# Patient Record
Sex: FEMALE | Race: ASIAN | Employment: OTHER | URBAN - METROPOLITAN AREA
[De-identification: names, ages, dates, MRNs, and addresses within clinical notes are randomized per-mention and may not be internally consistent; named-entity substitution may affect disease eponyms.]

---

## 2017-01-12 ENCOUNTER — OFFICE VISIT (OUTPATIENT)
Dept: INTERNAL MEDICINE CLINIC | Facility: CLINIC | Age: 67
End: 2017-01-12

## 2017-01-12 VITALS
OXYGEN SATURATION: 96 % | HEART RATE: 72 BPM | TEMPERATURE: 99 F | DIASTOLIC BLOOD PRESSURE: 52 MMHG | SYSTOLIC BLOOD PRESSURE: 114 MMHG | HEIGHT: 62 IN | WEIGHT: 179 LBS | BODY MASS INDEX: 32.94 KG/M2

## 2017-01-12 DIAGNOSIS — J06.9 URI, ACUTE: Primary | ICD-10-CM

## 2017-01-12 DIAGNOSIS — I10 ESSENTIAL HYPERTENSION: ICD-10-CM

## 2017-01-12 DIAGNOSIS — H26.8 OTHER CATARACT: ICD-10-CM

## 2017-01-12 PROCEDURE — 99213 OFFICE O/P EST LOW 20 MIN: CPT | Performed by: INTERNAL MEDICINE

## 2017-01-12 RX ORDER — APIXABAN 2.5 MG/1
1 TABLET, FILM COATED ORAL 2 TIMES DAILY
Refills: 1 | COMMUNITY
Start: 2016-12-06 | End: 2017-10-13 | Stop reason: ALTCHOICE

## 2017-01-12 RX ORDER — LATANOPROST 50 UG/ML
1 SOLUTION/ DROPS OPHTHALMIC NIGHTLY
Qty: 2.5 ML | Refills: 4 | Status: SHIPPED | OUTPATIENT
Start: 2017-01-12 | End: 2017-11-29

## 2017-01-12 RX ORDER — CODEINE PHOSPHATE AND GUAIFENESIN 10; 100 MG/5ML; MG/5ML
10 SOLUTION ORAL EVERY 6 HOURS PRN
Qty: 180 ML | Refills: 0 | Status: SHIPPED | OUTPATIENT
Start: 2017-01-12 | End: 2017-01-22

## 2017-01-12 RX ORDER — FUROSEMIDE 20 MG/1
40 TABLET ORAL DAILY
Qty: 90 TABLET | Refills: 1 | Status: SHIPPED | OUTPATIENT
Start: 2017-01-12 | End: 2019-05-30

## 2017-01-12 RX ORDER — FAMOTIDINE 20 MG/1
1 TABLET ORAL 2 TIMES DAILY
Refills: 2 | COMMUNITY
Start: 2016-12-24 | End: 2018-01-12

## 2017-01-12 RX ORDER — AZITHROMYCIN 250 MG/1
TABLET, FILM COATED ORAL
Qty: 6 TABLET | Refills: 0 | Status: SHIPPED | OUTPATIENT
Start: 2017-01-12 | End: 2017-10-13 | Stop reason: ALTCHOICE

## 2017-01-12 RX ORDER — CARVEDILOL 6.25 MG/1
6.25 TABLET ORAL 2 TIMES DAILY WITH MEALS
Qty: 180 TABLET | Refills: 1 | Status: SHIPPED | OUTPATIENT
Start: 2017-01-12 | End: 2017-06-19

## 2017-01-12 NOTE — PROGRESS NOTES
Mary Todd is a 78year old female. Patient presents with:  Cough: w/ phlegm, body aches, headache, and chest congestion x 2 days.        HPI:     Patient c/o cough, congestion, b/l ear pain, headache for the last 4 days, worse in the last 2 day sulfate 325 (65 FE) MG Oral Tab EC Take 325 mg by mouth 2 (two) times daily with meals. Disp:  Rfl:    levetiracetam 750 MG Oral Tab Take 1 tablet by mouth 2 (two) times daily.  Disp:  Rfl:       Past Medical History   Diagnosis Date   • Pure hypercholest Take 2 tablets (40 mg total) by mouth daily. carvedilol 6.25 MG Oral Tab 180 tablet 1      Sig: Take 1 tablet (6.25 mg total) by mouth 2 (two) times daily with meals.       guaiFENesin-codeine (CHERATUSSIN AC) 100-10 MG/5ML Oral Solution 180 mL 0

## 2017-01-19 ENCOUNTER — OFFICE VISIT (OUTPATIENT)
Dept: INTERNAL MEDICINE CLINIC | Facility: CLINIC | Age: 67
End: 2017-01-19

## 2017-01-19 VITALS
RESPIRATION RATE: 16 BRPM | DIASTOLIC BLOOD PRESSURE: 70 MMHG | SYSTOLIC BLOOD PRESSURE: 124 MMHG | OXYGEN SATURATION: 98 % | HEART RATE: 64 BPM | HEIGHT: 62 IN | TEMPERATURE: 98 F | WEIGHT: 178 LBS | BODY MASS INDEX: 32.76 KG/M2

## 2017-01-19 DIAGNOSIS — R06.2 WHEEZING: ICD-10-CM

## 2017-01-19 DIAGNOSIS — R05.3 PERSISTENT COUGH: Primary | ICD-10-CM

## 2017-01-19 PROCEDURE — 99213 OFFICE O/P EST LOW 20 MIN: CPT | Performed by: INTERNAL MEDICINE

## 2017-01-19 RX ORDER — PREDNISONE 10 MG/1
10 TABLET ORAL DAILY
Qty: 18 TABLET | Refills: 0 | Status: SHIPPED | OUTPATIENT
Start: 2017-01-19 | End: 2018-01-12

## 2017-01-19 RX ORDER — ALBUTEROL SULFATE 90 UG/1
2 AEROSOL, METERED RESPIRATORY (INHALATION) EVERY 4 HOURS PRN
Qty: 1 INHALER | Refills: 1 | Status: SHIPPED | OUTPATIENT
Start: 2017-01-19 | End: 2018-01-12

## 2017-01-19 RX ORDER — CODEINE PHOSPHATE AND GUAIFENESIN 10; 100 MG/5ML; MG/5ML
10 SOLUTION ORAL NIGHTLY PRN
Qty: 236 ML | Refills: 0 | Status: SHIPPED | OUTPATIENT
Start: 2017-01-19 | End: 2017-01-29

## 2017-01-19 NOTE — PROGRESS NOTES
Stephen Husbands is a 78year old female. Patient presents with: Follow - Up: still has congestion and cough.  Also still has Phlegm, some possible wheezing       HPI:     Patient c/o persistent cough, chest wall pain from cough and now upper abdomin Rfl: 1   guaiFENesin-codeine (CHERATUSSIN AC) 100-10 MG/5ML Oral Solution Take 10 mL by mouth every 6 (six) hours as needed for cough.  Disp: 180 mL Rfl: 0   azithromycin (ZITHROMAX Z-AUSTEN) 250 MG Oral Tab 2 tabs po the first day and then one daily until fin taper, albuterol prn. Refilled Cheratussin AC.  If not improving, check cxr  Wheezing- as above      Meds & Refills for this Visit:  Signed Prescriptions Disp Refills    predniSONE 10 MG Oral Tab 18 tablet 0      Sig: Take 1 tablet (10 mg total) by mouth da

## 2017-05-17 ENCOUNTER — MED REC SCAN ONLY (OUTPATIENT)
Dept: INTERNAL MEDICINE CLINIC | Facility: CLINIC | Age: 67
End: 2017-05-17

## 2017-06-19 RX ORDER — CARVEDILOL 6.25 MG/1
TABLET ORAL
Qty: 180 TABLET | Refills: 0 | Status: SHIPPED | OUTPATIENT
Start: 2017-06-19 | End: 2017-09-17

## 2017-07-26 ENCOUNTER — PATIENT MESSAGE (OUTPATIENT)
Dept: INTERNAL MEDICINE CLINIC | Facility: CLINIC | Age: 67
End: 2017-07-26

## 2017-07-26 NOTE — TELEPHONE ENCOUNTER
From: Katherin Howard  To: Estrella Rodríguez MD  Sent: 7/26/2017 2:07 PM CDT  Subject: Non-Urgent Miriam Rios,  My name is Glenn. My question is : when is the next time I need to take TDAP.  If my memory is correct I took one i

## 2017-07-26 NOTE — TELEPHONE ENCOUNTER
LOV 1/19/17 TB    Last Tdap 10/4/12  Last CPE 10/4/16  Last labs 10/21/16  Pt is not due for f/u, immunizations, CPE or labs at this time.  Please advise/comfirm

## 2017-08-02 RX ORDER — LEVETIRACETAM 750 MG/1
750 TABLET ORAL 2 TIMES DAILY
Qty: 180 TABLET | Refills: 3 | Status: SHIPPED | OUTPATIENT
Start: 2017-08-02 | End: 2017-10-13

## 2017-08-02 NOTE — TELEPHONE ENCOUNTER
LOV : 1.19.17   Upcoming visit 10.12.17   Last labs: 10.21.16 bmp   Last refill : 5/17/16   Per protocol  Route to provider

## 2017-08-16 DIAGNOSIS — E78.00 PURE HYPERCHOLESTEROLEMIA: ICD-10-CM

## 2017-08-16 RX ORDER — ATORVASTATIN CALCIUM 10 MG/1
TABLET, FILM COATED ORAL
Qty: 90 TABLET | Refills: 0 | Status: SHIPPED | OUTPATIENT
Start: 2017-08-16 | End: 2017-11-13

## 2017-09-18 RX ORDER — CARVEDILOL 6.25 MG/1
TABLET ORAL
Qty: 180 TABLET | Refills: 0 | Status: SHIPPED | OUTPATIENT
Start: 2017-09-18 | End: 2017-12-15

## 2017-10-11 ENCOUNTER — TELEPHONE (OUTPATIENT)
Dept: INTERNAL MEDICINE CLINIC | Facility: CLINIC | Age: 67
End: 2017-10-11

## 2017-10-11 DIAGNOSIS — E78.00 PURE HYPERCHOLESTEROLEMIA: ICD-10-CM

## 2017-10-11 DIAGNOSIS — I10 ESSENTIAL HYPERTENSION: Primary | ICD-10-CM

## 2017-10-11 DIAGNOSIS — G40.909 SEIZURE DISORDER (HCC): ICD-10-CM

## 2017-10-11 NOTE — TELEPHONE ENCOUNTER
Last labs TSH , cbc, cmp, lipid on 9/16/16  LOV 1/19/17  Orders pended for approval - please advise what/if any additional labs needed.

## 2017-10-13 ENCOUNTER — LAB ENCOUNTER (OUTPATIENT)
Dept: LAB | Age: 67
End: 2017-10-13
Attending: INTERNAL MEDICINE
Payer: MEDICARE

## 2017-10-13 ENCOUNTER — OFFICE VISIT (OUTPATIENT)
Dept: INTERNAL MEDICINE CLINIC | Facility: CLINIC | Age: 67
End: 2017-10-13

## 2017-10-13 VITALS
HEIGHT: 62 IN | TEMPERATURE: 98 F | WEIGHT: 178.5 LBS | RESPIRATION RATE: 16 BRPM | OXYGEN SATURATION: 98 % | DIASTOLIC BLOOD PRESSURE: 74 MMHG | BODY MASS INDEX: 32.85 KG/M2 | HEART RATE: 59 BPM | SYSTOLIC BLOOD PRESSURE: 130 MMHG

## 2017-10-13 DIAGNOSIS — Z23 NEED FOR INFLUENZA VACCINATION: ICD-10-CM

## 2017-10-13 DIAGNOSIS — I10 ESSENTIAL HYPERTENSION: ICD-10-CM

## 2017-10-13 DIAGNOSIS — E78.00 PURE HYPERCHOLESTEROLEMIA: ICD-10-CM

## 2017-10-13 DIAGNOSIS — Z98.890 STATUS POST RESECTION OF MENINGIOMA: ICD-10-CM

## 2017-10-13 DIAGNOSIS — G40.909 SEIZURE DISORDER (HCC): ICD-10-CM

## 2017-10-13 DIAGNOSIS — Z86.018 STATUS POST RESECTION OF MENINGIOMA: ICD-10-CM

## 2017-10-13 DIAGNOSIS — Z00.00 WELLNESS EXAMINATION: Primary | ICD-10-CM

## 2017-10-13 PROCEDURE — 80061 LIPID PANEL: CPT

## 2017-10-13 PROCEDURE — 80053 COMPREHEN METABOLIC PANEL: CPT

## 2017-10-13 PROCEDURE — 85025 COMPLETE CBC W/AUTO DIFF WBC: CPT

## 2017-10-13 PROCEDURE — 90653 IIV ADJUVANT VACCINE IM: CPT | Performed by: INTERNAL MEDICINE

## 2017-10-13 PROCEDURE — G0439 PPPS, SUBSEQ VISIT: HCPCS | Performed by: INTERNAL MEDICINE

## 2017-10-13 PROCEDURE — 84443 ASSAY THYROID STIM HORMONE: CPT

## 2017-10-13 PROCEDURE — G0008 ADMIN INFLUENZA VIRUS VAC: HCPCS | Performed by: INTERNAL MEDICINE

## 2017-11-13 DIAGNOSIS — E78.00 PURE HYPERCHOLESTEROLEMIA: ICD-10-CM

## 2017-11-13 RX ORDER — ATORVASTATIN CALCIUM 10 MG/1
TABLET, FILM COATED ORAL
Qty: 90 TABLET | Refills: 1 | Status: SHIPPED | OUTPATIENT
Start: 2017-11-13 | End: 2018-05-10

## 2017-11-29 RX ORDER — LATANOPROST 50 UG/ML
SOLUTION/ DROPS OPHTHALMIC
Qty: 2.5 ML | Refills: 0 | Status: SHIPPED | OUTPATIENT
Start: 2017-11-29 | End: 2017-12-19

## 2017-12-18 RX ORDER — CARVEDILOL 6.25 MG/1
6.25 TABLET ORAL 2 TIMES DAILY WITH MEALS
Qty: 180 TABLET | Refills: 1 | Status: SHIPPED | OUTPATIENT
Start: 2017-12-18 | End: 2018-06-13

## 2017-12-19 RX ORDER — LATANOPROST 50 UG/ML
SOLUTION/ DROPS OPHTHALMIC
Qty: 2.5 ML | Refills: 0 | Status: SHIPPED | OUTPATIENT
Start: 2017-12-19 | End: 2018-01-07

## 2017-12-19 NOTE — TELEPHONE ENCOUNTER
Lov: 10/13/2017   Upcoming Appt: None   Last Labs: 10/13/2017 Lipid, CMP, CBC, Tsh   Last Refill: 11/29/2017 . 0 refills       Per protocol route to provider

## 2018-01-08 RX ORDER — LATANOPROST 50 UG/ML
SOLUTION/ DROPS OPHTHALMIC
Qty: 2.5 ML | Refills: 0 | Status: SHIPPED | OUTPATIENT
Start: 2018-01-08 | End: 2018-01-31

## 2018-01-09 NOTE — TELEPHONE ENCOUNTER
LOV: 10/13/17  Future office visit: No upcoming visit  Last labs: 10/13/17 Lipid Cmp Cbc Tsh   Last RX: 12/19/17 #2.5mL No Refills  Per protocol: Route to provider

## 2018-01-12 ENCOUNTER — OFFICE VISIT (OUTPATIENT)
Dept: INTERNAL MEDICINE CLINIC | Facility: CLINIC | Age: 68
End: 2018-01-12

## 2018-01-12 VITALS
DIASTOLIC BLOOD PRESSURE: 78 MMHG | HEART RATE: 60 BPM | HEIGHT: 62 IN | RESPIRATION RATE: 16 BRPM | BODY MASS INDEX: 33.53 KG/M2 | SYSTOLIC BLOOD PRESSURE: 130 MMHG | TEMPERATURE: 98 F | WEIGHT: 182.19 LBS

## 2018-01-12 DIAGNOSIS — J06.9 URI, ACUTE: Primary | ICD-10-CM

## 2018-01-12 PROCEDURE — 99213 OFFICE O/P EST LOW 20 MIN: CPT | Performed by: INTERNAL MEDICINE

## 2018-01-12 RX ORDER — AZITHROMYCIN 250 MG/1
TABLET, FILM COATED ORAL
Qty: 6 TABLET | Refills: 0 | Status: SHIPPED | OUTPATIENT
Start: 2018-01-12 | End: 2018-04-09 | Stop reason: ALTCHOICE

## 2018-01-12 NOTE — PROGRESS NOTES
Radha Castillo is a 75year old female. Patient presents with:  Sore Throat: Room 3. Cough, headache, nasal drip. Started yesterday       HPI:     Patient c/o sore throat, cough and congestion since yesterday. No fevers or myalgias.  Had flu vaccine Breast Cancer Mother 46   • brain cancer [OTHER] Mother         Allergies    Benadryl [Diphenhyd*        Comment:Rapid heart rate  Heparin                     Comment:Blood clot  Penicillins             Hives      REVIEW OF SYSTEMS:   GENERAL HEALTH:  no f

## 2018-01-31 RX ORDER — LATANOPROST 50 UG/ML
SOLUTION/ DROPS OPHTHALMIC
Qty: 2.5 ML | Refills: 0 | Status: SHIPPED | OUTPATIENT
Start: 2018-01-31 | End: 2018-03-15

## 2018-01-31 NOTE — TELEPHONE ENCOUNTER
LOV: 1/12/18  Future office visit: No upcoming visit   Last labs: 10/13/17 Lipid Cmp Cbc Tsh  Last RX: 1/8/18 2/5 mL no Refills  Per protocol: Route to provider

## 2018-03-15 RX ORDER — LATANOPROST 50 UG/ML
SOLUTION/ DROPS OPHTHALMIC
Qty: 7.5 ML | Refills: 0 | Status: SHIPPED | OUTPATIENT
Start: 2018-03-15 | End: 2018-05-29

## 2018-03-15 NOTE — TELEPHONE ENCOUNTER
LOV: 1/12/18 w/ TB  FOV: 4/9/18  Last labs: 10/13/17 LIPID,CMP,CBC,TSH  Last Refill: 1/31/18 qt:2.5 mL    Per protocol sent to provider

## 2018-04-09 ENCOUNTER — OFFICE VISIT (OUTPATIENT)
Dept: INTERNAL MEDICINE CLINIC | Facility: CLINIC | Age: 68
End: 2018-04-09

## 2018-04-09 VITALS
HEIGHT: 62 IN | WEIGHT: 182 LBS | DIASTOLIC BLOOD PRESSURE: 64 MMHG | RESPIRATION RATE: 16 BRPM | BODY MASS INDEX: 33.49 KG/M2 | HEART RATE: 72 BPM | SYSTOLIC BLOOD PRESSURE: 118 MMHG | TEMPERATURE: 98 F

## 2018-04-09 DIAGNOSIS — Z01.818 PRE-OP EXAMINATION: Primary | ICD-10-CM

## 2018-04-09 DIAGNOSIS — F41.8 SITUATIONAL ANXIETY: ICD-10-CM

## 2018-04-09 DIAGNOSIS — H26.8 OTHER CATARACT OF LEFT EYE: ICD-10-CM

## 2018-04-09 DIAGNOSIS — M54.31 RIGHT SIDED SCIATICA: ICD-10-CM

## 2018-04-09 PROCEDURE — 99214 OFFICE O/P EST MOD 30 MIN: CPT | Performed by: INTERNAL MEDICINE

## 2018-04-09 RX ORDER — PREDNISOLONE ACETATE 10 MG/ML
SUSPENSION/ DROPS OPHTHALMIC
COMMUNITY
Start: 2018-03-29 | End: 2018-08-10

## 2018-04-09 RX ORDER — OFLOXACIN 3 MG/ML
SOLUTION/ DROPS OPHTHALMIC
COMMUNITY
Start: 2018-03-29 | End: 2018-08-10

## 2018-04-09 RX ORDER — METHYLPREDNISOLONE 4 MG/1
TABLET ORAL
Qty: 1 KIT | Refills: 0 | Status: SHIPPED | OUTPATIENT
Start: 2018-04-09 | End: 2018-08-10

## 2018-04-09 RX ORDER — KETOROLAC TROMETHAMINE 5 MG/ML
SOLUTION OPHTHALMIC
COMMUNITY
Start: 2018-03-29 | End: 2020-11-13

## 2018-04-09 RX ORDER — ALPRAZOLAM 0.5 MG/1
0.5 TABLET ORAL DAILY PRN
Qty: 10 TABLET | Refills: 0 | Status: SHIPPED | OUTPATIENT
Start: 2018-04-09 | End: 2018-08-10

## 2018-04-09 NOTE — PROGRESS NOTES
Cassidy Crain is a 70year old female.   Patient presents with:  Pre-Op Exam: AB RM 3, Cataract surgery on 4/18/2018 with Dr. Lynnette Aragon  Back Pain: Pt states having right back pain that travels down the right leg X 2 weeks       HPI:     Patient with Ophthalmic Solution INSTILL 1 DROP IN BOTH EYES EVERY NIGHT Disp: 7.5 mL Rfl: 0   carvedilol 6.25 MG Oral Tab Take 1 tablet (6.25 mg total) by mouth 2 (two) times daily with meals.  Disp: 180 tablet Rfl: 1   ATORVASTATIN 10 MG Oral Tab TAKE 1 TABLET(10 MG) cyanosis, clubbing or edema, normal strength and tone  Spine: lumbar spine tenderness, +radiation to right buttock  NEURO: Alert and oriented, no focal deficits    ASSESSMENT AND PLAN:   Pre-op examination - Patient medically cleared for cataract surgery w

## 2018-04-16 ENCOUNTER — TELEPHONE (OUTPATIENT)
Dept: INTERNAL MEDICINE CLINIC | Facility: CLINIC | Age: 68
End: 2018-04-16

## 2018-05-08 ENCOUNTER — MED REC SCAN ONLY (OUTPATIENT)
Dept: INTERNAL MEDICINE CLINIC | Facility: CLINIC | Age: 68
End: 2018-05-08

## 2018-05-10 DIAGNOSIS — E78.00 PURE HYPERCHOLESTEROLEMIA: ICD-10-CM

## 2018-05-10 RX ORDER — ATORVASTATIN CALCIUM 10 MG/1
TABLET, FILM COATED ORAL
Qty: 90 TABLET | Refills: 0 | Status: SHIPPED | OUTPATIENT
Start: 2018-05-10 | End: 2018-08-08

## 2018-05-29 RX ORDER — LATANOPROST 50 UG/ML
SOLUTION/ DROPS OPHTHALMIC
Qty: 7.5 ML | Refills: 0 | Status: SHIPPED | OUTPATIENT
Start: 2018-05-29 | End: 2018-08-15

## 2018-05-29 NOTE — TELEPHONE ENCOUNTER
Last Office Visit: 4-9-18 with TB for pre op   Last Rx Filled: 3-15-18 7.5ml with no refills  Last Labs: 10-13-17 lipid/cmp/cbc/tsh  Future Appointment: none    Per protocol to provider

## 2018-06-13 RX ORDER — CARVEDILOL 6.25 MG/1
TABLET ORAL
Qty: 180 TABLET | Refills: 3 | Status: SHIPPED | OUTPATIENT
Start: 2018-06-13 | End: 2019-06-10

## 2018-06-13 NOTE — TELEPHONE ENCOUNTER
Protocol passed however LOV pt was due to return at the end of May, I do not see apt scheduled protocol to provider

## 2018-07-20 ENCOUNTER — MED REC SCAN ONLY (OUTPATIENT)
Dept: INTERNAL MEDICINE CLINIC | Facility: CLINIC | Age: 68
End: 2018-07-20

## 2018-08-08 DIAGNOSIS — E78.00 PURE HYPERCHOLESTEROLEMIA: ICD-10-CM

## 2018-08-08 RX ORDER — ATORVASTATIN CALCIUM 10 MG/1
TABLET, FILM COATED ORAL
Qty: 90 TABLET | Refills: 0 | Status: SHIPPED | OUTPATIENT
Start: 2018-08-08 | End: 2018-11-06

## 2018-08-10 ENCOUNTER — OFFICE VISIT (OUTPATIENT)
Dept: INTERNAL MEDICINE CLINIC | Facility: CLINIC | Age: 68
End: 2018-08-10
Payer: MEDICARE

## 2018-08-10 VITALS
TEMPERATURE: 98 F | BODY MASS INDEX: 33.75 KG/M2 | DIASTOLIC BLOOD PRESSURE: 62 MMHG | RESPIRATION RATE: 16 BRPM | HEIGHT: 61.75 IN | HEART RATE: 64 BPM | WEIGHT: 183.38 LBS | SYSTOLIC BLOOD PRESSURE: 132 MMHG

## 2018-08-10 DIAGNOSIS — R25.2 LEG CRAMPS: Primary | ICD-10-CM

## 2018-08-10 DIAGNOSIS — E78.00 PURE HYPERCHOLESTEROLEMIA: ICD-10-CM

## 2018-08-10 DIAGNOSIS — M48.061 SPINAL STENOSIS OF LUMBAR REGION, UNSPECIFIED WHETHER NEUROGENIC CLAUDICATION PRESENT: ICD-10-CM

## 2018-08-10 PROCEDURE — 99214 OFFICE O/P EST MOD 30 MIN: CPT | Performed by: INTERNAL MEDICINE

## 2018-08-10 RX ORDER — TURMERIC 100 %
POWDER (GRAM) MISCELLANEOUS
COMMUNITY
End: 2021-12-16 | Stop reason: ALTCHOICE

## 2018-08-10 RX ORDER — TROLAMINE SALICYLATE 10 G/100G
CREAM TOPICAL
COMMUNITY
End: 2020-11-13

## 2018-08-10 RX ORDER — CYCLOBENZAPRINE HCL 10 MG
10 TABLET ORAL NIGHTLY PRN
Qty: 30 TABLET | Refills: 0 | Status: SHIPPED | OUTPATIENT
Start: 2018-08-10 | End: 2018-11-01 | Stop reason: ALTCHOICE

## 2018-08-10 RX ORDER — LEVETIRACETAM 750 MG/1
750 TABLET ORAL
COMMUNITY
End: 2018-11-01

## 2018-08-10 NOTE — PROGRESS NOTES
Patti Martinez is a 70year old female. Patient presents with:  Pain: cn room 3: muscle cramping at night . cardiologist requesting bloodwork if any blood work needs to be ordered would like to have them done together.    Cholesterol      HPI:     Pa Rfl:    Timolol Maleate 0.5 % Ophthalmic Solution INT 1 GTT IN EACH EYE QAM Disp:  Rfl: 5   furosemide 20 MG Oral Tab Take 2 tablets (40 mg total) by mouth daily.  Disp: 90 tablet Rfl: 1   VENTOLIN  (90 Base) MCG/ACT Inhalation Aero Soln INHALE 2 PUF normocephalic  LUNGS: normal rate without respiratory distress, lungs clear to auscultation  CARDIO: RRR nl S1 S2  GI: normal bowel sounds, soft, NT/ND  EXTREMITIES: no cyanosis, clubbing or edema.  Pulses wnl  NEURO: Alert and oriented    ASSESSMENT AND PL

## 2018-08-15 RX ORDER — LATANOPROST 50 UG/ML
SOLUTION/ DROPS OPHTHALMIC
Qty: 7.5 ML | Refills: 0 | Status: SHIPPED | OUTPATIENT
Start: 2018-08-15 | End: 2019-01-27

## 2018-08-15 NOTE — TELEPHONE ENCOUNTER
LOV: 05/29/2018  Future Visit: No appointment scheduled   Last Labs: 10/13/2017 Lipid panel, COMP, CBC, TSH, Basic metabolic panel   Last Rx: 05/29/2018    Per protocol sent to provider

## 2018-08-20 ENCOUNTER — APPOINTMENT (OUTPATIENT)
Dept: LAB | Age: 68
End: 2018-08-20
Attending: INTERNAL MEDICINE
Payer: MEDICARE

## 2018-08-20 DIAGNOSIS — R25.2 LEG CRAMPS: ICD-10-CM

## 2018-08-20 DIAGNOSIS — E78.00 PURE HYPERCHOLESTEROLEMIA: ICD-10-CM

## 2018-08-20 LAB
ALBUMIN SERPL-MCNC: 3.6 G/DL (ref 3.5–4.8)
ALBUMIN/GLOB SERPL: 1.2 {RATIO} (ref 1–2)
ALP LIVER SERPL-CCNC: 82 U/L (ref 55–142)
ALT SERPL-CCNC: 30 U/L (ref 14–54)
ANION GAP SERPL CALC-SCNC: 8 MMOL/L (ref 0–18)
AST SERPL-CCNC: 17 U/L (ref 15–41)
BILIRUB SERPL-MCNC: 0.4 MG/DL (ref 0.1–2)
BUN BLD-MCNC: 15 MG/DL (ref 8–20)
BUN/CREAT SERPL: 17.2 (ref 10–20)
CALCIUM BLD-MCNC: 9.5 MG/DL (ref 8.3–10.3)
CHLORIDE SERPL-SCNC: 110 MMOL/L (ref 101–111)
CHOLEST SMN-MCNC: 190 MG/DL (ref ?–200)
CK SERPL-CCNC: 106 IU/L (ref 26–192)
CO2 SERPL-SCNC: 26 MMOL/L (ref 22–32)
CREAT BLD-MCNC: 0.87 MG/DL (ref 0.55–1.02)
GLOBULIN PLAS-MCNC: 2.9 G/DL (ref 2.5–4)
GLUCOSE BLD-MCNC: 112 MG/DL (ref 70–99)
HAV IGM SER QL: 2.2 MG/DL (ref 1.8–2.5)
HDLC SERPL-MCNC: 64 MG/DL (ref 40–59)
LDLC SERPL CALC-MCNC: 80 MG/DL (ref ?–100)
M PROTEIN MFR SERPL ELPH: 6.5 G/DL (ref 6.1–8.3)
NONHDLC SERPL-MCNC: 126 MG/DL (ref ?–130)
OSMOLALITY SERPL CALC.SUM OF ELEC: 300 MOSM/KG (ref 275–295)
POTASSIUM SERPL-SCNC: 4.6 MMOL/L (ref 3.6–5.1)
SODIUM SERPL-SCNC: 144 MMOL/L (ref 136–144)
TRIGL SERPL-MCNC: 228 MG/DL (ref 30–149)
VLDLC SERPL CALC-MCNC: 46 MG/DL (ref 0–30)

## 2018-08-20 PROCEDURE — 82550 ASSAY OF CK (CPK): CPT

## 2018-08-20 PROCEDURE — 83735 ASSAY OF MAGNESIUM: CPT

## 2018-08-20 PROCEDURE — 80053 COMPREHEN METABOLIC PANEL: CPT

## 2018-08-20 PROCEDURE — 80061 LIPID PANEL: CPT

## 2018-08-29 ENCOUNTER — MED REC SCAN ONLY (OUTPATIENT)
Dept: INTERNAL MEDICINE CLINIC | Facility: CLINIC | Age: 68
End: 2018-08-29

## 2018-08-30 ENCOUNTER — OFFICE VISIT (OUTPATIENT)
Dept: PHYSICAL THERAPY | Age: 68
End: 2018-08-30
Attending: INTERNAL MEDICINE
Payer: MEDICARE

## 2018-08-30 DIAGNOSIS — M48.061 SPINAL STENOSIS OF LUMBAR REGION, UNSPECIFIED WHETHER NEUROGENIC CLAUDICATION PRESENT: ICD-10-CM

## 2018-08-30 PROCEDURE — 97163 PT EVAL HIGH COMPLEX 45 MIN: CPT

## 2018-08-30 PROCEDURE — 97110 THERAPEUTIC EXERCISES: CPT

## 2018-08-30 NOTE — PROGRESS NOTES
SPINE EVALUATION:   Referring Physician: Dr. Geraldo Harris  Diagnosis: Spinal stenosis    Date of Service: 8/30/2018     PATIENT SUMMARY   Mindy Moscoso is a 70year old female who presents to therapy today with complaints of central LBP, resolving B Observation/Posture: no guarding with transfers, fluid motions. Increased lumbar lordosis with excessive anterior pelvic tilt.  No lumbar shift noted, slight curve R.   Gait: decreased heel/toe bilaterally with mild lateral sway  Neuro Screen: Absent B S1 on spinal segments.  -Improve abdominal strength to WFL's as noted with biofeedback pressure cuff so pt can reduce her excessive lumbar lordosis and compression to spinal segments so she can stand for 10-15 minutes to prepare meals, perform self care ADL's

## 2018-09-06 ENCOUNTER — OFFICE VISIT (OUTPATIENT)
Dept: PHYSICAL THERAPY | Age: 68
End: 2018-09-06
Attending: INTERNAL MEDICINE
Payer: MEDICARE

## 2018-09-06 PROCEDURE — 97112 NEUROMUSCULAR REEDUCATION: CPT

## 2018-09-06 PROCEDURE — 97110 THERAPEUTIC EXERCISES: CPT

## 2018-09-06 PROCEDURE — 97140 MANUAL THERAPY 1/> REGIONS: CPT

## 2018-09-06 NOTE — PROGRESS NOTES
Dx: Spinal stenosis        Authorized # of Visits:  medicare        Subjective: Reports she is performing her HEP flexion stretch and using herbal creams on LE's for the last week and has noticed decreased lower leg pain at night and in the morning.      Ob minutes to prepare meals, perform self care ADL's.   -Reduce lower leg pain from 10/10 at worse to 3-4/10 so pt can resume ambulating in the community or at her fitness for regular exercise.  -Independent in progressive HEP.     Plan: check LE symptoms, HEP

## 2018-09-10 ENCOUNTER — OFFICE VISIT (OUTPATIENT)
Dept: PHYSICAL THERAPY | Age: 68
End: 2018-09-10
Attending: INTERNAL MEDICINE
Payer: MEDICARE

## 2018-09-10 PROCEDURE — 97112 NEUROMUSCULAR REEDUCATION: CPT

## 2018-09-10 PROCEDURE — 97110 THERAPEUTIC EXERCISES: CPT

## 2018-09-10 PROCEDURE — 97140 MANUAL THERAPY 1/> REGIONS: CPT

## 2018-09-10 NOTE — PROGRESS NOTES
Dx: Spinal stenosis        Authorized # of Visits:  medicare        Subjective: Had busy weekend. No HEP. Had back and R ankle pain, Advil for pain relief. Has those symptoms now.       Objective:       Date: 9/6/2018 TX#: 2/ Date:   9/10            TX#: interruption from leg pain or back pain.    -Improve lumbar spine flexibility at surrounding soft tissue so pt can perform full lumbar flexion, decrease compression on spinal segments.  -Improve abdominal strength to WFL's as noted with biofeedback pressure

## 2018-09-13 ENCOUNTER — OFFICE VISIT (OUTPATIENT)
Dept: PHYSICAL THERAPY | Age: 68
End: 2018-09-13
Attending: INTERNAL MEDICINE
Payer: MEDICARE

## 2018-09-13 PROCEDURE — 97112 NEUROMUSCULAR REEDUCATION: CPT

## 2018-09-13 PROCEDURE — 97110 THERAPEUTIC EXERCISES: CPT

## 2018-09-13 NOTE — PROGRESS NOTES
Dx: Spinal stenosis        Authorized # of Visits:  medicare        Subjective: Reports good improvement with leg pain and back pain versus 1 week ago. Little to no symptoms L lower leg. R lower leg now mostly lateral area, new EP neural mobe resolves. NM re ed:  [facility] lying: ab draw in maneuver, -use biofeedback pressure cuff  -baseline 20 mm/hg  20 min                                     Assessment: Pt's calf complaints from gastroc/soleus soft tissue. Stretches resolved complaints.   Pt reporting

## 2018-09-20 ENCOUNTER — OFFICE VISIT (OUTPATIENT)
Dept: PHYSICAL THERAPY | Age: 68
End: 2018-09-20
Attending: INTERNAL MEDICINE
Payer: MEDICARE

## 2018-09-20 PROCEDURE — 97530 THERAPEUTIC ACTIVITIES: CPT

## 2018-09-20 PROCEDURE — 97112 NEUROMUSCULAR REEDUCATION: CPT

## 2018-09-20 NOTE — PROGRESS NOTES
Dx: Spinal stenosis        Authorized # of Visits:  medicare        Subjective: Reports lower legs continue to improve. Mostly R foot sensation changes now, dorsal more than planter. Main priority now is her chronic back pain.   Worse with sit to stand tr cuff  -baseline 24 mm/hg  -progress to 10 second holds    -progress to bent knee fallouts  -bent knee marches  (add march to HEP)  30 min total  20 min NM re ed:   Instruct/perform  Segmental trunk flexion/ext from chair  -add tissue mobilization to paraspi minutes to prepare meals, perform self care ADL's. In progress  -Reduce lower leg pain from 10/10 at worse to 3-4/10 so pt can resume ambulating in the community or at her fitness for regular exercise. In progress  -Independent in progressive HEP.     Plan:

## 2018-09-24 ENCOUNTER — OFFICE VISIT (OUTPATIENT)
Dept: PHYSICAL THERAPY | Age: 68
End: 2018-09-24
Attending: INTERNAL MEDICINE
Payer: MEDICARE

## 2018-09-24 PROCEDURE — 97530 THERAPEUTIC ACTIVITIES: CPT

## 2018-09-24 PROCEDURE — 97110 THERAPEUTIC EXERCISES: CPT

## 2018-09-24 PROCEDURE — 97140 MANUAL THERAPY 1/> REGIONS: CPT

## 2018-09-24 PROCEDURE — 97112 NEUROMUSCULAR REEDUCATION: CPT

## 2018-09-24 NOTE — PROGRESS NOTES
Dx: Spinal stenosis        Authorized # of Visits:  medicare        Subjective: Reports lower legs continue to improve. Mostly R foot sensation changes now, dorsal more than planter.   Main priority now is her chronic back pain which she reports has improv sec x 3 Man PT:  L and R sidelying:  Grade 3,4 lumbar spine gapping and distraction mobes all lumbar segments  15 min     NM re ed:  Hook lying: ab draw in maneuver, unable to contract  -use biofeedback pressure cuff  -baseline 20 mm/hg Man PT:  L and R si stability, decrease shearing at lumbar spine. Goals in progress.  Pt becoming consistent with full lumbar flexion w/o symptoms (goal 2)    Goals:   Improve lumbar spine segment mobility to WFL's so pt can lay supine, sleep 4 of 7 nights week w/o interruptio

## 2018-09-27 ENCOUNTER — OFFICE VISIT (OUTPATIENT)
Dept: PHYSICAL THERAPY | Age: 68
End: 2018-09-27
Attending: INTERNAL MEDICINE
Payer: MEDICARE

## 2018-09-27 PROCEDURE — 97112 NEUROMUSCULAR REEDUCATION: CPT

## 2018-09-27 PROCEDURE — 97140 MANUAL THERAPY 1/> REGIONS: CPT

## 2018-09-27 PROCEDURE — 97110 THERAPEUTIC EXERCISES: CPT

## 2018-09-27 NOTE — PROGRESS NOTES
Dx: Spinal stenosis        Authorized # of Visits:  medicare        Subjective: Reports lower legs continue to improve (no noticeable symptoms for several days). .  Main priority now is her chronic back pain which she reports has improved with current HEP. 15 sec x 3 There ex:  4 pt kneel into heel sit trunk flexion  Add tissue mobilization to paraspinals x 6 min  -add to hEP with 15 sec holds 4 reps There ex:  Sit: trunk flexion, lumbar gapping, hold 15 sec x 3   As above with hands on 10 inch platform, sta fallouts  -bent knee marches  (add march to HEP)  30 min total NM re ed:  Hook lying: ab draw in maneuver, -use biofeedback pressure cuff  -baseline 24 mm/hg  -progress to 10 second holds at 40 mm/hg  - with bent knee marches  -progress HEP with resisted b regular exercise. Met  -Independent in progressive HEP. Plan: check HEP, goals,progress abdominal exercises.      Charges:   nm re ed 2 there ex 1 man 1      Total Timed Treatment: 54 min  Total Treatment Time: 54 min

## 2018-10-01 ENCOUNTER — OFFICE VISIT (OUTPATIENT)
Dept: PHYSICAL THERAPY | Age: 68
End: 2018-10-01
Attending: INTERNAL MEDICINE
Payer: MEDICARE

## 2018-10-01 PROCEDURE — 97110 THERAPEUTIC EXERCISES: CPT

## 2018-10-01 PROCEDURE — 97140 MANUAL THERAPY 1/> REGIONS: CPT

## 2018-10-01 PROCEDURE — 97112 NEUROMUSCULAR REEDUCATION: CPT

## 2018-10-01 NOTE — PROGRESS NOTES
Dx: Spinal stenosis        Authorized # of Visits:  medicare        Subjective: Reports lower legs continue to improve (no noticeable symptoms for several days). .  Main priority now is her chronic back pain (R>L) which she reports has improved with current lumbar spine gapping and distraction mobes all lumbar segments  15 min Man PT:  L and R sidelying:  Grade 3,4 lumbar spine gapping and distraction mobes all lumbar segments  15 min   Man PT:  L and R sidelying:  Grade 3,4 lumbar spine gapping and distracti lying: ab draw in maneuver, -use biofeedback pressure cuff  -baseline 35 mm/hg  -progress to 10 second holds at 44 mm/hg  - with resisted bent knee raises 5 sec holds  X 10 each  15 min total   NM re ed  Hook lying: ab draw in maneuver, -use biofeedback pr to improve trunk stability, decrease shearing at lumbar spine. Goals in progress. Goals:   Improve lumbar spine segment mobility to WFL's so pt can lay supine, sleep 4 of 7 nights week w/o interruption from leg pain or back pain.  Met  -Improve lumbar s

## 2018-10-08 ENCOUNTER — TELEPHONE (OUTPATIENT)
Dept: INTERNAL MEDICINE CLINIC | Facility: CLINIC | Age: 68
End: 2018-10-08

## 2018-10-08 DIAGNOSIS — Z12.31 ENCOUNTER FOR SCREENING MAMMOGRAM FOR MALIGNANT NEOPLASM OF BREAST: Primary | ICD-10-CM

## 2018-10-08 NOTE — TELEPHONE ENCOUNTER
Pt would like a mammo order placed. No call back necessary to pt she will call cs to schedule.        [facility]   Future Appointments   Date Time Provider Carter Huang      [facility] [place]      [facility] [place]      [facility] [place]

## 2018-10-15 ENCOUNTER — OFFICE VISIT (OUTPATIENT)
Dept: PHYSICAL THERAPY | Age: 68
End: 2018-10-15
Attending: INTERNAL MEDICINE
Payer: MEDICARE

## 2018-10-15 PROCEDURE — 97110 THERAPEUTIC EXERCISES: CPT

## 2018-10-15 PROCEDURE — 97112 NEUROMUSCULAR REEDUCATION: CPT

## 2018-10-15 NOTE — PROGRESS NOTES
Dx: Spinal stenosis        Authorized # of Visits:  medicare        Subjective: Has maintained progress and HEP since last seen. Reports lower legs continue to improve (no noticeable symptoms for several days). .  Main priority now is her chronic back pain There Act:  Correction, cues, practice with decreasing swayback posture, excessive trunk extension with standing and walking  6 min Man PT:  L and R sidelying:  Grade 3,4 lumbar spine gapping and distraction mobes all lumbar segments  15 min Man PT:  L and bent knee fallouts  -bent knee marches  (add march to HEP)  30 min total NM re ed:   Instruct/perform  Segmental trunk flexion/ext from chair  -add tissue mobilization to paraspinals during flexion,   -add at sustained end range flexion  12 min total There biofeedback pressure cuff  -baseline 20 mm/hg  20 min                                             Assessment: Maintained progress with HEP since last seen. Pt demo good lumbar spine ROM all motions including full flexion (goal 2).  Performed and made minor

## 2018-10-18 ENCOUNTER — OFFICE VISIT (OUTPATIENT)
Dept: PHYSICAL THERAPY | Age: 68
End: 2018-10-18
Attending: INTERNAL MEDICINE
Payer: MEDICARE

## 2018-10-18 PROCEDURE — 97112 NEUROMUSCULAR REEDUCATION: CPT

## 2018-10-18 NOTE — PROGRESS NOTES
Dx: Spinal stenosis        Authorized # of Visits:  medicare       Physical Therapy Progress note  10 sessions completed       Subjective: Reports her LE pain is negligible and does not inhibit activity. Back pain has steadily improved.   Has daily symptom exercise. Met  -Independent in progressive HEP.  Met    New goals (met in 4 sessions)  -single leg balance < 3 seconds each  -decrease fear of falling as noted with performing TUG test in < 10 seconds (WNL's)    Plan: Check HEP, continue to progress to CKC W each  8 min NM re ed:  - R Slump  Self neural mobe  R LE x 10  2 sets There Act:  Correction, cues, practice with decreasing swayback posture, excessive trunk extension with standing and walking  6 min Man PT:  L and R sidelying:  Grade 3,4 lumbar spine ga swing phase and  and stance phase  with B UE assist  5-10 rep sets  10 min  (add HEP)     NM re ed:  Hook lying: ab draw in maneuver, unable to contract  -use biofeedback pressure cuff  -baseline 20 mm/hg Man PT:  L and R sidelying:  Grade 3,4 lumbar spine mm/hg  -progress to 10 second holds at 40 mm/hg  - with bent knee marches  -progress HEP with resisted bent knee raises 3 sec holds x 10 each  15 min total NM re ed:   On each side:  -ab brace with hip ER (clam),  Hold 5 sec x 10 each side  10 min  (add HEP

## 2018-10-19 ENCOUNTER — HOSPITAL ENCOUNTER (OUTPATIENT)
Dept: MAMMOGRAPHY | Age: 68
Discharge: HOME | End: 2018-10-19
Attending: INTERNAL MEDICINE
Payer: MEDICARE

## 2018-10-19 DIAGNOSIS — Z12.31 ENCOUNTER FOR SCREENING MAMMOGRAM FOR MALIGNANT NEOPLASM OF BREAST: ICD-10-CM

## 2018-10-19 PROCEDURE — 77067 SCR MAMMO BI INCL CAD: CPT | Performed by: INTERNAL MEDICINE

## 2018-10-19 PROCEDURE — 77063 BREAST TOMOSYNTHESIS BI: CPT | Performed by: INTERNAL MEDICINE

## 2018-10-22 ENCOUNTER — OFFICE VISIT (OUTPATIENT)
Dept: PHYSICAL THERAPY | Age: 68
End: 2018-10-22
Attending: INTERNAL MEDICINE
Payer: MEDICARE

## 2018-10-22 PROCEDURE — 97116 GAIT TRAINING THERAPY: CPT

## 2018-10-22 PROCEDURE — 97112 NEUROMUSCULAR REEDUCATION: CPT

## 2018-10-22 NOTE — PROGRESS NOTES
Dx: Spinal stenosis        Authorized # of Visits:  medicare       Subjective: Reports HEP being done as asked. Has noticed some decrease in fear of falling with HEP, confidence improving with L LE.    Has noticed improved bed mobility, greater ease with im and walking  6 min Man PT:  L and R sidelying:  Grade 3,4 lumbar spine gapping and distraction mobes all lumbar segments  15 min Man PT:  L and R sidelying:  Grade 3,4 lumbar spine gapping and distraction mobes all lumbar segments  15 min NM re ed:  - R Sl ed:  Instruct/perform  Segmental trunk flexion/ext from chair  -add tissue mobilization to paraspinals during flexion,   -add at sustained end range flexion  12 min total There ex:  Sit:trunk flexion with hands on 10 inch platform,  --add at sustained end WFL's  No pain (improved)  Trunk strength WFL's (improved)  Neural tension tests now negative  Single leg balance 0 each  Gait independent with decreased L LE stance time, slight lateral sway    Assessment: Orders received to cont PT x 4 additional session

## 2018-10-25 ENCOUNTER — OFFICE VISIT (OUTPATIENT)
Dept: PHYSICAL THERAPY | Age: 68
End: 2018-10-25
Attending: INTERNAL MEDICINE
Payer: MEDICARE

## 2018-10-25 PROCEDURE — 97530 THERAPEUTIC ACTIVITIES: CPT

## 2018-10-25 PROCEDURE — 97112 NEUROMUSCULAR REEDUCATION: CPT

## 2018-10-25 NOTE — PROGRESS NOTES
Dx: Spinal stenosis        Authorized # of Visits:  medicare       Subjective: Reports HEP being done as asked. Has noticed some decrease in fear of falling with HEP, confidence improving with L LE.    Has noticed improved bed mobility, greater ease with im balance, various trials each LE  15 min NM re ed: (10 min)  Stand back to wall  -cues for posture to correct posterior lean, R side bend in standing.  -perform R and L lat stretches with arms folded overhead  15-20 sec holds 3-4 reps each  (add HEP)    NM knee raises 5 sec holds  X 12 each  15 min total In doorway:  Practice alt high stepping with min B UE assist  5-10 rep sets  (add HEP)   10 min  In doorway  -practice heel/toe gait, swing phase and  and stance phase  with B UE assist  5-10 rep sets  10 mi min)  In doorway:  Practice alt high stepping with B then  min single UE assist  10 rep sets  3 sets  10 min   In doorway  -practice heel/toe gait, swing phase and  and stance phase  With B then  single  UE assist 10 rep sets  3 sets    NM re ed:  Carmela Null perform full lumbar flexion, decrease compression on spinal segments.  Met  -Improve abdominal strength to WFL's as noted with biofeedback pressure cuff so pt can reduce her excessive lumbar lordosis and compression to spinal segments so she can stand for 1

## 2018-10-29 ENCOUNTER — APPOINTMENT (OUTPATIENT)
Dept: PHYSICAL THERAPY | Age: 68
End: 2018-10-29
Attending: INTERNAL MEDICINE
Payer: MEDICARE

## 2018-11-01 ENCOUNTER — OFFICE VISIT (OUTPATIENT)
Dept: INTERNAL MEDICINE CLINIC | Facility: CLINIC | Age: 68
End: 2018-11-01
Payer: MEDICARE

## 2018-11-01 VITALS
HEART RATE: 60 BPM | DIASTOLIC BLOOD PRESSURE: 70 MMHG | TEMPERATURE: 98 F | BODY MASS INDEX: 34.26 KG/M2 | RESPIRATION RATE: 16 BRPM | SYSTOLIC BLOOD PRESSURE: 136 MMHG | WEIGHT: 183.81 LBS | HEIGHT: 61.5 IN

## 2018-11-01 DIAGNOSIS — M25.531 PAIN IN BOTH WRISTS: ICD-10-CM

## 2018-11-01 DIAGNOSIS — M25.542 ARTHRALGIA OF BOTH HANDS: ICD-10-CM

## 2018-11-01 DIAGNOSIS — M25.532 PAIN IN BOTH WRISTS: ICD-10-CM

## 2018-11-01 DIAGNOSIS — Z00.00 ENCOUNTER FOR ANNUAL HEALTH EXAMINATION: Primary | ICD-10-CM

## 2018-11-01 DIAGNOSIS — E78.00 PURE HYPERCHOLESTEROLEMIA: ICD-10-CM

## 2018-11-01 DIAGNOSIS — Z23 NEEDS FLU SHOT: ICD-10-CM

## 2018-11-01 DIAGNOSIS — M25.541 ARTHRALGIA OF BOTH HANDS: ICD-10-CM

## 2018-11-01 DIAGNOSIS — R25.2 LEG CRAMPS: ICD-10-CM

## 2018-11-01 DIAGNOSIS — M48.061 SPINAL STENOSIS OF LUMBAR REGION, UNSPECIFIED WHETHER NEUROGENIC CLAUDICATION PRESENT: ICD-10-CM

## 2018-11-01 PROCEDURE — 90653 IIV ADJUVANT VACCINE IM: CPT | Performed by: INTERNAL MEDICINE

## 2018-11-01 PROCEDURE — G0439 PPPS, SUBSEQ VISIT: HCPCS | Performed by: INTERNAL MEDICINE

## 2018-11-01 PROCEDURE — G0008 ADMIN INFLUENZA VIRUS VAC: HCPCS | Performed by: INTERNAL MEDICINE

## 2018-11-01 NOTE — PATIENT INSTRUCTIONS
[address] SCHEDULE   Tests on this list are recommended by your physician but may not be covered, or covered at this frequency, by your insurer. Please check with your insurance carrier before scheduling to verify coverage.    PREV aneurysm screening (once between ages 73-68) IPPE only No results found for this or any previous visit.  Limited to patients who meet one of the following criteria:   • Men who are 73-68 years old and have smoked more than 100 cigarettes in their lifetime Mammogram due on 10/19/2019 Please get this Mammogram regularly   Immunizations      Influenza  Covered Annually Orders placed or performed in visit on 10/04/16   • FLU VACC [address] ANTIG   Orders placed or performed in visit on 10/04/12   • INFLUENZ Advance Directives. It also has the State forms available on it's website for anyone to review and print using their home computer and printer. (the forms are also available in [address])  [email or web address]  This link also has information from the [facility]

## 2018-11-01 NOTE — PROGRESS NOTES
HPI:   Terra Holly is a 70year old female who presents for a Medicare Subsequent Annual Wellness visit (Pt already had Initial Annual Wellness).     Status post resection of meningoma March 2016, HTN, HL, spinal stenosis with chronic back pain Madyson Gomez was screened for Alcohol abuse and had a score of 0 so is at low risk.     Patient Care Team: Patient Care Team:  Marcella Nieto MD as PCP - General  Eboni Saucedo PT as Physical Therapist    Patient Active Problem List:     HTN (hypert Oral Tab TAKE 1 TABLET(10 MG) BY MOUTH EVERY NIGHT   CARVEDILOL 6.25 MG Oral Tab TAKE 1 TABLET(6.25 MG) BY MOUTH TWICE DAILY WITH MEALS   ketorolac tromethamine 0.5 % Ophthalmic Solution    Timolol Maleate 0.5 % Ophthalmic Solution INT 1 GTT IN [facility] EYE QA midline, no adenopathy;  thyroid: not enlarged, symmetric, no tenderness/mass/nodules; no carotid bruit or JVD   Back:   Lumbar spine TTP   Lungs:   Clear to auscultation bilaterally, respirations unlabored   Heart:  Normal s1 s2   Abdomen:   Soft, non-ten consider injections again. PT is helping    Leg cramps - push fluids with electrolytes, check labs. Some of the leg pain is related to spinal stenosis, improving with PT  -     COMP METABOLIC PANEL (14);  Future  -     CBC WITH DIFFERENTIAL WITH PLATELET; F No results found for: FOB No flowsheet data found. Glaucoma Screening      Ophthalmology Visit Annually: Diabetics, FHx Glaucoma, AA>50, > 65 No flowsheet data found.     Bone Density Screening      Dexascan Every two years Last Dexa Scan:   DEXA digoxin diuretics, anticonvulsants.)    Potassium  Annually Potassium (mmol/L)   Date Value   08/20/2018 4.6     POTASSIUM (mmol/L)   Date Value   05/17/2014 4.1   10/20/2012 4.1   07/16/2011 4.7    No flowsheet data found.     Creatinine  Annually CREATINI

## 2018-11-05 ENCOUNTER — APPOINTMENT (OUTPATIENT)
Dept: PHYSICAL THERAPY | Age: 68
End: 2018-11-05
Attending: INTERNAL MEDICINE
Payer: MEDICARE

## 2018-11-06 DIAGNOSIS — E78.00 PURE HYPERCHOLESTEROLEMIA: ICD-10-CM

## 2018-11-06 RX ORDER — ATORVASTATIN CALCIUM 10 MG/1
TABLET, FILM COATED ORAL
Qty: 90 TABLET | Refills: 1 | Status: SHIPPED | OUTPATIENT
Start: 2018-11-06 | End: 2019-11-26

## 2018-11-09 ENCOUNTER — TELEPHONE (OUTPATIENT)
Dept: INTERNAL MEDICINE CLINIC | Facility: CLINIC | Age: 68
End: 2018-11-09

## 2018-11-09 DIAGNOSIS — Z78.0 POST-MENOPAUSAL: Primary | ICD-10-CM

## 2018-11-09 NOTE — TELEPHONE ENCOUNTER
Pt called and stated that TB was going put in an order for a Bone density scan and when she went to schedule the test and kandy advised that they do not have the order.      Pt is asking if TB can please order

## 2018-11-09 NOTE — TELEPHONE ENCOUNTER
Spoke with patient informed bone scan order placed. Patient verbalized understanding and agreeable to POC.

## 2018-11-09 NOTE — TELEPHONE ENCOUNTER
LOV-8/10/2018  Dexa bone scan results pended for your review (please add dx code) and approval if ok. Please advise.

## 2018-11-12 ENCOUNTER — OFFICE VISIT (OUTPATIENT)
Dept: PHYSICAL THERAPY | Age: 68
End: 2018-11-12
Attending: INTERNAL MEDICINE
Payer: MEDICARE

## 2018-11-12 PROCEDURE — 97112 NEUROMUSCULAR REEDUCATION: CPT

## 2018-11-12 PROCEDURE — 97530 THERAPEUTIC ACTIVITIES: CPT

## 2018-11-12 PROCEDURE — 97140 MANUAL THERAPY 1/> REGIONS: CPT

## 2018-11-12 NOTE — PROGRESS NOTES
Dx: Spinal stenosis        Authorized # of Visits:  medicare       Subjective:  Reports she has been practicing descending stairs reciprocally, B rail assist at home. Some L knee pain at times when doing that. Same with ascending reciprocally.    Katelynn snow R and L lat stretches with arms folded overhead  15-20 sec holds 3-4 reps each  (add HEP) Man PT:   R L4/L5 and L5/S1 gapping grade 4 mobes  13 min   NM re ed:  - R Slump  Self neural mobe  R LE x 10  2 sets There Act:  Correction, cues, practice with decr pressure cuff  -baseline 35 mm/hg  -progress to 10 second holds at 44 mm/hg  - with resisted bent knee raises 5 sec holds  X 12 each  15 min total In doorway:  Practice alt high stepping with min B UE assist  5-10 rep sets  (add HEP)   10 min  In doorway center independently, cues for increased L stance time and R lE swing phase  -amb to stairs:  -stairs done with 1 rail assist step to pattern   -progress to reciprocal ascend  -progress to reciprocal descend  15 min total :   NM re ed: (15 min)  In doorway reciprocally using railings. Mild knee pain with stair use was addressed. Correction of lumbo pelvic decreased stability resolved knee pain. This was also reviewed with sit to stand transfers which appeared to be cause of back pain.   Positional fault at

## 2018-11-15 ENCOUNTER — HOSPITAL ENCOUNTER (OUTPATIENT)
Dept: BONE DENSITY | Age: 68
Discharge: HOME | End: 2018-11-15
Attending: INTERNAL MEDICINE
Payer: MEDICARE

## 2018-11-15 ENCOUNTER — LAB ENCOUNTER (OUTPATIENT)
Dept: LAB | Age: 68
End: 2018-11-15
Attending: INTERNAL MEDICINE
Payer: MEDICARE

## 2018-11-15 DIAGNOSIS — M25.531 PAIN IN BOTH WRISTS: ICD-10-CM

## 2018-11-15 DIAGNOSIS — E78.00 PURE HYPERCHOLESTEROLEMIA: ICD-10-CM

## 2018-11-15 DIAGNOSIS — Z78.0 POST-MENOPAUSAL: ICD-10-CM

## 2018-11-15 DIAGNOSIS — R25.2 LEG CRAMPS: ICD-10-CM

## 2018-11-15 DIAGNOSIS — M25.541 ARTHRALGIA OF BOTH HANDS: ICD-10-CM

## 2018-11-15 DIAGNOSIS — M25.532 PAIN IN BOTH WRISTS: ICD-10-CM

## 2018-11-15 DIAGNOSIS — M25.542 ARTHRALGIA OF BOTH HANDS: ICD-10-CM

## 2018-11-15 PROCEDURE — 77080 DXA BONE DENSITY AXIAL: CPT | Performed by: INTERNAL MEDICINE

## 2018-11-15 PROCEDURE — 36415 COLL VENOUS BLD VENIPUNCTURE: CPT

## 2018-11-15 PROCEDURE — 85025 COMPLETE CBC W/AUTO DIFF WBC: CPT

## 2018-11-15 PROCEDURE — 86038 ANTINUCLEAR ANTIBODIES: CPT

## 2018-11-15 PROCEDURE — 86431 RHEUMATOID FACTOR QUANT: CPT

## 2018-11-15 PROCEDURE — 80053 COMPREHEN METABOLIC PANEL: CPT

## 2018-11-15 PROCEDURE — 85652 RBC SED RATE AUTOMATED: CPT

## 2018-11-19 ENCOUNTER — OFFICE VISIT (OUTPATIENT)
Dept: PHYSICAL THERAPY | Age: 68
End: 2018-11-19
Attending: INTERNAL MEDICINE
Payer: MEDICARE

## 2018-11-19 PROCEDURE — 97140 MANUAL THERAPY 1/> REGIONS: CPT

## 2018-11-19 PROCEDURE — 97112 NEUROMUSCULAR REEDUCATION: CPT

## 2018-11-19 NOTE — PROGRESS NOTES
Dx: Spinal stenosis        Authorized # of Visits:  medicare       Subjective:  Reports return of central LB pain when walking 1 block or >. Pain ranges 2/10 to 4/10.      Objective:   10/18  tx 10 10/22  tx 11 10/26  tx 12 11/12  tx 13 11/19  tx 14    NM r stance phase  with B UE assist  5-10 rep sets  10 min  (add HEP)   In doorway:  Practice alt high stepping with min single UE (progress HEP) assist  5-10 rep sets  10 min   In doorway  -practice heel/toe gait, swing phase and  and stance phase  With single sit to stand:  -cues for ab brace, posture, increase L WB)  5 rep sets x 3  (resume previous HEP)                                         Lumbar spine AROM: all motions symmetrical WFL's  No pain (improved)  Trunk strength WFL's (improved)  Neural tension Treatment Time: 55 min

## 2018-11-26 ENCOUNTER — OFFICE VISIT (OUTPATIENT)
Dept: PHYSICAL THERAPY | Age: 68
End: 2018-11-26
Attending: INTERNAL MEDICINE
Payer: MEDICARE

## 2018-11-26 PROCEDURE — 97112 NEUROMUSCULAR REEDUCATION: CPT

## 2018-11-26 PROCEDURE — 97140 MANUAL THERAPY 1/> REGIONS: CPT

## 2018-11-26 PROCEDURE — 97110 THERAPEUTIC EXERCISES: CPT

## 2018-11-26 NOTE — PROGRESS NOTES
Dx: Spinal stenosis        Authorized # of Visits:  medicare    Physical Therapy Discharge Report   15 sessions completed     Subjective:  Reports significant improvement with pain in LE's. Has no symptoms most days.   Sporadic R lateral lower leg low grad spinal segments so she can stand for 10-15 minutes to prepare meals, perform self care ADL's. Met  -Reduce lower leg pain from 10/10 at worse to 3-4/10 so pt can resume ambulating in the community or at her fitness for regular exercise. Met  -Independent in heel/toe gait and stance phase symmetry  in p bars with B UE assist, progress to single.    10 min NM re ed:  (upward scap rotation)  Supine:  UE 45/45  Add AROM slides to extended overhead 10 reps 2 sets  (add HEP) NM re ed: (15 min)  Stand back to wall  - then  single  UE assist 10 rep sets  (add ab brace)     NM re ed: 30 min  Stand back to wall  -perform R and L lat stretches with arms folded overhead (add ab brace)  15-20 sec holds 3-4 reps each    L side to wall:  L LE knee slides into wall, with ab bra

## 2018-12-27 ENCOUNTER — PATIENT MESSAGE (OUTPATIENT)
Dept: INTERNAL MEDICINE CLINIC | Facility: CLINIC | Age: 68
End: 2018-12-27

## 2018-12-27 NOTE — TELEPHONE ENCOUNTER
From: Nellie Howard  To: Jennifer Higuera MD  Sent: 12/27/2018 9:43 AM CST  Subject: Other    Hi Dr Dacia Dillon,  This Rosemarie Moles. For last couple of weeks I have been having pain above and below my right elbow.  I took some Aleeve and some adrián

## 2018-12-27 NOTE — TELEPHONE ENCOUNTER
TB- will provide pt with supportive measures- Ibuprofen alternate with Tylenol as needed for additional pain control. May apply cold compress. Limit use, elevate, epsom salt baths help relax muscles.     Wanted to check first, ok to use triage spot on 12/28

## 2018-12-28 ENCOUNTER — OFFICE VISIT (OUTPATIENT)
Dept: INTERNAL MEDICINE CLINIC | Facility: CLINIC | Age: 68
End: 2018-12-28
Payer: MEDICARE

## 2018-12-28 VITALS
WEIGHT: 180.38 LBS | TEMPERATURE: 98 F | SYSTOLIC BLOOD PRESSURE: 124 MMHG | HEART RATE: 66 BPM | BODY MASS INDEX: 33.62 KG/M2 | HEIGHT: 61.5 IN | DIASTOLIC BLOOD PRESSURE: 72 MMHG | RESPIRATION RATE: 16 BRPM

## 2018-12-28 DIAGNOSIS — M77.11 LATERAL EPICONDYLITIS OF RIGHT ELBOW: Primary | ICD-10-CM

## 2018-12-28 PROCEDURE — 99213 OFFICE O/P EST LOW 20 MIN: CPT | Performed by: INTERNAL MEDICINE

## 2018-12-28 RX ORDER — NAPROXEN 500 MG/1
TABLET ORAL
Qty: 180 TABLET | Refills: 0 | OUTPATIENT
Start: 2018-12-28

## 2018-12-28 RX ORDER — NAPROXEN 500 MG/1
500 TABLET ORAL 2 TIMES DAILY WITH MEALS
Qty: 60 TABLET | Refills: 0 | Status: SHIPPED | OUTPATIENT
Start: 2018-12-28 | End: 2019-01-24

## 2018-12-28 NOTE — PROGRESS NOTES
Viry Kumari is a 70year old female. Patient presents with:  Elbow Pain: Rm 4. Pt states rt elbow pain. Onset 2 weeks. Pain does not radiate into the upper or lower arm. Pt denies any type of injury.       HPI:     Patient c/o right elbow pain fo • Hyperlipidemia    • Osteoarthritis    • Pure hypercholesterolemia       Social History:  Social History    Tobacco Use      Smoking status: Never Smoker      Smokeless tobacco: Never Used    Alcohol use: No      Alcohol/week: 0.0 oz      Comment: occ Consults:  None    Return in about 2 weeks (around 1/11/2019) for f/u. There are no Patient Instructions on file for this visit. The patient indicates understanding of these issues and agrees to the plan.

## 2019-01-02 ENCOUNTER — TELEPHONE (OUTPATIENT)
Dept: INTERNAL MEDICINE CLINIC | Facility: CLINIC | Age: 69
End: 2019-01-02

## 2019-01-02 DIAGNOSIS — M77.11 LATERAL EPICONDYLITIS OF RIGHT ELBOW: Primary | ICD-10-CM

## 2019-01-07 ENCOUNTER — OFFICE VISIT (OUTPATIENT)
Dept: PHYSICAL THERAPY | Age: 69
End: 2019-01-07
Attending: INTERNAL MEDICINE
Payer: MEDICARE

## 2019-01-07 DIAGNOSIS — M77.11 LATERAL EPICONDYLITIS OF RIGHT ELBOW: ICD-10-CM

## 2019-01-07 PROCEDURE — 97112 NEUROMUSCULAR REEDUCATION: CPT

## 2019-01-07 PROCEDURE — 97163 PT EVAL HIGH COMPLEX 45 MIN: CPT

## 2019-01-07 NOTE — PROGRESS NOTES
UPPER EXTREMITY EVALUATION:   Referring Physician: Dr. Damon Pepe  Diagnosis: R lower cervical foraminal stenosis with median nerve irritation      Date of Service: 1/7/2019     PATIENT SUMMARY   Patti Martinez is a 70year old  female who presents t Increased C/T junction. Has excessive UE humeral IR L and R, protracted scapulae. Cervical Screen: All motions WNL's. R side bend with and w/o overpressure produce elbow region complaints and 2,3 digit tingling.    Palpation: tender at radial nerve elbo Potential:good    FOTO: 37/100    Patient/Family/Caregiver was advised of these findings, precautions, and treatment options and has agreed to actively participate in planning and for this course of care.     Thank you for your referral. Please co-sign or s

## 2019-01-09 ENCOUNTER — APPOINTMENT (OUTPATIENT)
Dept: PHYSICAL THERAPY | Age: 69
End: 2019-01-09
Attending: INTERNAL MEDICINE
Payer: MEDICARE

## 2019-01-10 ENCOUNTER — OFFICE VISIT (OUTPATIENT)
Dept: PHYSICAL THERAPY | Age: 69
End: 2019-01-10
Attending: INTERNAL MEDICINE
Payer: MEDICARE

## 2019-01-10 PROCEDURE — 97140 MANUAL THERAPY 1/> REGIONS: CPT

## 2019-01-10 PROCEDURE — 97112 NEUROMUSCULAR REEDUCATION: CPT

## 2019-01-10 NOTE — PROGRESS NOTES
Dx:       R lower cervical foraminal stenosis with median nerve irritation     Authorized # of Visits:  10 medicare          Subjective: Doing HEP, no noticeable improvement. L elbow region and arm symptoms come and go.   Uses UE sling to rest arm periodica mobility to WNL's so pt can perform R side bend AROM w/o production of R elbow symptoms.  -Resolve R UE neural tension so pt can perform UE ADL's such as bathing and dressing w/o elbow region pain.   -Carry items such as a bag of groceries w/o elbow region

## 2019-01-14 ENCOUNTER — OFFICE VISIT (OUTPATIENT)
Dept: PHYSICAL THERAPY | Age: 69
End: 2019-01-14
Attending: INTERNAL MEDICINE
Payer: MEDICARE

## 2019-01-14 ENCOUNTER — APPOINTMENT (OUTPATIENT)
Dept: PHYSICAL THERAPY | Age: 69
End: 2019-01-14
Attending: INTERNAL MEDICINE
Payer: MEDICARE

## 2019-01-14 PROCEDURE — 97140 MANUAL THERAPY 1/> REGIONS: CPT

## 2019-01-14 PROCEDURE — 97112 NEUROMUSCULAR REEDUCATION: CPT

## 2019-01-14 NOTE — PROGRESS NOTES
Dx:       R lower cervical foraminal stenosis with median nerve irritation     Authorized # of Visits:  10 medicare            Subjective: Not using sling as asked  Doing HEP. Has noticed decrease frequency of  symptoms during the day and night.  Still ha spine,  Correct cervical posture, perform L and R cervical rotation                     Assessment: No symptoms prior to during or after tx, neg neural tension tests. Pain intensity and frequency decreasing but remains daily.   Reviewed HEP, minor correcti

## 2019-01-16 ENCOUNTER — APPOINTMENT (OUTPATIENT)
Dept: PHYSICAL THERAPY | Age: 69
End: 2019-01-16
Attending: INTERNAL MEDICINE
Payer: MEDICARE

## 2019-01-17 ENCOUNTER — OFFICE VISIT (OUTPATIENT)
Dept: PHYSICAL THERAPY | Age: 69
End: 2019-01-17
Attending: INTERNAL MEDICINE
Payer: MEDICARE

## 2019-01-17 PROCEDURE — 97140 MANUAL THERAPY 1/> REGIONS: CPT

## 2019-01-17 PROCEDURE — 97112 NEUROMUSCULAR REEDUCATION: CPT

## 2019-01-17 NOTE — PROGRESS NOTES
Dx:       R lower cervical foraminal stenosis with median nerve irritation     Authorized # of Visits:  10 medicare            Subjective:   Reports has returned to using R UE more with house chores, etc.  Pain at arm is intermittent, better.   Symptoms bry sets  Passive R UE neural glides #1 position x 10 2 sets       Sit: R upper trap stretch seated, t/c for head alinement, posture, (no cervical extension)  Hold 15 sec x 4  (add HEP Sit: R upper trap stretch seated, t/c for head alinement, posture, (no cerv Total Timed Treatment: 45  min  Total Treatment Time: 45 min

## 2019-01-21 ENCOUNTER — APPOINTMENT (OUTPATIENT)
Dept: PHYSICAL THERAPY | Age: 69
End: 2019-01-21
Attending: INTERNAL MEDICINE
Payer: MEDICARE

## 2019-01-21 ENCOUNTER — OFFICE VISIT (OUTPATIENT)
Dept: PHYSICAL THERAPY | Age: 69
End: 2019-01-21
Attending: INTERNAL MEDICINE
Payer: MEDICARE

## 2019-01-21 PROCEDURE — 97140 MANUAL THERAPY 1/> REGIONS: CPT

## 2019-01-21 PROCEDURE — 97112 NEUROMUSCULAR REEDUCATION: CPT

## 2019-01-21 NOTE — PROGRESS NOTES
Dx:       R lower cervical foraminal stenosis with median nerve irritation     Authorized # of Visits:  10 medicare            Subjective:   Pain at arm is intermittent, better. Not having consisten night pain. Can sleep 1-2 night w/o symptoms.   Symptoms Supine:  Cranial flexion with chin nod hold 15 sec x 5  ( HEP) Supine:  R UE passive neural mobes median and radial n.  5 min      NM re ed: 35 min  Supine:  Cranial flexion with chin nod hold 15 sec x 5  T/c to assist   (add HEP) Supine: cervical rotati at this facility by this PT for B sciatica R>L. Review  lumbar gapping ex and neural mobes from previous tx. Back and upper leg symptoms were resolved, calf symptoms reduced to slight. HO issued for al HEP. Had no R UE symptoms after tx.  All goals being

## 2019-01-23 ENCOUNTER — APPOINTMENT (OUTPATIENT)
Dept: PHYSICAL THERAPY | Age: 69
End: 2019-01-23
Attending: INTERNAL MEDICINE
Payer: MEDICARE

## 2019-01-24 ENCOUNTER — OFFICE VISIT (OUTPATIENT)
Dept: PHYSICAL THERAPY | Age: 69
End: 2019-01-24
Attending: INTERNAL MEDICINE
Payer: MEDICARE

## 2019-01-24 PROCEDURE — 97112 NEUROMUSCULAR REEDUCATION: CPT

## 2019-01-24 NOTE — PROGRESS NOTES
Dx:       R lower cervical foraminal stenosis with median nerve irritation     Authorized # of Visits:  10 medicare            Subjective:   Pain at arm is intermittent, better. Upper and lower arm pain rarely if at all present.   Pain is mainly lateral elb side bend AROM w/o production of R elbow symptoms.  -Resolve R UE neural tension so pt can perform UE ADL's such as bathing and dressing w/o elbow region pain.   -Carry items such as a bag of groceries w/o elbow region pain.   -Independent in progressive H 10 min   supine:  Grade 3,4 up and fwd mobes C6 to T1  -manual traction x 20 sec x 3 Cranial flexion, add cervical flexion AAROM to 20*, hold 5 sec x 5  2 sets NM re ed:   Supine:  Cranial flexion with chin nod hold 15 sec x 5  ( HEP) Supine:  R UE passive 10 reps   (add HEP) -add R UE neural glides median nerve, increase L LE  -add L UE neural glides median nerve, increase produce R LE paresthesia     As above in standing, back to wall for neutral spine,  Correct cervical posture, perform L and R cervical r

## 2019-01-25 RX ORDER — NAPROXEN 500 MG/1
TABLET ORAL
Qty: 60 TABLET | Refills: 0 | Status: SHIPPED | OUTPATIENT
Start: 2019-01-25 | End: 2019-03-26

## 2019-01-25 NOTE — TELEPHONE ENCOUNTER
LOV: 12/28/18 w/ TB  FOV: None  Last labs: 11/15/18  Last Refill: 12/28/18 qt:60    Per protocol sent to provider

## 2019-01-28 ENCOUNTER — OFFICE VISIT (OUTPATIENT)
Dept: PHYSICAL THERAPY | Age: 69
End: 2019-01-28
Attending: INTERNAL MEDICINE
Payer: MEDICARE

## 2019-01-28 PROCEDURE — 97112 NEUROMUSCULAR REEDUCATION: CPT

## 2019-01-28 NOTE — PROGRESS NOTES
Dx:       R lower cervical foraminal stenosis with median nerve irritation     Authorized # of Visits:  10 medicare            Subjective:   Reports R elbow pain slowly improving. Performs HEP more frequently as asked.   Symptom decrease or resolve for 1-2 with passive cervical flexion 10 reps NM re ed:45 min  Supine: grade 2 A/P glide R C6/C7 with passive cervical flexion 10 reps -self R UE neural mobe ULTT #1 position x 10 rep  Resolve elbow  Remain resolved Cranial flexion, add cervical flexion AAROM to 2 wall slides ABD x 10  2 sets  (add HEP) Sit: L LE self neural mobe with knee ext and ankle DF x 15 reps  (add HEP) Supine:  Produce L lower leg and big toe paresthesia  -add cervical extension, decrease lower leg symptoms  -add cervical flexion increase sy

## 2019-01-28 NOTE — TELEPHONE ENCOUNTER
LOV: 12/28/18 w/ TB  FOV: None  Last labs: 11/15/18  Last Refill: 8/15/18 qt:7.5 mL     Per protocol sent to provider

## 2019-01-29 RX ORDER — AZITHROMYCIN 250 MG/1
TABLET, FILM COATED ORAL
Qty: 6 TABLET | Refills: 0 | Status: SHIPPED | OUTPATIENT
Start: 2019-01-29 | End: 2019-04-10

## 2019-01-29 RX ORDER — LATANOPROST 50 UG/ML
SOLUTION/ DROPS OPHTHALMIC
Qty: 7.5 ML | Refills: 0 | Status: SHIPPED | OUTPATIENT
Start: 2019-01-29 | End: 2019-04-06

## 2019-03-26 RX ORDER — NAPROXEN 500 MG/1
TABLET ORAL
Qty: 180 TABLET | Refills: 0 | Status: SHIPPED | OUTPATIENT
Start: 2019-03-26 | End: 2019-05-30

## 2019-03-26 NOTE — TELEPHONE ENCOUNTER
LOV:12/28.18 TB  FOV:none on file   LAST RX:1/25/19 500 mg take 1 tab twice a day with meals 60 tabs 0 refills   LAST LABS:11/15/18 sed rate,jimmy direct screen,cmp,rheumatoid arthritis,cbc  PER PROTOCOL: to provider

## 2019-04-06 NOTE — TELEPHONE ENCOUNTER
Last Office Visit: 12-28-18 with TB for elbow pain   Last Rx Filled: 1-29-19 7.5ml with no refills   Last Labs: 11-15-18 sed rate/jimmy/cmp/rh/cbc  Future Appointment: none    Per protocol to provider

## 2019-04-08 RX ORDER — LATANOPROST 50 UG/ML
SOLUTION/ DROPS OPHTHALMIC
Qty: 7.5 ML | Refills: 0 | Status: SHIPPED | OUTPATIENT
Start: 2019-04-08 | End: 2019-09-29

## 2019-04-10 ENCOUNTER — OFFICE VISIT (OUTPATIENT)
Dept: INTERNAL MEDICINE CLINIC | Facility: CLINIC | Age: 69
End: 2019-04-10
Payer: MEDICARE

## 2019-04-10 VITALS
RESPIRATION RATE: 16 BRPM | DIASTOLIC BLOOD PRESSURE: 72 MMHG | SYSTOLIC BLOOD PRESSURE: 128 MMHG | WEIGHT: 177 LBS | BODY MASS INDEX: 32.99 KG/M2 | HEIGHT: 61.5 IN | HEART RATE: 60 BPM

## 2019-04-10 DIAGNOSIS — M54.16 LUMBAR RADICULOPATHY: Primary | ICD-10-CM

## 2019-04-10 DIAGNOSIS — G89.4 CHRONIC PAIN SYNDROME: ICD-10-CM

## 2019-04-10 DIAGNOSIS — R45.89 DEPRESSED MOOD: ICD-10-CM

## 2019-04-10 PROCEDURE — 99213 OFFICE O/P EST LOW 20 MIN: CPT | Performed by: INTERNAL MEDICINE

## 2019-04-10 RX ORDER — METHYLPREDNISOLONE 4 MG/1
TABLET ORAL
Qty: 1 KIT | Refills: 0 | Status: SHIPPED | OUTPATIENT
Start: 2019-04-10 | End: 2019-05-30 | Stop reason: ALTCHOICE

## 2019-04-10 RX ORDER — DULOXETIN HYDROCHLORIDE 30 MG/1
30 CAPSULE, DELAYED RELEASE ORAL DAILY
Qty: 30 CAPSULE | Refills: 2 | Status: SHIPPED | OUTPATIENT
Start: 2019-04-10 | End: 2019-07-04

## 2019-04-10 NOTE — PROGRESS NOTES
Bill Whalen is a 78year old female. Patient presents with: Follow - Up: TN Exam Room 4: follow up right leg pain, back pain and right arm pain. Pt states she completed PT. Traveled to [facility] and now pain is worse.   6/10 today      HPI:     Pa Disp: 90 tablet Rfl: 1   VENTOLIN  (90 Base) MCG/ACT Inhalation Aero Soln INHALE 2 PUFFS PO UP TO TID Disp:  Rfl: 2   Trolamine Salicylate 10 % External Cream Apply topically. Disp:  Rfl:    Turmeric Does not apply Powder by Misc.  (Non-Drug;Combo Ro rate without respiratory distress, lungs clear to auscultation  CARDIO: RRR nl S1 S2  GI: normal bowel sounds, soft, NT/ND  EXTREMITIES: no cyanosis, clubbing or edema  Spine: lumbar spine TTP, +SLR on right  NEURO: Alert and oriented    ASSESSMENT AND KLEBER

## 2019-05-30 NOTE — PROGRESS NOTES
Radha Castillo is a 78year old female.   Patient presents with:  Leg Pain: SN Rm 4; R leg pain F/U, has had 2 injections  Other: requesting lipid and CMP for cardiologist  Medication Follow-Up: cymbalta helping      HPI:     Pleasant patient here wi not apply Powder by Misc. (Non-Drug;Combo Route) route.  Disp:  Rfl:    CARVEDILOL 6.25 MG Oral Tab TAKE 1 TABLET(6.25 MG) BY MOUTH TWICE DAILY WITH MEALS Disp: 180 tablet Rfl: 3   ketorolac tromethamine 0.5 % Ophthalmic Solution  Disp:  Rfl:    Timolol Mal soft, NT/ND  EXTREMITIES: no cyanosis, clubbing or edema  NEURO: Alert and oriented, no focal deficits    ASSESSMENT AND PLAN:   Mixed hyperlipidemia - on atorvastatin, check labs and forward to cardiologist Dr. Louie Mckeon hypertension- controlle

## 2019-06-10 RX ORDER — CARVEDILOL 6.25 MG/1
TABLET ORAL
Qty: 180 TABLET | Refills: 0 | Status: SHIPPED | OUTPATIENT
Start: 2019-06-10 | End: 2019-09-12

## 2019-06-10 NOTE — TELEPHONE ENCOUNTER
Last Ov: 5/30/19, TB, acute  Upcoming appt: no upcoming scheduled appt  Last labs: CBC, RH Factor, CMP, LISA, Sed Rate 11/15/18  Last Rx: carvedilol 6.25mg, #180, 3 R 6/13/18    Per Protocol, passed.  Rx refill sent to pharmacy

## 2019-06-24 RX ORDER — NAPROXEN 500 MG/1
TABLET ORAL
Qty: 180 TABLET | Refills: 0 | Status: SHIPPED | OUTPATIENT
Start: 2019-06-24 | End: 2019-09-27

## 2019-06-24 NOTE — TELEPHONE ENCOUNTER
Last Ov: 5/30/19, TB, acute  Upcoming appt: no upcoming appt  Last labs: CBC, RH factor, CMP, LISA, Sed Rate 11/15/18  Last Rx: naproxen 500mg, #60, 0 R     *Rx was D/C on 3/26/19 by TB*    Per Protocol, not on protocol.  Rx pending, please sign if appropria

## 2019-07-01 ENCOUNTER — LAB ENCOUNTER (OUTPATIENT)
Dept: LAB | Age: 69
End: 2019-07-01
Attending: INTERNAL MEDICINE
Payer: MEDICARE

## 2019-07-01 DIAGNOSIS — I82.513 CHRONIC EMBOLISM AND THROMBOSIS OF BOTH FEMORAL VEINS (HCC): Primary | ICD-10-CM

## 2019-07-01 DIAGNOSIS — E78.2 MIXED HYPERLIPIDEMIA: ICD-10-CM

## 2019-07-01 LAB
ALBUMIN SERPL-MCNC: 3.3 G/DL (ref 3.4–5)
ALBUMIN/GLOB SERPL: 1.2 {RATIO} (ref 1–2)
ALP LIVER SERPL-CCNC: 69 U/L (ref 55–142)
ALT SERPL-CCNC: 30 U/L (ref 13–56)
ANION GAP SERPL CALC-SCNC: 5 MMOL/L (ref 0–18)
AST SERPL-CCNC: 16 U/L (ref 15–37)
BILIRUB SERPL-MCNC: 0.6 MG/DL (ref 0.1–2)
BUN BLD-MCNC: 18 MG/DL (ref 7–18)
BUN/CREAT SERPL: 20.9 (ref 10–20)
CALCIUM BLD-MCNC: 9.2 MG/DL (ref 8.5–10.1)
CHLORIDE SERPL-SCNC: 110 MMOL/L (ref 98–112)
CHOLEST SMN-MCNC: 205 MG/DL (ref ?–200)
CO2 SERPL-SCNC: 25 MMOL/L (ref 21–32)
CREAT BLD-MCNC: 0.86 MG/DL (ref 0.55–1.02)
GLOBULIN PLAS-MCNC: 2.8 G/DL (ref 2.8–4.4)
GLUCOSE BLD-MCNC: 93 MG/DL (ref 70–99)
HDLC SERPL-MCNC: 83 MG/DL (ref 40–59)
LDLC SERPL CALC-MCNC: 103 MG/DL (ref ?–100)
M PROTEIN MFR SERPL ELPH: 6.1 G/DL (ref 6.4–8.2)
NONHDLC SERPL-MCNC: 122 MG/DL (ref ?–130)
OSMOLALITY SERPL CALC.SUM OF ELEC: 292 MOSM/KG (ref 275–295)
POTASSIUM SERPL-SCNC: 4.4 MMOL/L (ref 3.5–5.1)
SODIUM SERPL-SCNC: 140 MMOL/L (ref 136–145)
TRIGL SERPL-MCNC: 96 MG/DL (ref 30–149)
VLDLC SERPL CALC-MCNC: 19 MG/DL (ref 0–30)

## 2019-07-01 PROCEDURE — 80053 COMPREHEN METABOLIC PANEL: CPT

## 2019-07-01 PROCEDURE — 80061 LIPID PANEL: CPT

## 2019-07-04 DIAGNOSIS — G89.4 CHRONIC PAIN SYNDROME: ICD-10-CM

## 2019-07-04 DIAGNOSIS — R45.89 DEPRESSED MOOD: ICD-10-CM

## 2019-07-05 RX ORDER — DULOXETIN HYDROCHLORIDE 30 MG/1
CAPSULE, DELAYED RELEASE ORAL
Qty: 90 CAPSULE | Refills: 3 | Status: SHIPPED | OUTPATIENT
Start: 2019-07-05 | End: 2020-06-26

## 2019-07-05 NOTE — TELEPHONE ENCOUNTER
Last Ov: 5/30/19, TB, acute  Upcoming appt: no upcoming appt  Last labs: CMP, Lipid 7/1/19  Last Rx: duloxetine 30mg, #30, 2R 4/10/19    Per Protocol, not on protocol. Rx pending to pharmacy, please sign if appropriate.

## 2019-08-01 ENCOUNTER — OFFICE VISIT (OUTPATIENT)
Dept: INTERNAL MEDICINE CLINIC | Facility: CLINIC | Age: 69
End: 2019-08-01
Payer: MEDICARE

## 2019-08-01 VITALS
RESPIRATION RATE: 18 BRPM | DIASTOLIC BLOOD PRESSURE: 72 MMHG | HEIGHT: 61.5 IN | WEIGHT: 175.81 LBS | BODY MASS INDEX: 32.77 KG/M2 | HEART RATE: 68 BPM | SYSTOLIC BLOOD PRESSURE: 126 MMHG

## 2019-08-01 DIAGNOSIS — M70.21 OLECRANON BURSITIS OF RIGHT ELBOW: Primary | ICD-10-CM

## 2019-08-01 DIAGNOSIS — D64.9 NORMOCYTIC ANEMIA: ICD-10-CM

## 2019-08-01 PROCEDURE — 99213 OFFICE O/P EST LOW 20 MIN: CPT | Performed by: INTERNAL MEDICINE

## 2019-08-01 RX ORDER — DORZOLAMIDE HYDROCHLORIDE AND TIMOLOL MALEATE 20; 5 MG/ML; MG/ML
SOLUTION/ DROPS OPHTHALMIC
Refills: 0 | COMMUNITY
Start: 2019-06-18

## 2019-08-01 NOTE — PROGRESS NOTES
Mindy Moscoso is a 78year old female. Patient presents with:  Elbow Pain: cn room 3: R elbow pain       HPI:     Patient here with c/o right elbow pain and swelling.   She noted a swelling at the tip of her elbow that 'stretched the skin' at the t Osteoarthritis    • Pure hypercholesterolemia       Social History:  Social History    Tobacco Use      Smoking status: Never Smoker      Smokeless tobacco: Never Used    Alcohol use: No      Alcohol/week: 0.0 standard drinks      Comment: occ    Drug use: Patient Instructions on file for this visit. The patient indicates understanding of these issues and agrees to the plan.

## 2019-09-06 ENCOUNTER — LAB ENCOUNTER (OUTPATIENT)
Dept: LAB | Age: 69
End: 2019-09-06
Attending: INTERNAL MEDICINE
Payer: MEDICARE

## 2019-09-06 DIAGNOSIS — D64.9 NORMOCYTIC ANEMIA: ICD-10-CM

## 2019-09-06 DIAGNOSIS — M70.21 OLECRANON BURSITIS OF RIGHT ELBOW: ICD-10-CM

## 2019-09-06 LAB
BASOPHILS # BLD AUTO: 0.07 X10(3) UL (ref 0–0.2)
BASOPHILS NFR BLD AUTO: 1.3 %
DEPRECATED RDW RBC AUTO: 50.3 FL (ref 35.1–46.3)
EOSINOPHIL # BLD AUTO: 0.28 X10(3) UL (ref 0–0.7)
EOSINOPHIL NFR BLD AUTO: 5.1 %
ERYTHROCYTE [DISTWIDTH] IN BLOOD BY AUTOMATED COUNT: 14.5 % (ref 11–15)
HCT VFR BLD AUTO: 37.2 % (ref 35–48)
HGB BLD-MCNC: 11.8 G/DL (ref 12–16)
IMM GRANULOCYTES # BLD AUTO: 0.01 X10(3) UL (ref 0–1)
IMM GRANULOCYTES NFR BLD: 0.2 %
LYMPHOCYTES # BLD AUTO: 2.14 X10(3) UL (ref 1–4)
LYMPHOCYTES NFR BLD AUTO: 39.3 %
MCH RBC QN AUTO: 30.2 PG (ref 26–34)
MCHC RBC AUTO-ENTMCNC: 31.7 G/DL (ref 31–37)
MCV RBC AUTO: 95.1 FL (ref 80–100)
MONOCYTES # BLD AUTO: 0.54 X10(3) UL (ref 0.1–1)
MONOCYTES NFR BLD AUTO: 9.9 %
NEUTROPHILS # BLD AUTO: 2.41 X10 (3) UL (ref 1.5–7.7)
NEUTROPHILS # BLD AUTO: 2.41 X10(3) UL (ref 1.5–7.7)
NEUTROPHILS NFR BLD AUTO: 44.2 %
PLATELET # BLD AUTO: 173 10(3)UL (ref 150–450)
RBC # BLD AUTO: 3.91 X10(6)UL (ref 3.8–5.3)
T3FREE SERPL-MCNC: 2.77 PG/ML (ref 2.4–4.2)
T4 FREE SERPL-MCNC: 1 NG/DL (ref 0.8–1.7)
TSI SER-ACNC: 0.3 MIU/ML (ref 0.36–3.74)
WBC # BLD AUTO: 5.5 X10(3) UL (ref 4–11)

## 2019-09-06 PROCEDURE — 84439 ASSAY OF FREE THYROXINE: CPT

## 2019-09-06 PROCEDURE — 84481 FREE ASSAY (FT-3): CPT

## 2019-09-06 PROCEDURE — 85025 COMPLETE CBC W/AUTO DIFF WBC: CPT

## 2019-09-06 PROCEDURE — 84443 ASSAY THYROID STIM HORMONE: CPT

## 2019-09-11 ENCOUNTER — OFFICE VISIT (OUTPATIENT)
Dept: RHEUMATOLOGY | Facility: CLINIC | Age: 69
End: 2019-09-11
Payer: MEDICARE

## 2019-09-11 ENCOUNTER — APPOINTMENT (OUTPATIENT)
Dept: LAB | Age: 69
End: 2019-09-11
Attending: INTERNAL MEDICINE
Payer: MEDICARE

## 2019-09-11 DIAGNOSIS — M25.421 EFFUSION OF RIGHT ELBOW: ICD-10-CM

## 2019-09-11 DIAGNOSIS — M15.9 PRIMARY OSTEOARTHRITIS INVOLVING MULTIPLE JOINTS: ICD-10-CM

## 2019-09-11 DIAGNOSIS — M48.062 SPINAL STENOSIS OF LUMBAR REGION WITH NEUROGENIC CLAUDICATION: ICD-10-CM

## 2019-09-11 DIAGNOSIS — M25.521 RIGHT ELBOW PAIN: Primary | ICD-10-CM

## 2019-09-11 LAB
BASOPHIL SYNOVIAL FLUID: 0 %
EOSINOPHIL SYNOVIAL FLUID: 0 %
LYMPH SYNOVIAL FLUID: 61 %
MON/MAC/HIST SYNOVIAL FLUID: 22 %
NEUTROPHIL SYNOVIAL FLUID: 17 % (ref ?–20)
TOTAL CELLS COUNTED: 100

## 2019-09-11 PROCEDURE — 89050 BODY FLUID CELL COUNT: CPT | Performed by: INTERNAL MEDICINE

## 2019-09-11 PROCEDURE — 89060 EXAM SYNOVIAL FLUID CRYSTALS: CPT | Performed by: INTERNAL MEDICINE

## 2019-09-11 PROCEDURE — 89051 BODY FLUID CELL COUNT: CPT | Performed by: INTERNAL MEDICINE

## 2019-09-11 PROCEDURE — 99203 OFFICE O/P NEW LOW 30 MIN: CPT | Performed by: INTERNAL MEDICINE

## 2019-09-11 RX ORDER — TRIAMCINOLONE ACETONIDE 40 MG/ML
40 INJECTION, SUSPENSION INTRA-ARTICULAR; INTRAMUSCULAR ONCE
Status: SHIPPED | OUTPATIENT
Start: 2019-09-11

## 2019-09-11 RX ORDER — LIDOCAINE HYDROCHLORIDE 10 MG/ML
5 INJECTION, SOLUTION INFILTRATION; PERINEURAL ONCE
Status: SHIPPED | OUTPATIENT
Start: 2019-09-11

## 2019-09-12 LAB
CRYSTALS: NEGATIVE
RBC SYNOVIAL FLUID: ABNORMAL /MM3
WBC SYNOVIAL FLUID: 354 /MM3 (ref ?–150)

## 2019-09-12 RX ORDER — CARVEDILOL 6.25 MG/1
TABLET ORAL
Qty: 180 TABLET | Refills: 0 | Status: SHIPPED | OUTPATIENT
Start: 2019-09-12 | End: 2019-12-13

## 2019-09-12 NOTE — TELEPHONE ENCOUNTER
Last Ov: 8/1/19, TB, acute  Upcoming appt: 11/11/19  Last labs: CMP, Lipid, TSH w Ref T4, CBC,  Free T4, Free T3 9/6/19  Last Rx: carvedilol 6.25mg, #180, 0R 6/10/19    Per Protocol, passed. Refill sent.

## 2019-09-13 NOTE — PROGRESS NOTES
?  RHEUMATOLOGY NEW PATIENT   Date of visit: 9/11/2019  ? Patient presents with:  Joint Pain: right elbow with swelling  ?   ASSESSMENT, DISCUSSION & PLAN   Assessment:  Right elbow pain  (primary encounter diagnosis)  Effusion of right elbow  Spinal clarence right elbow  -     URIC ACID, SERUM; Future  -     CELL CT/DIF & CRYSTAL SYNOVIAL; Future  -     lidocaine HCl (XYLOCAINE) 1 % injection 5 mL  -     triamcinolone acetonide (KENALOG-40) 40 MG/ML injection 40 mg  -     CELL COUNT AND CRYSTALS SYNOVIAL;  Futu The patient denies oral or nasal ulcers, photosensitive rash, elevated or scarring rashes, Raynaud's phenomenon. Denies nonhealing ulcers on the fingertips, trouble swallowing, or severe acid reflux.   The patient denies any history of uveitis, crampy Previous Medication    CARVEDILOL 6.25 MG ORAL TAB CARVEDILOL 6.25 MG Oral Tab       TAKE 1 TABLET(6.25 MG) BY MOUTH TWICE DAILY WITH MEALS    TAKE 1 TABLET(6.25 MG) BY MOUTH TWICE DAILY WITH MEALS     There are no discontinued medications.   ?  ?  Allergie present and nodular abnormality. No swelling, tenderness, redness or restriction of motion of the DIPs, PIPs, MCPs, wrists, ankles, or joints of the feet. Bilateral shoulders with full ROM.      Neurological: She is alert and oriented to person, place, a Eva Haro,   [facility]  9/13/2019

## 2019-09-16 NOTE — PROCEDURES
Right Elbow Procedure Note    Procedure: Ultrasound examination of the right elbow  Equipment: [employer] ultrasound machine. Indication: Pain in right elbow, difficulty performing blind aspiration/injection. Obesity no  ?   Findings:  The patient wa cc of pink synovial fluid aspirated, cloudy. Then, lidocaine/40mg kenalog was injected to the area.      There were no complications and followup instructions were given      No outpatient medications have been marked as taking for the 9/11/19 encounter (Of

## 2019-09-26 ENCOUNTER — APPOINTMENT (OUTPATIENT)
Dept: LAB | Age: 69
End: 2019-09-26
Attending: INTERNAL MEDICINE
Payer: MEDICARE

## 2019-09-26 DIAGNOSIS — M25.421 EFFUSION OF RIGHT ELBOW: ICD-10-CM

## 2019-09-26 DIAGNOSIS — M25.521 RIGHT ELBOW PAIN: ICD-10-CM

## 2019-09-26 LAB — URATE SERPL-MCNC: 6.1 MG/DL (ref 2.6–6)

## 2019-09-26 PROCEDURE — 84550 ASSAY OF BLOOD/URIC ACID: CPT

## 2019-09-27 RX ORDER — NAPROXEN 500 MG/1
500 TABLET ORAL 2 TIMES DAILY PRN
Qty: 180 TABLET | Refills: 0 | Status: SHIPPED | OUTPATIENT
Start: 2019-09-27 | End: 2019-12-26

## 2019-09-27 NOTE — TELEPHONE ENCOUNTER
LOV:8/1/19 TB  FOV:11/11/19   LAST RX:6/24/19 500 mg take 1 tab twice a day 180 caps 0 refills   LAST LABS:9/26/19 uric acid   PER PROTOCOL: to provider

## 2019-09-30 RX ORDER — LATANOPROST 50 UG/ML
SOLUTION/ DROPS OPHTHALMIC
Qty: 7.5 ML | Refills: 0 | Status: SHIPPED | OUTPATIENT
Start: 2019-09-30 | End: 2019-12-05

## 2019-09-30 NOTE — TELEPHONE ENCOUNTER
Last Ov: 8/1/19, TB, acute  Upcoming appt: 11/11/19, TB  Last labs: uric acid 9/26/19  Last Rx: lantanoprost 0/005%,  7.5mL, 0R 4/8/19    Per Protocol, not on protocol. Rx pending.

## 2019-10-10 ENCOUNTER — ORDER TRANSCRIPTION (OUTPATIENT)
Dept: ADMINISTRATIVE | Facility: HOSPITAL | Age: 69
End: 2019-10-10

## 2019-10-10 DIAGNOSIS — Z12.31 ENCOUNTER FOR SCREENING MAMMOGRAM FOR MALIGNANT NEOPLASM OF BREAST: Primary | ICD-10-CM

## 2019-11-05 ENCOUNTER — HOSPITAL ENCOUNTER (OUTPATIENT)
Dept: MAMMOGRAPHY | Age: 69
Discharge: HOME | End: 2019-11-05
Attending: INTERNAL MEDICINE
Payer: MEDICARE

## 2019-11-05 DIAGNOSIS — Z12.31 ENCOUNTER FOR SCREENING MAMMOGRAM FOR MALIGNANT NEOPLASM OF BREAST: ICD-10-CM

## 2019-11-05 PROCEDURE — 77063 BREAST TOMOSYNTHESIS BI: CPT | Performed by: INTERNAL MEDICINE

## 2019-11-05 PROCEDURE — 77067 SCR MAMMO BI INCL CAD: CPT | Performed by: INTERNAL MEDICINE

## 2019-11-11 ENCOUNTER — OFFICE VISIT (OUTPATIENT)
Dept: INTERNAL MEDICINE CLINIC | Facility: CLINIC | Age: 69
End: 2019-11-11
Payer: MEDICARE

## 2019-11-11 VITALS
RESPIRATION RATE: 18 BRPM | BODY MASS INDEX: 34.39 KG/M2 | TEMPERATURE: 98 F | HEIGHT: 60 IN | SYSTOLIC BLOOD PRESSURE: 132 MMHG | HEART RATE: 64 BPM | WEIGHT: 175.19 LBS | DIASTOLIC BLOOD PRESSURE: 74 MMHG

## 2019-11-11 DIAGNOSIS — Z51.81 ENCOUNTER FOR MONITORING CHRONIC NSAID THERAPY: ICD-10-CM

## 2019-11-11 DIAGNOSIS — I10 ESSENTIAL HYPERTENSION: ICD-10-CM

## 2019-11-11 DIAGNOSIS — R94.6 BORDERLINE ABNORMAL TFTS: ICD-10-CM

## 2019-11-11 DIAGNOSIS — Z79.1 ENCOUNTER FOR MONITORING CHRONIC NSAID THERAPY: ICD-10-CM

## 2019-11-11 DIAGNOSIS — M48.061 SPINAL STENOSIS OF LUMBAR REGION, UNSPECIFIED WHETHER NEUROGENIC CLAUDICATION PRESENT: ICD-10-CM

## 2019-11-11 DIAGNOSIS — Z00.00 ENCOUNTER FOR ANNUAL HEALTH EXAMINATION: Primary | ICD-10-CM

## 2019-11-11 DIAGNOSIS — Z23 NEED FOR INFLUENZA VACCINATION: ICD-10-CM

## 2019-11-11 DIAGNOSIS — D64.9 NORMOCYTIC ANEMIA: ICD-10-CM

## 2019-11-11 DIAGNOSIS — E78.00 PURE HYPERCHOLESTEROLEMIA: ICD-10-CM

## 2019-11-11 DIAGNOSIS — Z86.018 STATUS POST RESECTION OF MENINGIOMA: ICD-10-CM

## 2019-11-11 DIAGNOSIS — Z98.890 STATUS POST RESECTION OF MENINGIOMA: ICD-10-CM

## 2019-11-11 PROCEDURE — G0439 PPPS, SUBSEQ VISIT: HCPCS | Performed by: INTERNAL MEDICINE

## 2019-11-11 PROCEDURE — G0008 ADMIN INFLUENZA VIRUS VAC: HCPCS | Performed by: INTERNAL MEDICINE

## 2019-11-11 PROCEDURE — 90662 IIV NO PRSV INCREASED AG IM: CPT | Performed by: INTERNAL MEDICINE

## 2019-11-11 NOTE — PATIENT INSTRUCTIONS
[address] SCHEDULE   Tests on this list are recommended by your physician but may not be covered, or covered at this frequency, by your insurer. Please check with your insurance carrier before scheduling to verify coverage.    PREV screening (once between ages 73-68) IPPE only No results found for this or any previous visit.  Limited to patients who meet one of the following criteria:   • Men who are 73-68 years old and have smoked more than 100 cigarettes in their lifetime   • Anyone needed after 75 Mammogram due on 11/05/2020 Please get this Mammogram regularly   Immunizations      Influenza  Covered Annually Orders placed or performed in visit on 10/04/16   • FLU VACC [address] ANTIG   Orders placed or performed in visit on 10/04 different types of Advance Directives. It also has the State forms available on it's website for anyone to review and print using their home computer and printer. (the forms are also available in [address])  [email or web address] org  This link also has informa

## 2019-11-11 NOTE — PROGRESS NOTES
HPI:   Colleen Moctezuma is a 78year old female who presents for a Medicare Subsequent Annual Wellness visit (Pt already had Initial Annual Wellness). Patient here for wellness.   H/o meningioma resection in 2016, HTN, HL, spinal stenosis, depress was screened for Alcohol abuse and had a score of 0 so is at low risk.     Patient Care Team: Patient Care Team:  Kristie Francisco MD as PCP - General  [facility]  Ember Pena PT as Physical Therapist    Patient Active Problem L daily.  ATORVASTATIN 10 MG Oral Tab, TAKE 1 TABLET(10 MG) BY MOUTH EVERY NIGHT  Trolamine Salicylate 10 % External Cream, Apply topically. Turmeric Does not apply Powder, by Misc.  (Non-Drug;Combo Route) route.    lidocaine HCl (XYLOCAINE) 1 % injection 5 midline, no adenopathy;  thyroid: not enlarged, symmetric, no tenderness/mass/nodules; no carotid bruit or JVD   Back:   Symmetric, no curvature, ROM normal, no CVA tenderness   Lungs:   Clear to auscultation bilaterally, respirations unlabored   Heart:  R FREE    Encounter for monitoring chronic NSAID therapy- monitor renal panel, pt aware of possible GI side effects and takes her naproxen with food  -     CBC WITH DIFFERENTIAL WITH PLATELET;  Future    Viral syndrome- rest, fluids, robitussin DM prn    Stat Colorectal Cancer Screening      Colonoscopy Screen every 10 years Colonoscopy due on 11/29/2022 Update [facility] if applicable    Flex Sigmoidoscopy Screen every 10 years No results found for this or any previous visit. No flowsheet data found. cover unless Medically needed    Zoster  Not covered by Medicare Part B No vaccine history found This may be covered with your pharmacy  prescription benefits      [address] [facility] Internal Lab or Procedure External Lab or Procedure      Annual

## 2019-11-15 DIAGNOSIS — I10 ESSENTIAL HYPERTENSION: ICD-10-CM

## 2019-11-16 RX ORDER — FUROSEMIDE 20 MG/1
TABLET ORAL
Qty: 90 TABLET | Refills: 0 | Status: SHIPPED | OUTPATIENT
Start: 2019-11-16 | End: 2020-05-12

## 2019-11-26 DIAGNOSIS — E78.00 PURE HYPERCHOLESTEROLEMIA: ICD-10-CM

## 2019-11-26 RX ORDER — ATORVASTATIN CALCIUM 10 MG/1
TABLET, FILM COATED ORAL
Qty: 90 TABLET | Refills: 1 | Status: SHIPPED | OUTPATIENT
Start: 2019-11-26 | End: 2020-05-29

## 2019-12-05 RX ORDER — LATANOPROST 50 UG/ML
SOLUTION/ DROPS OPHTHALMIC
Qty: 7.5 ML | Refills: 0 | Status: SHIPPED | OUTPATIENT
Start: 2019-12-05 | End: 2020-03-02

## 2019-12-05 NOTE — TELEPHONE ENCOUNTER
LOV:11/11/19 TB  FOV:none on file   LAST RX:9/30/19 0.005% instill 1 drop in both eyes every night 7.5 ml 0 refills   LAST LABS:9/26/19 uric acid   PER PROTOCOL: to provider

## 2019-12-13 RX ORDER — CARVEDILOL 6.25 MG/1
TABLET ORAL
Qty: 180 TABLET | Refills: 0 | Status: SHIPPED | OUTPATIENT
Start: 2019-12-13 | End: 2020-03-11

## 2019-12-26 RX ORDER — NAPROXEN 500 MG/1
TABLET ORAL
Qty: 180 TABLET | Refills: 0 | Status: SHIPPED | OUTPATIENT
Start: 2019-12-26 | End: 2020-03-30

## 2019-12-26 NOTE — TELEPHONE ENCOUNTER
Last Ov: 11/11/19, TB, physical  Upcoming appt: no upcoming appt  Last labs: Lipid, CMP, Free T3, Free T4, CBC, TSH w Ref 9/6/19  Last Rx: Naproxen 500mg, #180, 0R 9/27/19    Per Protocol, not on protocol. Rx pending.

## 2020-02-13 RX ORDER — OSELTAMIVIR PHOSPHATE 75 MG/1
75 CAPSULE ORAL 2 TIMES DAILY
Qty: 10 CAPSULE | Refills: 0 | Status: SHIPPED | OUTPATIENT
Start: 2020-02-13 | End: 2020-02-18 | Stop reason: ALTCHOICE

## 2020-02-16 RX ORDER — ALBUTEROL SULFATE 90 UG/1
1 AEROSOL, METERED RESPIRATORY (INHALATION) EVERY 6 HOURS PRN
Qty: 1 INHALER | Refills: 2 | Status: SHIPPED | OUTPATIENT
Start: 2020-02-16 | End: 2021-07-22

## 2020-02-18 ENCOUNTER — HOSPITAL ENCOUNTER (OUTPATIENT)
Dept: GENERAL RADIOLOGY | Facility: HOSPITAL | Age: 70
Discharge: HOME | End: 2020-02-18
Attending: INTERNAL MEDICINE
Payer: MEDICARE

## 2020-02-18 ENCOUNTER — OFFICE VISIT (OUTPATIENT)
Dept: INTERNAL MEDICINE CLINIC | Facility: CLINIC | Age: 70
End: 2020-02-18
Payer: MEDICARE

## 2020-02-18 ENCOUNTER — TELEPHONE (OUTPATIENT)
Dept: INTERNAL MEDICINE CLINIC | Facility: CLINIC | Age: 70
End: 2020-02-18

## 2020-02-18 VITALS
OXYGEN SATURATION: 99 % | RESPIRATION RATE: 16 BRPM | TEMPERATURE: 98 F | HEIGHT: 60 IN | HEART RATE: 68 BPM | DIASTOLIC BLOOD PRESSURE: 60 MMHG | SYSTOLIC BLOOD PRESSURE: 106 MMHG | BODY MASS INDEX: 34.75 KG/M2 | WEIGHT: 177 LBS

## 2020-02-18 DIAGNOSIS — R06.2 EXPIRATORY WHEEZING ON LEFT SIDE OF CHEST: ICD-10-CM

## 2020-02-18 DIAGNOSIS — J40 BRONCHITIS: Primary | ICD-10-CM

## 2020-02-18 PROCEDURE — 99213 OFFICE O/P EST LOW 20 MIN: CPT | Performed by: INTERNAL MEDICINE

## 2020-02-18 PROCEDURE — 71046 X-RAY EXAM CHEST 2 VIEWS: CPT | Performed by: INTERNAL MEDICINE

## 2020-02-18 RX ORDER — BENZONATATE 100 MG/1
100 CAPSULE ORAL 3 TIMES DAILY PRN
Qty: 21 CAPSULE | Refills: 0 | Status: SHIPPED | OUTPATIENT
Start: 2020-02-18 | End: 2020-02-25

## 2020-02-18 RX ORDER — PREDNISONE 20 MG/1
20 TABLET ORAL DAILY
Qty: 5 TABLET | Refills: 0 | Status: SHIPPED | OUTPATIENT
Start: 2020-02-18 | End: 2020-02-23

## 2020-02-18 RX ORDER — CODEINE PHOSPHATE AND GUAIFENESIN 10; 100 MG/5ML; MG/5ML
5 SOLUTION ORAL EVERY EVENING
Qty: 35 ML | Refills: 0 | Status: SHIPPED | OUTPATIENT
Start: 2020-02-18 | End: 2020-02-25

## 2020-02-18 NOTE — PROGRESS NOTES
Joshua Berman  3/27/1950    Patient presents with:  Cough: x 1wk, productive cough, +SOB/wheezing, denies fevers/chills, using OTC Delsyum w/ some relief      SUBJECTIVE   Joshua Berman is a 78year old female who presents with a cough. TABLETS(40 MG) BY MOUTH DAILY 90 tablet 0   • Dorzolamide HCl-Timolol Mal 22.3-6.8 MG/ML Ophthalmic Solution   0   • DULOXETINE HCL 30 MG Oral Cap DR Particles TAKE 1 CAPSULE(30 MG) BY MOUTH DAILY 90 capsule 3   • Trolamine Salicylate 10 % External Cream A and time. No distress. HEENT:  Normocephalic and atraumatic. TM's wnl. No sinus tenderness. Nose normal. Oropharynx is clear and moist.   Eyes: Conjunctivae wnl. Neck: Normal range of motion. Neck supple. Normal carotid pulses. No masses.   Momo Camarillo

## 2020-02-18 NOTE — TELEPHONE ENCOUNTER
Pt called refused to be seen by anyone but TB for a cold. She had no availability.  Suggested she [facility] to [facility]

## 2020-03-02 RX ORDER — LATANOPROST 50 UG/ML
SOLUTION/ DROPS OPHTHALMIC
Qty: 7.5 ML | Refills: 0 | Status: SHIPPED | OUTPATIENT
Start: 2020-03-02

## 2020-03-02 NOTE — TELEPHONE ENCOUNTER
Last Ov: 2/18/20, AD, acute  Upcoming appt: no upcoming appt  Last labs: uric acid 9/26/19  Last Rx: latanoprost 0.005%, 7.5mL, 0R 12/5/19    Per Protocol, not on protocol. Rx pending.

## 2020-03-03 RX ORDER — LATANOPROST 50 UG/ML
SOLUTION/ DROPS OPHTHALMIC
Qty: 10 ML | Refills: 0 | OUTPATIENT
Start: 2020-03-03

## 2020-03-11 RX ORDER — CARVEDILOL 6.25 MG/1
TABLET ORAL
Qty: 180 TABLET | Refills: 1 | Status: SHIPPED | OUTPATIENT
Start: 2020-03-11 | End: 2020-09-14

## 2020-03-30 RX ORDER — NAPROXEN 500 MG/1
TABLET ORAL
Qty: 180 TABLET | Refills: 0 | Status: SHIPPED | OUTPATIENT
Start: 2020-03-30 | End: 2020-06-26

## 2020-03-30 NOTE — TELEPHONE ENCOUNTER
Last Ov: 2/18/20, AD, acute  Upcoming appt: no upcoming appt  Last labs: uric acid 9/26/19  Last Rx: naproxen 500mg, #180, 0R 12/26/19    Per Protocol, not on protocol. Rx pending.

## 2020-04-27 DIAGNOSIS — B37.2 CANDIDA INFECTION OF FLEXURAL SKIN: Primary | ICD-10-CM

## 2020-04-27 RX ORDER — NYSTATIN 100000 U/G
CREAM TOPICAL
Qty: 30 G | Refills: 0 | Status: SHIPPED | OUTPATIENT
Start: 2020-04-27 | End: 2020-11-13

## 2020-05-05 RX ORDER — LATANOPROST 50 UG/ML
SOLUTION/ DROPS OPHTHALMIC
Qty: 7.5 ML | Refills: 0 | OUTPATIENT
Start: 2020-05-05

## 2020-05-05 NOTE — TELEPHONE ENCOUNTER
Please see if she can get this eye drops from her eye doctor  It is for elevated pressure in the eye which I do not manage.   I can send 1 refill if she cannot get hold of her eye doctor this month due to covid 19

## 2020-05-05 NOTE — TELEPHONE ENCOUNTER
Last Ov: 2/18/20, AD, acute  Upcoming appt: no upcoming appt  Last labs: uric acid 9/26/19  Last Rx: latanoprost 0.005% 7.5mL, 0R 3/2/20    Per Protocol, not on protocol. Rx pending.

## 2020-05-07 ENCOUNTER — MOBILE ENCOUNTER (OUTPATIENT)
Dept: RHEUMATOLOGY | Facility: CLINIC | Age: 70
End: 2020-05-07

## 2020-05-07 DIAGNOSIS — M25.561 ACUTE PAIN OF RIGHT KNEE: Primary | ICD-10-CM

## 2020-05-07 RX ORDER — METHYLPREDNISOLONE 4 MG/1
TABLET ORAL
Qty: 1 KIT | Refills: 0 | Status: SHIPPED | OUTPATIENT
Start: 2020-05-07 | End: 2020-11-13

## 2020-05-12 DIAGNOSIS — I10 ESSENTIAL HYPERTENSION: ICD-10-CM

## 2020-05-12 RX ORDER — FUROSEMIDE 20 MG/1
TABLET ORAL
Qty: 90 TABLET | Refills: 0 | Status: SHIPPED | OUTPATIENT
Start: 2020-05-12 | End: 2020-06-22

## 2020-05-12 NOTE — TELEPHONE ENCOUNTER
Last OV: 2/18/20 acute f/u    No future appointments. Latest labs: 9/6/19 TSH w/ ref, CBC and T4 and Free T3    Latest RX: FUROSEMIDE 20MG TABLETS 90 tabs 0 refills on 11/16/19    Per protocol, passed. Rx sent.

## 2020-05-13 RX ORDER — FUROSEMIDE 20 MG/1
TABLET ORAL
Qty: 180 TABLET | Refills: 0 | OUTPATIENT
Start: 2020-05-13

## 2020-05-29 DIAGNOSIS — E78.00 PURE HYPERCHOLESTEROLEMIA: ICD-10-CM

## 2020-05-29 RX ORDER — ATORVASTATIN CALCIUM 10 MG/1
TABLET, FILM COATED ORAL
Qty: 90 TABLET | Refills: 0 | Status: SHIPPED | OUTPATIENT
Start: 2020-05-29 | End: 2020-08-28

## 2020-06-22 DIAGNOSIS — I10 ESSENTIAL HYPERTENSION: ICD-10-CM

## 2020-06-22 RX ORDER — FUROSEMIDE 20 MG/1
TABLET ORAL
Qty: 180 TABLET | Refills: 0 | Status: SHIPPED | OUTPATIENT
Start: 2020-06-22 | End: 2020-09-24

## 2020-06-26 DIAGNOSIS — R45.89 DEPRESSED MOOD: ICD-10-CM

## 2020-06-26 DIAGNOSIS — G89.4 CHRONIC PAIN SYNDROME: ICD-10-CM

## 2020-06-26 RX ORDER — NAPROXEN 500 MG/1
TABLET ORAL
Qty: 180 TABLET | Refills: 0 | Status: SHIPPED | OUTPATIENT
Start: 2020-06-26 | End: 2020-09-23

## 2020-06-26 RX ORDER — DULOXETIN HYDROCHLORIDE 30 MG/1
CAPSULE, DELAYED RELEASE ORAL
Qty: 90 CAPSULE | Refills: 0 | Status: SHIPPED | OUTPATIENT
Start: 2020-06-26 | End: 2020-09-23

## 2020-06-26 NOTE — TELEPHONE ENCOUNTER
Last Ov:2/18/20 AD  Upcoming appt:none  Last labs:no recent blood work  Last Rx:3/30/20 naproxen 500mg, 7/5/19 duloxetine HCL 30mg    Per Protocol sent for review

## 2020-07-27 DIAGNOSIS — B37.2 CANDIDA INFECTION OF FLEXURAL SKIN: ICD-10-CM

## 2020-08-27 DIAGNOSIS — E78.00 PURE HYPERCHOLESTEROLEMIA: ICD-10-CM

## 2020-08-27 NOTE — TELEPHONE ENCOUNTER
Last Ov: 2/18/20, AD, acute  Last labs: Uric acid 9/26/19  Last Rx: atorvastatin 10mg, #90, 0R 5/29/20    No future appointments. Per Protocol - failed. Pt due for lipids, liver enzyme testing, and last ALT out of range. Rx pending.

## 2020-08-28 RX ORDER — ATORVASTATIN CALCIUM 10 MG/1
TABLET, FILM COATED ORAL
Qty: 90 TABLET | Refills: 0 | Status: SHIPPED | OUTPATIENT
Start: 2020-08-28 | End: 2020-11-28

## 2020-08-28 NOTE — TELEPHONE ENCOUNTER
[facility], labs completed today (8/28/20)

## 2020-08-31 ENCOUNTER — TELEPHONE (OUTPATIENT)
Dept: INTERNAL MEDICINE CLINIC | Facility: CLINIC | Age: 70
End: 2020-08-31

## 2020-08-31 NOTE — TELEPHONE ENCOUNTER
CPE   Future Appointments   Date Time Provider Carter Reina   11/13/2020  1:00 PM Alejandro Ricardo MD [facility]        Orders to  [facility]   aware must fast no call back required

## 2020-09-14 RX ORDER — CARVEDILOL 6.25 MG/1
TABLET ORAL
Qty: 180 TABLET | Refills: 1 | Status: SHIPPED | OUTPATIENT
Start: 2020-09-14 | End: 2021-03-12

## 2020-09-14 NOTE — TELEPHONE ENCOUNTER
Last OV 2.18.20 w/ AD (acute)   Last PE 11.11.19   Last REFILL 3.11.20 Carvedilol 6.25mg #180 1R  Last LABS No recent labs within last 12 months     Future Appointments   Date Time Provider [facility] Reina   11/13/2020  1:00 PM Syed Loja MD [facility]

## 2020-09-23 DIAGNOSIS — I10 ESSENTIAL HYPERTENSION: ICD-10-CM

## 2020-09-23 DIAGNOSIS — G89.4 CHRONIC PAIN SYNDROME: ICD-10-CM

## 2020-09-23 DIAGNOSIS — R45.89 DEPRESSED MOOD: ICD-10-CM

## 2020-09-23 RX ORDER — NAPROXEN 500 MG/1
TABLET ORAL
Qty: 180 TABLET | Refills: 0 | Status: SHIPPED | OUTPATIENT
Start: 2020-09-23 | End: 2020-12-23

## 2020-09-23 RX ORDER — DULOXETIN HYDROCHLORIDE 30 MG/1
CAPSULE, DELAYED RELEASE ORAL
Qty: 90 CAPSULE | Refills: 0 | Status: SHIPPED | OUTPATIENT
Start: 2020-09-23 | End: 2020-12-23

## 2020-09-23 NOTE — TELEPHONE ENCOUNTER
Last OV 2.18.20 w/ AD (acute)   Last PE 11.11.19   Last REFILL 6.26.20 Duloxetine 30mg #90 0R    6.26.20 Naproxen 500mg #180 0R  Last LABS No recent labs in last 12 months    Future Appointments   Date Time Provider Carter Huang   11/13/2020  1:00 PM

## 2020-09-24 RX ORDER — FUROSEMIDE 20 MG/1
TABLET ORAL
Qty: 180 TABLET | Refills: 0 | Status: SHIPPED | OUTPATIENT
Start: 2020-09-24 | End: 2020-11-13

## 2020-09-24 NOTE — TELEPHONE ENCOUNTER
Future Appointments   Date Time Provider Carter Reina   11/13/2020  1:00 PM Marcella Nieto MD [facility]

## 2020-09-24 NOTE — TELEPHONE ENCOUNTER
Last OV 2.18.20 w/ AD (acute)   Last PE 11.11.19   Last REFILL 6.22.20 Furosemide 20mg #180 0R  Last LABS No recent labs within last 12 months     Future Appointments   Date Time Provider [facility] Reina   11/13/2020  1:00 PM Yevgeniy Eaton MD [facility]

## 2020-11-05 ENCOUNTER — LAB ENCOUNTER (OUTPATIENT)
Dept: LAB | Age: 70
End: 2020-11-05
Attending: INTERNAL MEDICINE
Payer: MEDICARE

## 2020-11-05 DIAGNOSIS — Z00.00 ENCOUNTER FOR ANNUAL HEALTH EXAMINATION: ICD-10-CM

## 2020-11-05 DIAGNOSIS — E78.00 PURE HYPERCHOLESTEROLEMIA: ICD-10-CM

## 2020-11-05 DIAGNOSIS — D64.9 NORMOCYTIC ANEMIA: ICD-10-CM

## 2020-11-05 DIAGNOSIS — Z86.018 STATUS POST RESECTION OF MENINGIOMA: ICD-10-CM

## 2020-11-05 DIAGNOSIS — Z51.81 ENCOUNTER FOR MONITORING CHRONIC NSAID THERAPY: ICD-10-CM

## 2020-11-05 DIAGNOSIS — I10 ESSENTIAL HYPERTENSION: ICD-10-CM

## 2020-11-05 DIAGNOSIS — Z79.1 ENCOUNTER FOR MONITORING CHRONIC NSAID THERAPY: ICD-10-CM

## 2020-11-05 DIAGNOSIS — Z98.890 STATUS POST RESECTION OF MENINGIOMA: ICD-10-CM

## 2020-11-05 DIAGNOSIS — R94.6 BORDERLINE ABNORMAL TFTS: ICD-10-CM

## 2020-11-05 PROCEDURE — 80048 BASIC METABOLIC PNL TOTAL CA: CPT

## 2020-11-05 PROCEDURE — 83540 ASSAY OF IRON: CPT

## 2020-11-05 PROCEDURE — 84439 ASSAY OF FREE THYROXINE: CPT

## 2020-11-05 PROCEDURE — 83550 IRON BINDING TEST: CPT

## 2020-11-05 PROCEDURE — 82728 ASSAY OF FERRITIN: CPT

## 2020-11-05 PROCEDURE — 80076 HEPATIC FUNCTION PANEL: CPT

## 2020-11-05 PROCEDURE — 85025 COMPLETE CBC W/AUTO DIFF WBC: CPT

## 2020-11-05 PROCEDURE — 36415 COLL VENOUS BLD VENIPUNCTURE: CPT

## 2020-11-05 PROCEDURE — 80061 LIPID PANEL: CPT

## 2020-11-05 PROCEDURE — 84443 ASSAY THYROID STIM HORMONE: CPT

## 2020-11-05 PROCEDURE — 82607 VITAMIN B-12: CPT

## 2020-11-13 ENCOUNTER — OFFICE VISIT (OUTPATIENT)
Dept: INTERNAL MEDICINE CLINIC | Facility: CLINIC | Age: 70
End: 2020-11-13
Payer: MEDICARE

## 2020-11-13 VITALS
BODY MASS INDEX: 33.23 KG/M2 | HEART RATE: 56 BPM | DIASTOLIC BLOOD PRESSURE: 56 MMHG | RESPIRATION RATE: 16 BRPM | TEMPERATURE: 98 F | HEIGHT: 60.24 IN | WEIGHT: 171.5 LBS | SYSTOLIC BLOOD PRESSURE: 110 MMHG

## 2020-11-13 DIAGNOSIS — Z86.018 STATUS POST RESECTION OF MENINGIOMA: ICD-10-CM

## 2020-11-13 DIAGNOSIS — R92.2 DENSE BREAST: ICD-10-CM

## 2020-11-13 DIAGNOSIS — I10 ESSENTIAL HYPERTENSION: ICD-10-CM

## 2020-11-13 DIAGNOSIS — Z23 NEED FOR INFLUENZA VACCINATION: ICD-10-CM

## 2020-11-13 DIAGNOSIS — Z98.890 STATUS POST RESECTION OF MENINGIOMA: ICD-10-CM

## 2020-11-13 DIAGNOSIS — R45.89 DEPRESSED MOOD: ICD-10-CM

## 2020-11-13 DIAGNOSIS — Z12.31 ENCOUNTER FOR SCREENING MAMMOGRAM FOR MALIGNANT NEOPLASM OF BREAST: ICD-10-CM

## 2020-11-13 DIAGNOSIS — E78.00 PURE HYPERCHOLESTEROLEMIA: ICD-10-CM

## 2020-11-13 DIAGNOSIS — E53.8 B12 DEFICIENCY: ICD-10-CM

## 2020-11-13 DIAGNOSIS — Z00.00 ENCOUNTER FOR ANNUAL HEALTH EXAMINATION: Primary | ICD-10-CM

## 2020-11-13 DIAGNOSIS — D50.8 OTHER IRON DEFICIENCY ANEMIA: ICD-10-CM

## 2020-11-13 DIAGNOSIS — M48.061 SPINAL STENOSIS OF LUMBAR REGION, UNSPECIFIED WHETHER NEUROGENIC CLAUDICATION PRESENT: ICD-10-CM

## 2020-11-13 PROCEDURE — G0008 ADMIN INFLUENZA VIRUS VAC: HCPCS | Performed by: INTERNAL MEDICINE

## 2020-11-13 PROCEDURE — 90662 IIV NO PRSV INCREASED AG IM: CPT | Performed by: INTERNAL MEDICINE

## 2020-11-13 PROCEDURE — 96372 THER/PROPH/DIAG INJ SC/IM: CPT | Performed by: INTERNAL MEDICINE

## 2020-11-13 PROCEDURE — G0439 PPPS, SUBSEQ VISIT: HCPCS | Performed by: INTERNAL MEDICINE

## 2020-11-13 RX ORDER — FERROUS SULFATE 325(65) MG
325 TABLET ORAL
Qty: 90 TABLET | Refills: 1 | Status: SHIPPED | OUTPATIENT
Start: 2020-11-13 | End: 2021-12-16 | Stop reason: ALTCHOICE

## 2020-11-13 RX ORDER — CYANOCOBALAMIN 1000 UG/ML
1000 INJECTION INTRAMUSCULAR; SUBCUTANEOUS ONCE
Status: COMPLETED | OUTPATIENT
Start: 2020-11-13 | End: 2020-11-13

## 2020-11-13 RX ORDER — FUROSEMIDE 20 MG/1
20 TABLET ORAL DAILY
Qty: 90 TABLET | Refills: 0 | COMMUNITY
Start: 2020-11-13 | End: 2020-12-23

## 2020-11-13 RX ADMIN — CYANOCOBALAMIN 1000 MCG: 1000 INJECTION INTRAMUSCULAR; SUBCUTANEOUS at 13:51:00

## 2020-11-13 NOTE — PROGRESS NOTES
HPI:   Reina Montoya is a 69year old female who presents for a Medicare Subsequent Annual Wellness visit (Pt already had Initial Annual Wellness). Patient here for wellness.   H/o meningioma resection in 2016, HTN, HL, spinal stenosis, depress Team:  Marly Alvarado MD as PCP - General  [facility]  Freida Siegel PT as Physical Therapist    Patient Active Problem List:     HTN (hypertension)     Hyperlipidemia     Laboratory examination ordered as part of a routine gen TWICE DAILY WITH MEALS    •  ATORVASTATIN 10 MG Oral Tab, TAKE 1 TABLET(10 MG) BY MOUTH EVERY NIGHT    •  hydrocortisone 2.5 % External Cream, Apply to affected area twice daily x 7 days then hold for 7 days before reapplying.     •  LATANOPROST 0.005 % Oph no distress, appears stated age   Head:  Normocephalic, without obvious abnormality, atraumatic   Eyes:  PERRL, conjunctiva/corneas clear, EOM's intact both eyes   Ears:  Normal TM's and external ear canals, both ears   Nose: deferred   Throat: deferred (CPT=77067/72145); Future    Dense breast  -     ARCHANA CHRIS 2D+3D SCREENING BILAT (CPT=77067/82889); Future    Other iron deficiency anemia- start iron supplement, check rpt labs in march  -     CBC WITH DIFFERENTIAL WITH PLATELET;  Future  -     IRON AND TIB 07/16/2011 98          Cardiovascular Disease Screening     LDL Annually LDL Cholesterol (mg/dL)   Date Value   11/05/2020 74     LDL-CHOLESTEROL (mg/dL (calc))   Date Value   07/16/2011 168 (H)     LDL CHOLESTROL (mg/dL)   Date Value   05/17/2014 86 Clients of institutions for the mentally retarded   Persons who live in the same house as a HepB virus carrier   Homosexual men   Illicit injectable drug abusers     Tetanus Toxoid  Only covered with a cut with metal- TD and TDaP Not covered by [facility]

## 2020-11-13 NOTE — TELEPHONE ENCOUNTER
CMP, CBC, TSH w Ref, Vit B12, Iron & TIBC, Ferritin, Lipid, Hepatic function done 11/5/20    Please advise if pt is to get labs prior to OV

## 2020-11-13 NOTE — PATIENT INSTRUCTIONS
[address] SCHEDULE   Tests on this list are recommended by your physician but may not be covered, or covered at this frequency, by your insurer. Please check with your insurance carrier before scheduling to verify coverage.    PREV screening (once between ages 73-68) IPPE only No results found for this or any previous visit.  Limited to patients who meet one of the following criteria:   • Men who are 73-68 years old and have smoked more than 100 cigarettes in their lifetime   • Anyone needed after 75 Mammogram due on 11/05/2020 Please get this Mammogram regularly   Immunizations      Influenza  Covered Annually Orders placed or performed in visit on 11/13/20   • FLU VACC HIGH DOSE PRSV FREE   Orders placed or performed in visit on 11/11 [email or web address].[email or web address]/books/[email or web address].htm  A link to the [employer]. This site has a lot of good information including definitions of the different types of Advance Directives.  It also has the State forms available on it's webs

## 2020-11-26 DIAGNOSIS — E78.00 PURE HYPERCHOLESTEROLEMIA: ICD-10-CM

## 2020-11-28 RX ORDER — ATORVASTATIN CALCIUM 10 MG/1
TABLET, FILM COATED ORAL
Qty: 90 TABLET | Refills: 0 | Status: SHIPPED | OUTPATIENT
Start: 2020-11-28 | End: 2021-02-22

## 2020-12-23 DIAGNOSIS — G89.4 CHRONIC PAIN SYNDROME: ICD-10-CM

## 2020-12-23 DIAGNOSIS — I10 ESSENTIAL HYPERTENSION: ICD-10-CM

## 2020-12-23 DIAGNOSIS — R45.89 DEPRESSED MOOD: ICD-10-CM

## 2020-12-23 RX ORDER — DULOXETIN HYDROCHLORIDE 30 MG/1
CAPSULE, DELAYED RELEASE ORAL
Qty: 90 CAPSULE | Refills: 0 | Status: SHIPPED | OUTPATIENT
Start: 2020-12-23 | End: 2021-03-22

## 2020-12-23 RX ORDER — FUROSEMIDE 20 MG/1
20 TABLET ORAL DAILY
Qty: 90 TABLET | Refills: 1 | Status: SHIPPED | OUTPATIENT
Start: 2020-12-23 | End: 2021-06-21

## 2020-12-23 RX ORDER — NAPROXEN 500 MG/1
TABLET ORAL
Qty: 180 TABLET | Refills: 0 | Status: SHIPPED | OUTPATIENT
Start: 2020-12-23 | End: 2021-03-22

## 2020-12-23 NOTE — TELEPHONE ENCOUNTER
Last Ov: 11/13/20, TB, CPE  Last labs: BMP, CBC, TSH+Free T4, Vit B12, Iron & TIBC, Ferritin, Lipid, Hepatic function 11/5/20  Last Rx:   Duloxetine 30mg, #90, 0R 9/23/20  Naproxen 500mg, #180, 0R 9/23/20    No future appointments.     Per Protocol - duloxe

## 2021-02-21 DIAGNOSIS — E78.00 PURE HYPERCHOLESTEROLEMIA: ICD-10-CM

## 2021-02-22 RX ORDER — ATORVASTATIN CALCIUM 10 MG/1
TABLET, FILM COATED ORAL
Qty: 90 TABLET | Refills: 1 | Status: SHIPPED | OUTPATIENT
Start: 2021-02-22 | End: 2021-08-16

## 2021-03-09 ENCOUNTER — OFFICE VISIT (OUTPATIENT)
Dept: INTERNAL MEDICINE CLINIC | Facility: CLINIC | Age: 71
End: 2021-03-09
Payer: MEDICARE

## 2021-03-09 VITALS
BODY MASS INDEX: 34 KG/M2 | HEART RATE: 68 BPM | TEMPERATURE: 98 F | WEIGHT: 174 LBS | RESPIRATION RATE: 16 BRPM | DIASTOLIC BLOOD PRESSURE: 60 MMHG | SYSTOLIC BLOOD PRESSURE: 116 MMHG

## 2021-03-09 DIAGNOSIS — G89.29 CHRONIC PAIN OF BOTH KNEES: ICD-10-CM

## 2021-03-09 DIAGNOSIS — I83.813 VARICOSE VEINS OF BOTH LOWER EXTREMITIES WITH PAIN: ICD-10-CM

## 2021-03-09 DIAGNOSIS — M25.562 CHRONIC PAIN OF BOTH KNEES: ICD-10-CM

## 2021-03-09 DIAGNOSIS — M48.061 SPINAL STENOSIS OF LUMBAR REGION, UNSPECIFIED WHETHER NEUROGENIC CLAUDICATION PRESENT: ICD-10-CM

## 2021-03-09 DIAGNOSIS — N89.8 VAGINAL DISCHARGE: Primary | ICD-10-CM

## 2021-03-09 DIAGNOSIS — M25.561 CHRONIC PAIN OF BOTH KNEES: ICD-10-CM

## 2021-03-09 PROCEDURE — 87660 TRICHOMONAS VAGIN DIR PROBE: CPT | Performed by: INTERNAL MEDICINE

## 2021-03-09 PROCEDURE — 99214 OFFICE O/P EST MOD 30 MIN: CPT | Performed by: INTERNAL MEDICINE

## 2021-03-09 PROCEDURE — 87510 GARDNER VAG DNA DIR PROBE: CPT | Performed by: INTERNAL MEDICINE

## 2021-03-09 PROCEDURE — 87480 CANDIDA DNA DIR PROBE: CPT | Performed by: INTERNAL MEDICINE

## 2021-03-09 NOTE — PROGRESS NOTES
Nena Lee is a 69year old female.   Patient presents with:  Vaginal Problem: SN Rm 9; < 1mon, with sitting/walk notes \"wetness/spreading\", denies discharge/itching/odor, denies urinary symptoms  Pain  Varicose Veins      HPI:     Patient here weeks 6 mL 2   • CARVEDILOL 6.25 MG Oral Tab TAKE 1 TABLET(6.25 MG) BY MOUTH TWICE DAILY WITH MEALS 180 tablet 1   • hydrocortisone 2.5 % External Cream Apply to affected area twice daily x 7 days then hold for 7 days before reapplying.  28 g 3   • LATANOPR LMP  (LMP Unknown)   Breastfeeding No   BMI 33.72 kg/m²   GENERAL: well developed, well nourished,in no apparent distress  SKIN: no rashes,no suspicious lesions  HEENT: atraumatic, normocephalic  NECK: supple,no adenopathy  LUNGS: normal rate without respi

## 2021-03-10 ENCOUNTER — TELEPHONE (OUTPATIENT)
Dept: INTERNAL MEDICINE CLINIC | Facility: CLINIC | Age: 71
End: 2021-03-10

## 2021-03-10 DIAGNOSIS — N89.9 VAGINAL DISORDER: Primary | ICD-10-CM

## 2021-03-12 RX ORDER — CARVEDILOL 6.25 MG/1
TABLET ORAL
Qty: 180 TABLET | Refills: 1 | Status: SHIPPED | OUTPATIENT
Start: 2021-03-12 | End: 2021-09-02

## 2021-03-13 DIAGNOSIS — Z23 NEED FOR VACCINATION: ICD-10-CM

## 2021-03-18 ENCOUNTER — OFFICE VISIT (OUTPATIENT)
Dept: SURGERY | Facility: CLINIC | Age: 71
End: 2021-03-18
Payer: MEDICARE

## 2021-03-18 VITALS
BODY MASS INDEX: 34.16 KG/M2 | WEIGHT: 174 LBS | SYSTOLIC BLOOD PRESSURE: 115 MMHG | TEMPERATURE: 97 F | HEIGHT: 60 IN | DIASTOLIC BLOOD PRESSURE: 60 MMHG | HEART RATE: 65 BPM

## 2021-03-18 DIAGNOSIS — I83.813 VARICOSE VEINS OF BOTH LOWER EXTREMITIES WITH PAIN: Primary | ICD-10-CM

## 2021-03-18 PROCEDURE — 99204 OFFICE O/P NEW MOD 45 MIN: CPT | Performed by: SURGERY

## 2021-03-21 DIAGNOSIS — G89.4 CHRONIC PAIN SYNDROME: ICD-10-CM

## 2021-03-21 DIAGNOSIS — R45.89 DEPRESSED MOOD: ICD-10-CM

## 2021-03-22 ENCOUNTER — LAB ENCOUNTER (OUTPATIENT)
Dept: LAB | Age: 71
End: 2021-03-22
Attending: INTERNAL MEDICINE
Payer: MEDICARE

## 2021-03-22 DIAGNOSIS — E53.8 B12 DEFICIENCY: ICD-10-CM

## 2021-03-22 DIAGNOSIS — I10 ESSENTIAL HYPERTENSION: ICD-10-CM

## 2021-03-22 DIAGNOSIS — D50.8 OTHER IRON DEFICIENCY ANEMIA: ICD-10-CM

## 2021-03-22 LAB
ANION GAP SERPL CALC-SCNC: 3 MMOL/L (ref 0–18)
BASOPHILS # BLD AUTO: 0.08 X10(3) UL (ref 0–0.2)
BASOPHILS NFR BLD AUTO: 1.4 %
BUN BLD-MCNC: 22 MG/DL (ref 7–18)
BUN/CREAT SERPL: 27.5 (ref 10–20)
CALCIUM BLD-MCNC: 9.2 MG/DL (ref 8.5–10.1)
CHLORIDE SERPL-SCNC: 111 MMOL/L (ref 98–112)
CO2 SERPL-SCNC: 26 MMOL/L (ref 21–32)
CREAT BLD-MCNC: 0.8 MG/DL
DEPRECATED HBV CORE AB SER IA-ACNC: 19.1 NG/ML
DEPRECATED RDW RBC AUTO: 51 FL (ref 35.1–46.3)
EOSINOPHIL # BLD AUTO: 0.44 X10(3) UL (ref 0–0.7)
EOSINOPHIL NFR BLD AUTO: 7.7 %
ERYTHROCYTE [DISTWIDTH] IN BLOOD BY AUTOMATED COUNT: 14.6 % (ref 11–15)
GLUCOSE BLD-MCNC: 100 MG/DL (ref 70–99)
HCT VFR BLD AUTO: 36.3 %
HGB BLD-MCNC: 11.4 G/DL
IMM GRANULOCYTES # BLD AUTO: 0.02 X10(3) UL (ref 0–1)
IMM GRANULOCYTES NFR BLD: 0.4 %
IRON SATURATION: 18 %
IRON SERPL-MCNC: 86 UG/DL
LYMPHOCYTES # BLD AUTO: 2.25 X10(3) UL (ref 1–4)
LYMPHOCYTES NFR BLD AUTO: 39.5 %
MCH RBC QN AUTO: 29.9 PG (ref 26–34)
MCHC RBC AUTO-ENTMCNC: 31.4 G/DL (ref 31–37)
MCV RBC AUTO: 95.3 FL
MONOCYTES # BLD AUTO: 0.5 X10(3) UL (ref 0.1–1)
MONOCYTES NFR BLD AUTO: 8.8 %
NEUTROPHILS # BLD AUTO: 2.4 X10 (3) UL (ref 1.5–7.7)
NEUTROPHILS # BLD AUTO: 2.4 X10(3) UL (ref 1.5–7.7)
NEUTROPHILS NFR BLD AUTO: 42.2 %
OSMOLALITY SERPL CALC.SUM OF ELEC: 293 MOSM/KG (ref 275–295)
PATIENT FASTING Y/N/NP: YES
PLATELET # BLD AUTO: 161 10(3)UL (ref 150–450)
POTASSIUM SERPL-SCNC: 4.7 MMOL/L (ref 3.5–5.1)
RBC # BLD AUTO: 3.81 X10(6)UL
SODIUM SERPL-SCNC: 140 MMOL/L (ref 136–145)
TOTAL IRON BINDING CAPACITY: 478 UG/DL (ref 240–450)
TRANSFERRIN SERPL-MCNC: 321 MG/DL (ref 200–360)
VIT B12 SERPL-MCNC: 1264 PG/ML (ref 193–986)
WBC # BLD AUTO: 5.7 X10(3) UL (ref 4–11)

## 2021-03-22 PROCEDURE — 82728 ASSAY OF FERRITIN: CPT

## 2021-03-22 PROCEDURE — 83550 IRON BINDING TEST: CPT

## 2021-03-22 PROCEDURE — 83540 ASSAY OF IRON: CPT

## 2021-03-22 PROCEDURE — 36415 COLL VENOUS BLD VENIPUNCTURE: CPT

## 2021-03-22 PROCEDURE — 80048 BASIC METABOLIC PNL TOTAL CA: CPT

## 2021-03-22 PROCEDURE — 82607 VITAMIN B-12: CPT

## 2021-03-22 PROCEDURE — 85025 COMPLETE CBC W/AUTO DIFF WBC: CPT

## 2021-03-22 RX ORDER — DULOXETIN HYDROCHLORIDE 30 MG/1
CAPSULE, DELAYED RELEASE ORAL
Qty: 90 CAPSULE | Refills: 0 | Status: SHIPPED | OUTPATIENT
Start: 2021-03-22 | End: 2021-06-21

## 2021-03-22 RX ORDER — NAPROXEN 500 MG/1
TABLET ORAL
Qty: 180 TABLET | Refills: 0 | Status: SHIPPED | OUTPATIENT
Start: 2021-03-22 | End: 2021-06-21

## 2021-03-22 NOTE — TELEPHONE ENCOUNTER
Last VISIT 3/9/21 TB Vaginal Discharge, 11/13/20 TB CPE    Last REFILL 12/23/20 Duloxetine 90T 0R, Naproxen 180T 0R    Last LABS 11/05/20 LFT, Lipid,Ferritin, Iron TIBC, Vitamin B12, TSH, CBC, CMP    Future Appointments   Date Time Provider Department Cent

## 2021-03-24 ENCOUNTER — TELEPHONE (OUTPATIENT)
Dept: INTERNAL MEDICINE CLINIC | Facility: CLINIC | Age: 71
End: 2021-03-24

## 2021-03-24 NOTE — TELEPHONE ENCOUNTER
Message from [employer] states Ciclopirox 8% requires PA    FWD to TB, please advise. If proceed, information as follows. .  [id number]  ID# [id number]

## 2021-03-30 NOTE — TELEPHONE ENCOUNTER
Please let pt know topical for her nails not covered.  I would rather not put her on any oral meds due to side effects of oral antifungals

## 2021-03-30 NOTE — TELEPHONE ENCOUNTER
Message from [employer] shows plan alternative as follows. .  Terbinafine 250mg, Itraconazole 100mg    FWD to TB, please advise    No response from ins recieved

## 2021-03-31 NOTE — TELEPHONE ENCOUNTER
Spoke with Glenn, states the pharmacy called her and noted Rx was approved. Pt states if this was an error, she will use an OTC medication.

## 2021-04-01 ENCOUNTER — HOSPITAL ENCOUNTER (OUTPATIENT)
Dept: ULTRASOUND IMAGING | Age: 71
Discharge: HOME | End: 2021-04-01
Attending: SURGERY
Payer: MEDICARE

## 2021-04-01 DIAGNOSIS — I83.813 VARICOSE VEINS OF BOTH LOWER EXTREMITIES WITH PAIN: ICD-10-CM

## 2021-04-01 PROCEDURE — 93970 EXTREMITY STUDY: CPT | Performed by: SURGERY

## 2021-04-06 ENCOUNTER — OFFICE VISIT (OUTPATIENT)
Dept: SURGERY | Facility: CLINIC | Age: 71
End: 2021-04-06
Payer: MEDICARE

## 2021-04-06 VITALS — DIASTOLIC BLOOD PRESSURE: 71 MMHG | HEART RATE: 62 BPM | TEMPERATURE: 97 F | SYSTOLIC BLOOD PRESSURE: 136 MMHG

## 2021-04-06 DIAGNOSIS — I83.899 VARICOSE VEINS WITH COMPLICATIONS: ICD-10-CM

## 2021-04-06 DIAGNOSIS — I83.813 VARICOSE VEINS OF BOTH LOWER EXTREMITIES WITH PAIN: Primary | ICD-10-CM

## 2021-04-06 PROCEDURE — 99215 OFFICE O/P EST HI 40 MIN: CPT | Performed by: SURGERY

## 2021-04-07 NOTE — H&P
New Patient Visit Note       Active Problems      1. Varicose veins of both lower extremities with pain        Chief Complaint   Patient presents with:  Varicose Veins: NP ref PCP bilat varicose veins- PT states has hx veins.  PT states has pain and swellin days then hold for 7 days before reapplying., Disp: 28 g, Rfl: 3  •  LATANOPROST 0.005 % Ophthalmic Solution, INSTILL 1 DROP IN BOTH EYES EVERY NIGHT, Disp: 7.5 mL, Rfl: 0  •  Albuterol Sulfate  (90 Base) MCG/ACT Inhalation Aero Soln, Inhale 1 puff (LMP Unknown)   BMI 33.98 kg/m²   Physical Exam  Constitutional:       Appearance: She is well-developed. HENT:      Head: Normocephalic and atraumatic. Eyes:      Pupils: Pupils are equal, round, and reactive to light.    Cardiovascular:      Rate and are progressive and worse by the end of the day. They prohibit her from prolonged standing. She has noted that her symptoms have been progressive over the past few years.         Assessment       The patient states that the symptoms have been progressive

## 2021-04-07 NOTE — PROGRESS NOTES
Follow Up Visit Note       Active Problems      1. Varicose veins of both lower extremities with pain    2.  Varicose veins with complications          Chief Complaint   Patient presents with:  Varicose Veins: venous US done on 4/1      Allergies  Dalsy is nailpolish remover end of week and repeat process for up to 48 weeks, Disp: 6 mL, Rfl: 2  •  hydrocortisone 2.5 % External Cream, Apply to affected area twice daily x 7 days then hold for 7 days before reapplying., Disp: 28 g, Rfl: 3  •  LATANOPROST 0.005 Normocephalic and atraumatic. Eyes:      Pupils: Pupils are equal, round, and reactive to light. Cardiovascular:      Rate and Rhythm: Normal rate and regular rhythm.    Pulmonary:      Effort: Pulmonary effort is normal.      Breath sounds: Normal danya proximal thigh, mild reflux in the mid thigh, mild reflux in the distal thigh, moderate reflux at the knee, mild reflux in the proximal calf, no reflux in the mid calf and no reflux in the distal calf.   Lesser saphenous vein at the junction mild reflux on and postoperative course was also reviewed. All questions from the patient were answered in detail. The patient did verbalize understanding and does not have any further questions at this time.   The patient does wish to proceed with the proposed procedur

## 2021-05-13 ENCOUNTER — TELEPHONE (OUTPATIENT)
Dept: SURGERY | Facility: CLINIC | Age: 71
End: 2021-05-13

## 2021-05-13 NOTE — TELEPHONE ENCOUNTER
Patient reached out through My Chart and wanted to schedule her varicose vein procedures bilaterally.   I called patient and scheduled her for 6-9-2021 and 6- with Dr. Noe Viveros at our [facility]. location  Answered her questions and informed her to cont

## 2021-05-25 ENCOUNTER — OFFICE VISIT (OUTPATIENT)
Dept: OBGYN CLINIC | Facility: CLINIC | Age: 71
End: 2021-05-25
Payer: MEDICARE

## 2021-05-25 VITALS
WEIGHT: 176 LBS | DIASTOLIC BLOOD PRESSURE: 72 MMHG | BODY MASS INDEX: 32.39 KG/M2 | HEART RATE: 67 BPM | SYSTOLIC BLOOD PRESSURE: 142 MMHG | HEIGHT: 62 IN

## 2021-05-25 DIAGNOSIS — N81.10 PELVIC ORGAN PROLAPSE QUANTIFICATION STAGE 1 CYSTOCELE: Primary | ICD-10-CM

## 2021-05-25 DIAGNOSIS — N95.2 VAGINAL ATROPHY: ICD-10-CM

## 2021-05-25 PROCEDURE — 99204 OFFICE O/P NEW MOD 45 MIN: CPT | Performed by: OBSTETRICS & GYNECOLOGY

## 2021-05-25 RX ORDER — ESTRADIOL 0.1 MG/G
1 CREAM VAGINAL DAILY
Qty: 42.5 G | Refills: 0 | Status: SHIPPED | OUTPATIENT
Start: 2021-05-25

## 2021-05-25 NOTE — PROGRESS NOTES
[facility]  Obstetrics and Gynecology Referral  History & Physical    CC: Patient is a new patient and referred for pelvic organ prolapse    Subjective:     HPI: Nena Lee is a 66year old  female referred for pelvic organ prol MG) BY MOUTH TWICE DAILY WITH MEALS, Disp: 180 tablet, Rfl: 1  ATORVASTATIN 10 MG Oral Tab, TAKE 1 TABLET(10 MG) BY MOUTH EVERY NIGHT, Disp: 90 tablet, Rfl: 1  furosemide 20 MG Oral Tab, Take 1 tablet (20 mg total) by mouth daily. , Disp: 90 tablet, Rfl: 1 Date   • ANGIOGRAM     • BIOPSY OF THYROID,PERCUT  12/15/2011    fine needle aspiration-right thyroid nodule-hyperplastic nodule   • CATARACT     • HYSTERECTOMY      age 44       Social History:  Social History    Socioeconomic History      Marital status: Gatherings with Friends and Family:       Attends [employer] Services:       Active Member of Clubs or Organizations:       Attends Club or Organization Meetings:       Marital Status:   Intimate Partner Violence:       Fear of Current or Ex-Partner:       pelvic organ prolapse   - no bowel or urinary issues   - grade 1 cystocele noted   - vaginal atrophy noted  - d/w patient management options including vaginal estrogen, pelvic floor PT and referral to urogynecology for pessary/surgicla management consu

## 2021-05-28 PROBLEM — N95.2 VAGINAL ATROPHY: Status: ACTIVE | Noted: 2021-05-28

## 2021-05-28 PROBLEM — N81.10 PELVIC ORGAN PROLAPSE QUANTIFICATION STAGE 1 CYSTOCELE: Status: ACTIVE | Noted: 2021-05-28

## 2021-06-07 ENCOUNTER — TELEPHONE (OUTPATIENT)
Dept: SURGERY | Facility: CLINIC | Age: 71
End: 2021-06-07

## 2021-06-07 NOTE — TELEPHONE ENCOUNTER
Pt states is feeling unwell. Pt c/o of sore throat, cough. Per spouse covid-19 test was negative. They would like to cancel vein ablation at this time.

## 2021-06-19 DIAGNOSIS — I10 ESSENTIAL HYPERTENSION: ICD-10-CM

## 2021-06-19 DIAGNOSIS — R45.89 DEPRESSED MOOD: ICD-10-CM

## 2021-06-19 DIAGNOSIS — G89.4 CHRONIC PAIN SYNDROME: ICD-10-CM

## 2021-06-21 RX ORDER — DULOXETIN HYDROCHLORIDE 30 MG/1
CAPSULE, DELAYED RELEASE ORAL
Qty: 90 CAPSULE | Refills: 0 | Status: SHIPPED | OUTPATIENT
Start: 2021-06-21 | End: 2021-07-09

## 2021-06-21 RX ORDER — NAPROXEN 500 MG/1
TABLET ORAL
Qty: 180 TABLET | Refills: 0 | Status: SHIPPED | OUTPATIENT
Start: 2021-06-21 | End: 2021-07-09

## 2021-06-21 RX ORDER — FUROSEMIDE 20 MG/1
TABLET ORAL
Qty: 90 TABLET | Refills: 1 | Status: SHIPPED | OUTPATIENT
Start: 2021-06-21

## 2021-06-21 NOTE — TELEPHONE ENCOUNTER
Last Ov: 3/9/21, TB, F/U  Last labs: CBC, Iron & TIBC, Vit B12, Ferritin, BMP 3/22/21  Last Rx:   Duloxetine 30mg, #90, 0R 3/22/21  Naproxen 500mg, #180, 0R 3/22/21    Future Appointments   Date Time Provider Carter Huang   7/14/2021  8:30 AM MOOK Valentino

## 2021-06-27 ENCOUNTER — HOSPITAL ENCOUNTER (OUTPATIENT)
Age: 71
Discharge: HOME | End: 2021-06-27
Payer: MEDICARE

## 2021-06-27 ENCOUNTER — APPOINTMENT (OUTPATIENT)
Dept: GENERAL RADIOLOGY | Age: 71
End: 2021-06-27
Attending: NURSE PRACTITIONER
Payer: MEDICARE

## 2021-06-27 VITALS
HEART RATE: 63 BPM | SYSTOLIC BLOOD PRESSURE: 115 MMHG | HEIGHT: 62 IN | TEMPERATURE: 98 F | WEIGHT: 173 LBS | BODY MASS INDEX: 31.83 KG/M2 | RESPIRATION RATE: 16 BRPM | DIASTOLIC BLOOD PRESSURE: 55 MMHG | OXYGEN SATURATION: 97 %

## 2021-06-27 DIAGNOSIS — J06.9 UPPER RESPIRATORY TRACT INFECTION, UNSPECIFIED TYPE: Primary | ICD-10-CM

## 2021-06-27 DIAGNOSIS — J40 BRONCHITIS: ICD-10-CM

## 2021-06-27 PROCEDURE — 99213 OFFICE O/P EST LOW 20 MIN: CPT

## 2021-06-27 PROCEDURE — 71046 X-RAY EXAM CHEST 2 VIEWS: CPT | Performed by: NURSE PRACTITIONER

## 2021-06-27 PROCEDURE — 94664 DEMO&/EVAL PT USE INHALER: CPT

## 2021-06-27 RX ORDER — ALBUTEROL SULFATE 90 UG/1
2 AEROSOL, METERED RESPIRATORY (INHALATION) EVERY 4 HOURS PRN
Qty: 1 EACH | Refills: 0 | Status: SHIPPED | OUTPATIENT
Start: 2021-06-27 | End: 2021-07-22

## 2021-06-27 RX ORDER — PREDNISONE 20 MG/1
20 TABLET ORAL DAILY
Qty: 5 TABLET | Refills: 0 | Status: SHIPPED | OUTPATIENT
Start: 2021-06-27 | End: 2021-07-02

## 2021-06-27 RX ORDER — BENZONATATE 100 MG/1
100 CAPSULE ORAL 3 TIMES DAILY PRN
Qty: 30 CAPSULE | Refills: 0 | Status: SHIPPED | OUTPATIENT
Start: 2021-06-27 | End: 2021-07-27

## 2021-06-27 NOTE — ED PROVIDER NOTES
Patient Seen in: [facility]      History   Patient presents with:  Cough/URI    Stated Complaint: Cough     HPI/Subjective:   HPI  Patient is 59-year-old female past medical history of GERD, meningioma brain, hyperlipidemia, hypertension, Procedure Laterality Date   • ANGIOGRAM     • BIOPSY OF THYROID,PERCUT  12/15/2011    fine needle aspiration-right thyroid nodule-hyperplastic nodule   • CATARACT     • HYSTERECTOMY      age 44                Social History    Tobacco Use      Smoking st Left eye: No discharge. Conjunctiva/sclera: Conjunctivae normal.   Cardiovascular:      Rate and Rhythm: Normal rate and regular rhythm. Pulses: Normal pulses. Heart sounds: Normal heart sounds. No murmur heard. No friction rub. No gallop. (CST): Barron Garay MD on 6/27/2021 at 1:02 PM     Finalized by (CST): Barron Garay MD on 6/27/2021 at 1:04 PM         MDM             CXR-no active cardiopulmonary process identified. I also reviewed chest x-ray and agree with this finding.

## 2021-06-27 NOTE — ED INITIAL ASSESSMENT (HPI)
Patient reports she has a cough with yellow phlegm. Patient reports she had the same symptoms 2 weeks ago, but had gotten better. Patient reports she had a covid test done at that time and it was negative.

## 2021-07-03 DIAGNOSIS — N95.2 VAGINAL ATROPHY: ICD-10-CM

## 2021-07-06 RX ORDER — ESTRADIOL 0.1 MG/G
CREAM VAGINAL
Qty: 42.5 G | Refills: 0 | OUTPATIENT
Start: 2021-07-06

## 2021-07-08 DIAGNOSIS — R45.89 DEPRESSED MOOD: ICD-10-CM

## 2021-07-08 DIAGNOSIS — G89.4 CHRONIC PAIN SYNDROME: ICD-10-CM

## 2021-07-08 RX ORDER — DULOXETIN HYDROCHLORIDE 30 MG/1
CAPSULE, DELAYED RELEASE ORAL
Qty: 90 CAPSULE | Refills: 0 | Status: CANCELLED | OUTPATIENT
Start: 2021-07-08

## 2021-07-09 RX ORDER — NAPROXEN 500 MG/1
TABLET ORAL
Qty: 180 TABLET | Refills: 0 | Status: SHIPPED | OUTPATIENT
Start: 2021-07-09 | End: 2021-10-08

## 2021-07-09 RX ORDER — DULOXETIN HYDROCHLORIDE 30 MG/1
CAPSULE, DELAYED RELEASE ORAL
Qty: 90 CAPSULE | Refills: 0 | Status: SHIPPED | OUTPATIENT
Start: 2021-07-09 | End: 2021-08-26

## 2021-07-09 NOTE — TELEPHONE ENCOUNTER
Last Ov: 3/9/21, TB, acute  Last labs: CBC, Iron & TIBC, Vit B12, Ferritin, BMP 3/22/21  Last Rx:   Duloxetine 30mg, #90, 0R 6/21/21  Naproxen 500mg, #180, 0R 6/21/21    Future Appointments   Date Time Provider Carter Huang   7/14/2021  8:30 AM Loyd Hernandez

## 2021-07-14 ENCOUNTER — OFFICE VISIT (OUTPATIENT)
Dept: SURGERY | Facility: CLINIC | Age: 71
End: 2021-07-14
Payer: MEDICARE

## 2021-07-14 VITALS
TEMPERATURE: 97 F | WEIGHT: 173 LBS | HEIGHT: 62 IN | BODY MASS INDEX: 31.83 KG/M2 | DIASTOLIC BLOOD PRESSURE: 80 MMHG | HEART RATE: 65 BPM | SYSTOLIC BLOOD PRESSURE: 149 MMHG

## 2021-07-14 DIAGNOSIS — I83.899 VARICOSE VEINS WITH COMPLICATIONS: ICD-10-CM

## 2021-07-14 DIAGNOSIS — I83.813 VARICOSE VEINS OF BILATERAL LOWER EXTREMITIES WITH PAIN: Primary | ICD-10-CM

## 2021-07-14 PROCEDURE — 36482 ENDOVEN THER CHEM ADHES 1ST: CPT | Performed by: SURGERY

## 2021-07-22 ENCOUNTER — OFFICE VISIT (OUTPATIENT)
Dept: OBGYN CLINIC | Facility: CLINIC | Age: 71
End: 2021-07-22
Payer: MEDICARE

## 2021-07-22 VITALS
HEART RATE: 68 BPM | HEIGHT: 59.4 IN | WEIGHT: 175.81 LBS | DIASTOLIC BLOOD PRESSURE: 60 MMHG | SYSTOLIC BLOOD PRESSURE: 114 MMHG | BODY MASS INDEX: 34.98 KG/M2

## 2021-07-22 DIAGNOSIS — N81.10 VAGINAL PROLAPSE WITHOUT UTERINE PROLAPSE: Primary | ICD-10-CM

## 2021-07-22 DIAGNOSIS — N95.2 VAGINAL ATROPHY: ICD-10-CM

## 2021-07-22 PROCEDURE — 99214 OFFICE O/P EST MOD 30 MIN: CPT | Performed by: OBSTETRICS & GYNECOLOGY

## 2021-07-22 NOTE — PROGRESS NOTES
[facility]  Obstetrics and Gynecology  Follow Up Progress Note    Subjective:     Melecio Early is a 66year old  female who was last seen in office 21 and presents with c/o vaginal atrophy and pelvic organ prolapse.  The shaka nail qhs, remove with nailpolish remover end of week and repeat process for up to 48 weeks, Disp: 6 mL, Rfl: 2  hydrocortisone 2.5 % External Cream, Apply to affected area twice daily x 7 days then hold for 7 days before reapplying., Disp: 28 g, Rfl: 3  LA noted.   CERVIX: surgically absent  UTERUS: surgically absent, vaginal cuff well healed  ADNEXA: normal adnexa in size, nontender and no masses  Ext: non-tender, no edema      Assessment:     Chris العراقي is a 66year old  female who presen

## 2021-07-22 NOTE — PATIENT INSTRUCTIONS
Please return in 1 year for your annual gyne visit or sooner if having abnormal vaginal bleeding or severe pelvic pain

## 2021-07-26 PROBLEM — N81.10 VAGINAL PROLAPSE WITHOUT UTERINE PROLAPSE: Status: ACTIVE | Noted: 2021-07-26

## 2021-07-28 ENCOUNTER — OFFICE VISIT (OUTPATIENT)
Dept: SURGERY | Facility: CLINIC | Age: 71
End: 2021-07-28
Payer: MEDICARE

## 2021-07-28 VITALS
BODY MASS INDEX: 34.36 KG/M2 | DIASTOLIC BLOOD PRESSURE: 75 MMHG | HEART RATE: 62 BPM | WEIGHT: 175 LBS | SYSTOLIC BLOOD PRESSURE: 173 MMHG | TEMPERATURE: 97 F | HEIGHT: 60 IN

## 2021-07-28 DIAGNOSIS — I83.813 VARICOSE VEINS OF BILATERAL LOWER EXTREMITIES WITH PAIN: ICD-10-CM

## 2021-07-28 DIAGNOSIS — I83.813 VARICOSE VEINS OF BOTH LOWER EXTREMITIES WITH PAIN: Primary | ICD-10-CM

## 2021-07-28 DIAGNOSIS — I87.2 SAPHENOFEMORAL VENOUS REFLUX: ICD-10-CM

## 2021-07-28 DIAGNOSIS — I83.899 VARICOSE VEINS WITH COMPLICATIONS: ICD-10-CM

## 2021-07-28 PROCEDURE — 36482 ENDOVEN THER CHEM ADHES 1ST: CPT | Performed by: SURGERY

## 2021-08-03 ENCOUNTER — TELEPHONE (OUTPATIENT)
Dept: INTERNAL MEDICINE CLINIC | Facility: CLINIC | Age: 71
End: 2021-08-03

## 2021-08-03 DIAGNOSIS — Z87.898 H/O BRAIN TUMOR: Primary | ICD-10-CM

## 2021-08-03 DIAGNOSIS — R42 DIZZINESS: ICD-10-CM

## 2021-08-03 NOTE — TELEPHONE ENCOUNTER
Patient notifeid TB order the MRI Brain for pt and to call [facility] to schedule. Pt verbalizes understanding.

## 2021-08-03 NOTE — TELEPHONE ENCOUNTER
Patient calling with Dizziness for the last 2 days, had vein procedure prior to dizzy spell where she felt week and was not able to get up from chair or toilet and needed assistance. Patient has history of brain tumor. Pt would like MRI.

## 2021-08-03 NOTE — TELEPHONE ENCOUNTER
Patient states she had a varicose vein procedure with Dr Sha Vazquez, for two days following the procedure she was a bit dizzy and weak, however her children had her to drink more water and states her s/s were resolved after the second dayPatient states she was

## 2021-08-09 ENCOUNTER — TELEPHONE (OUTPATIENT)
Dept: INTERNAL MEDICINE CLINIC | Facility: CLINIC | Age: 71
End: 2021-08-09

## 2021-08-09 RX ORDER — ALPRAZOLAM 0.25 MG/1
TABLET ORAL
Qty: 2 TABLET | Refills: 0 | Status: SHIPPED | OUTPATIENT
Start: 2021-08-09 | End: 2021-12-16 | Stop reason: ALTCHOICE

## 2021-08-09 NOTE — TELEPHONE ENCOUNTER
Pt notified that RX sent to the pharmacy - advised this medication will make her drowsy and to be sure to have someone drive her to the MRI appt. Pt states understanding and agrees to plan.

## 2021-08-09 NOTE — TELEPHONE ENCOUNTER
Patient is having a MRI on Friday and would like some medication to calm her down for the procedure. This would be a one time thing. She was prescribed this in 2018 for the same thing.       Please advise  Pharmacy is correct

## 2021-08-13 ENCOUNTER — HOSPITAL ENCOUNTER (OUTPATIENT)
Dept: MRI IMAGING | Age: 71
Discharge: HOME | End: 2021-08-13
Attending: INTERNAL MEDICINE
Payer: MEDICARE

## 2021-08-13 DIAGNOSIS — R42 DIZZINESS: ICD-10-CM

## 2021-08-13 DIAGNOSIS — Z87.898 H/O BRAIN TUMOR: ICD-10-CM

## 2021-08-13 PROCEDURE — 70553 MRI BRAIN STEM W/O & W/DYE: CPT | Performed by: INTERNAL MEDICINE

## 2021-08-13 PROCEDURE — A9575 INJ GADOTERATE MEGLUMI 0.1ML: HCPCS | Performed by: INTERNAL MEDICINE

## 2021-08-14 DIAGNOSIS — E78.00 PURE HYPERCHOLESTEROLEMIA: ICD-10-CM

## 2021-08-16 ENCOUNTER — OFFICE VISIT (OUTPATIENT)
Dept: SURGERY | Facility: CLINIC | Age: 71
End: 2021-08-16
Payer: MEDICARE

## 2021-08-16 DIAGNOSIS — I83.813 VARICOSE VEINS OF BILATERAL LOWER EXTREMITIES WITH PAIN: ICD-10-CM

## 2021-08-16 DIAGNOSIS — I83.899 VARICOSE VEINS WITH COMPLICATIONS: ICD-10-CM

## 2021-08-16 DIAGNOSIS — I83.813 VARICOSE VEINS OF BOTH LOWER EXTREMITIES WITH PAIN: Primary | ICD-10-CM

## 2021-08-16 DIAGNOSIS — I87.2 SAPHENOFEMORAL VENOUS REFLUX: ICD-10-CM

## 2021-08-16 PROCEDURE — 99214 OFFICE O/P EST MOD 30 MIN: CPT | Performed by: SURGERY

## 2021-08-16 RX ORDER — ATORVASTATIN CALCIUM 10 MG/1
TABLET, FILM COATED ORAL
Qty: 90 TABLET | Refills: 1 | Status: SHIPPED | OUTPATIENT
Start: 2021-08-16

## 2021-08-17 DIAGNOSIS — B37.2 CANDIDA INFECTION OF FLEXURAL SKIN: ICD-10-CM

## 2021-08-17 NOTE — PROGRESS NOTES
Follow Up Visit Note       Active Problems      1. Varicose veins of both lower extremities with pain    2. Varicose veins of bilateral lower extremities with pain    3. Varicose veins with complications    4.  Saphenofemoral venous reflux          Chief Co 1  •  Estradiol 0.1 MG/GM Vaginal Cream, Place 1 g vaginally daily. , Disp: 42.5 g, Rfl: 0  •  CARVEDILOL 6.25 MG Oral Tab, TAKE 1 TABLET(6.25 MG) BY MOUTH TWICE DAILY WITH MEALS, Disp: 180 tablet, Rfl: 1  •  Ferrous Sulfate 325 (65 Fe) MG Oral Tab, Take 1 Psychiatric/Behavioral: Negative for confusion, hallucinations and sleep disturbance. Physical Findings   LMP  (LMP Unknown)   Physical Exam  Constitutional:       Appearance: She is well-developed. HENT:      Head: Normocephalic and atraumatic. regimen as well as leg elevation when possible to prevent recurrence of varicose vein symptoms    Assessment   Varicose veins of both lower extremities with pain  (primary encounter diagnosis)  Varicose veins of bilateral lower extremities with pain  Varic

## 2021-08-20 ENCOUNTER — HOSPITAL ENCOUNTER (OUTPATIENT)
Dept: MAMMOGRAPHY | Age: 71
Discharge: HOME | End: 2021-08-20
Attending: INTERNAL MEDICINE
Payer: MEDICARE

## 2021-08-20 DIAGNOSIS — Z12.31 ENCOUNTER FOR SCREENING MAMMOGRAM FOR MALIGNANT NEOPLASM OF BREAST: ICD-10-CM

## 2021-08-20 DIAGNOSIS — R92.2 DENSE BREAST: ICD-10-CM

## 2021-08-20 PROCEDURE — 77067 SCR MAMMO BI INCL CAD: CPT | Performed by: INTERNAL MEDICINE

## 2021-08-20 PROCEDURE — 77063 BREAST TOMOSYNTHESIS BI: CPT | Performed by: INTERNAL MEDICINE

## 2021-08-25 DIAGNOSIS — R45.89 DEPRESSED MOOD: ICD-10-CM

## 2021-08-25 DIAGNOSIS — G89.4 CHRONIC PAIN SYNDROME: ICD-10-CM

## 2021-08-26 RX ORDER — DULOXETIN HYDROCHLORIDE 30 MG/1
CAPSULE, DELAYED RELEASE ORAL
Qty: 90 CAPSULE | Refills: 0 | Status: SHIPPED | OUTPATIENT
Start: 2021-08-26

## 2021-08-26 NOTE — TELEPHONE ENCOUNTER
Been Following TB  Last OV 3/9/21  Last CPE 11/13/20  Last Labs CBC, Iron * TIBC, Vit B12, Ferritin, BMP 3/22/21    Last Rx fill Duloxetine 30mg #90 0R 7/9/21    No future appointments. Per PROTOCOL not on protocol, pending. Please Approve or Deny.

## 2021-08-27 NOTE — PROCEDURES
Patient    Date of procedure:   July 14, 2021    Preoperative diagnosis:   right symptomatic greater saphenous vein insufficiency    Postoperative diagnosis:   Same    Procedure: Endovenous ablation of the rightgreater saphenous vein with VenaSeal closure within the lumen of the vein was confirmed sonographically. Again using local anesthesia, a small incision was made at the entrance point in the skin.   The dilator and sheath were then passed over the guidewire again using Seldinger technique and ultrasou 35cm using a total of 2.0 cyanoacrylate adhesive. The patient tolerated the procedure well. The patient did not experience any immediate adverse reactions. Steri-Strips were placed across the skin incision.   A sterile dressing was subsequently tobi

## 2021-08-27 NOTE — PROCEDURES
Operative Note:    Date of Procedure:    July 28, 2021    Preoperative diagnosis: Venous insufficiency, varicose veins, abnormal greater saphenous vein.     Postoperative diagnosis: Same    Procedure: Endovenous radiofrequency ablation of the left greater s most distal access point of the greater saphenous vein mid thigh, an area was anesthetized with 1% lidocaine. Using the Seldinger technique, a guidewire was placed into the greater saphenous vein with ultrasound guidance.  After the wire was in place, the n was closed with a Steri-Strip. A compression wrap was applied to the lower extremity from the level of the foot to the proximal thigh. This patient had 4 cycles. Total time of treatment was 1 minutes, and 20 seconds.     The patient did tolerate the p

## 2021-08-31 ENCOUNTER — TELEPHONE (OUTPATIENT)
Dept: INTERNAL MEDICINE CLINIC | Facility: CLINIC | Age: 71
End: 2021-08-31

## 2021-08-31 DIAGNOSIS — I10 ESSENTIAL HYPERTENSION: ICD-10-CM

## 2021-08-31 DIAGNOSIS — E78.00 PURE HYPERCHOLESTEROLEMIA: ICD-10-CM

## 2021-08-31 DIAGNOSIS — Z00.00 ROUTINE GENERAL MEDICAL EXAMINATION AT A HEALTH CARE FACILITY: Primary | ICD-10-CM

## 2021-08-31 NOTE — TELEPHONE ENCOUNTER
Future Appointments   Date Time Provider Carter Reina   11/15/2021  8:45 AM REFERENCE [id number]   11/22/2021  1:00 PM Kristie Francisco MD [facility]     Orders to edward- Pt informed that labs need to be completed no sooner

## 2021-09-02 RX ORDER — CARVEDILOL 6.25 MG/1
TABLET ORAL
Qty: 180 TABLET | Refills: 1 | Status: SHIPPED | OUTPATIENT
Start: 2021-09-02

## 2021-09-09 ENCOUNTER — MED REC SCAN ONLY (OUTPATIENT)
Dept: SURGERY | Facility: CLINIC | Age: 71
End: 2021-09-09

## 2021-10-08 RX ORDER — NAPROXEN 500 MG/1
TABLET ORAL
Qty: 180 TABLET | Refills: 0 | Status: SHIPPED | OUTPATIENT
Start: 2021-10-08 | End: 2022-01-04

## 2021-11-17 ENCOUNTER — LAB ENCOUNTER (OUTPATIENT)
Dept: LAB | Age: 71
End: 2021-11-17
Attending: INTERNAL MEDICINE
Payer: MEDICARE

## 2021-11-17 DIAGNOSIS — E78.00 PURE HYPERCHOLESTEROLEMIA: ICD-10-CM

## 2021-11-17 DIAGNOSIS — I10 ESSENTIAL HYPERTENSION: ICD-10-CM

## 2021-11-17 DIAGNOSIS — Z00.00 ROUTINE GENERAL MEDICAL EXAMINATION AT A HEALTH CARE FACILITY: ICD-10-CM

## 2021-11-17 PROCEDURE — 80053 COMPREHEN METABOLIC PANEL: CPT

## 2021-11-17 PROCEDURE — 85025 COMPLETE CBC W/AUTO DIFF WBC: CPT

## 2021-11-17 PROCEDURE — 36415 COLL VENOUS BLD VENIPUNCTURE: CPT

## 2021-11-17 PROCEDURE — 84443 ASSAY THYROID STIM HORMONE: CPT

## 2021-11-17 PROCEDURE — 80061 LIPID PANEL: CPT

## 2021-12-15 ENCOUNTER — OFFICE VISIT (OUTPATIENT)
Dept: PHYSICAL THERAPY | Facility: HOSPITAL | Age: 71
End: 2021-12-15
Attending: OBSTETRICS & GYNECOLOGY
Payer: MEDICARE

## 2021-12-15 DIAGNOSIS — N81.10 VAGINAL PROLAPSE WITHOUT UTERINE PROLAPSE: ICD-10-CM

## 2021-12-15 PROCEDURE — 97163 PT EVAL HIGH COMPLEX 45 MIN: CPT

## 2021-12-15 PROCEDURE — 97110 THERAPEUTIC EXERCISES: CPT

## 2021-12-15 PROCEDURE — 97112 NEUROMUSCULAR REEDUCATION: CPT

## 2021-12-15 NOTE — PROGRESS NOTES
MUSCULOSKELETAL AND PELVIC FLOOR EVALUATION:   Referring Physician: Dr. Becky Cintron  Diagnosis: Vaginal prolapse without uterine prolapse (N81.10)          Date of Service: 12/15/2021     PATIENT SUMMARY   Daniel Ramírez is a 66year old female  who p variable  Amount: variable  Pad use: pantyliner for pelvic floor support  Nocturia: 1x  Fluid Intake: water  Post void dribble: no  Hovering: yes  Empty bladder just in case: yes  Do you ever leak urine without knowing it? no    BOWEL HABITS  Types of symp WNL JOHN LE except hamstrings -20 B 90/90    Informed consent for internal pelvic evaluation given: Yes    External Observation:   Voluntary contraction: present   Voluntary relaxation: present  Involuntary contraction: absent  Involuntary relaxation: prese involved High Complexity decision making due to 3+ personal factors/comorbidities, 4+ body structures involved/activity limitations, and unstable symptoms including pelvic organ prolapse and urgency, back/sciatic pain, neurogenic claudication  PLAN OF CARE Date____________________    Certification From: 73/27/4478  To:3/15/2022

## 2021-12-16 ENCOUNTER — OFFICE VISIT (OUTPATIENT)
Dept: INTERNAL MEDICINE CLINIC | Facility: CLINIC | Age: 71
End: 2021-12-16
Payer: MEDICARE

## 2021-12-16 VITALS
TEMPERATURE: 98 F | BODY MASS INDEX: 33.47 KG/M2 | RESPIRATION RATE: 16 BRPM | WEIGHT: 170.5 LBS | HEIGHT: 60 IN | SYSTOLIC BLOOD PRESSURE: 130 MMHG | OXYGEN SATURATION: 99 % | HEART RATE: 57 BPM | DIASTOLIC BLOOD PRESSURE: 80 MMHG

## 2021-12-16 DIAGNOSIS — Z00.00 ENCOUNTER FOR ANNUAL HEALTH EXAMINATION: Primary | ICD-10-CM

## 2021-12-16 DIAGNOSIS — R45.89 DEPRESSED MOOD: ICD-10-CM

## 2021-12-16 DIAGNOSIS — Z98.890 STATUS POST RESECTION OF MENINGIOMA: ICD-10-CM

## 2021-12-16 DIAGNOSIS — E78.00 PURE HYPERCHOLESTEROLEMIA: ICD-10-CM

## 2021-12-16 DIAGNOSIS — R05.8 DRY COUGH: ICD-10-CM

## 2021-12-16 DIAGNOSIS — I10 PRIMARY HYPERTENSION: ICD-10-CM

## 2021-12-16 DIAGNOSIS — M48.062 SPINAL STENOSIS OF LUMBAR REGION WITH NEUROGENIC CLAUDICATION: ICD-10-CM

## 2021-12-16 DIAGNOSIS — N81.10 PELVIC ORGAN PROLAPSE QUANTIFICATION STAGE 1 CYSTOCELE: ICD-10-CM

## 2021-12-16 DIAGNOSIS — Z86.018 STATUS POST RESECTION OF MENINGIOMA: ICD-10-CM

## 2021-12-16 DIAGNOSIS — N95.2 VAGINAL ATROPHY: ICD-10-CM

## 2021-12-16 PROCEDURE — G0439 PPPS, SUBSEQ VISIT: HCPCS | Performed by: INTERNAL MEDICINE

## 2021-12-16 RX ORDER — FAMOTIDINE 20 MG/1
20 TABLET ORAL 2 TIMES DAILY
Qty: 60 TABLET | Refills: 2 | Status: SHIPPED | OUTPATIENT
Start: 2021-12-16

## 2021-12-16 NOTE — PATIENT INSTRUCTIONS
[address] SCHEDULE   Tests on this list are recommended by your physician but may not be covered, or covered at this frequency, by your insurer. Please check with your insurance carrier before scheduling to verify coverage.    OK 11/15/2018      No recommendations at this time   Pap and Pelvic    Pap   Covered every 2 years for women at normal risk;  Annually if at high risk -  No recommendations at this time    Chlamydia Annually if high risk -  No recommendations at this time   Sc http://www. [place].state. [place]/public/books/advin.htm  A link to the [place]. This site has a lot of good information including definitions of the different types of Advance Directives.  It also has the State forms available on it's webs

## 2021-12-16 NOTE — PROGRESS NOTES
HPI:   Radha Castillo is a 66year old female who presents for a Medicare Subsequent Annual Wellness visit (Pt already had Initial Annual Wellness). Very pleasant patient here with her  for wellness.   H/o meningioma resection in 2016, HT Therapist  Che Melendez MD as Obstetrician ([facility])  David Crowder PT as Physical Therapist (Physical Therapy)    Patient Active Problem List:     HTN (hypertension)     Hyperlipidemia     Laboratory examination ordered as part o Vaginal Cream, Place 1 g vaginally daily.   LATANOPROST 0.005 % Ophthalmic Solution, INSTILL 1 DROP IN BOTH EYES EVERY NIGHT  Dorzolamide HCl-Timolol Mal 22.3-6.8 MG/ML Ophthalmic Solution,     lidocaine HCl (XYLOCAINE) 1 % injection 5 mL  triamcinolone ace hypertrophied turbinates   Throat: OP-clear   Neck: Supple, symmetrical, trachea midline, no adenopathy;  thyroid: not enlarged, symmetric, no tenderness/mass/nodules; no carotid bruit or JVD   Back:   Symmetric, no curvature, ROM normal, no CVA tenderness hypertension- controlled, CPM    Vaginal atrophy- continue estradiol cream    Pelvic organ prolapse quantification stage 1 cystocele- continue pelvic PT    Status post resection of meningioma- stable, no acute issues    Depressed mood- doing well on duloxe Panel  Cholesterol  Lipoprotein (HDL)  Triglycerides Covered every 5 years for all Medicare beneficiaries without apparent signs or symptoms of cardiovascular disease Lab Results   Component Value Date    CHOLEST 162 11/17/2021    HDL 59 11/17/2021    LDL 13: 07/15/2015    [id number]: 06/04/2014     No recommendations at this time    Hepatitis B One screening covered for patients with certain risk factors   -  No recommendations at this time    Tetanus Toxoid Not covered by Medicare Part B unless medically

## 2021-12-22 ENCOUNTER — APPOINTMENT (OUTPATIENT)
Dept: PHYSICAL THERAPY | Facility: HOSPITAL | Age: 71
End: 2021-12-22
Attending: OBSTETRICS & GYNECOLOGY
Payer: MEDICARE

## 2021-12-29 ENCOUNTER — OFFICE VISIT (OUTPATIENT)
Dept: PHYSICAL THERAPY | Facility: HOSPITAL | Age: 71
End: 2021-12-29
Attending: OBSTETRICS & GYNECOLOGY
Payer: MEDICARE

## 2021-12-29 PROCEDURE — 97112 NEUROMUSCULAR REEDUCATION: CPT

## 2021-12-29 PROCEDURE — 97110 THERAPEUTIC EXERCISES: CPT

## 2021-12-29 NOTE — PROGRESS NOTES
Dx: Vaginal prolapse without uterine prolapse (N81.10)            Insurance (Authorized # of Visits):  medicare           Authorizing Physician: Dr. Jimena De La Vega  Next MD visit: none scheduled  Fall Risk: standard         Precautions: Fall risk but no reports of R/L   Standing-  Kegel + iso hip add + squat w ball behind back x20     HEP issued for above  Latex free RTB issued                       HEP: see above notes, Kegel 10 repetitions 3x/day,  5sec on/10 sec off  Skilled selection of therex and manual therapy complaints: 1 year ago  Abdominal/Vaginal Pressure complaints: yes  Urinary Frequency: 1-2 hours  Urine Stream: variable  Amount: variable  Pad use: pantyliner for pelvic floor support  Nocturia: 1x  Fluid Intake: water  Post void dribble: no  Hovering: ye all directions     Strength (MMT) 5/5 JOHN LE except L hip 3-/5  Transverse Abdominis: 1/5    Flexibility Summary: WNL JOHN LE except hamstrings -20 B 90/90    External Observation:   Voluntary contraction: present   Voluntary relaxation: present  Involuntar

## 2022-01-04 RX ORDER — NAPROXEN 500 MG/1
TABLET ORAL
Qty: 180 TABLET | Refills: 0 | Status: SHIPPED | OUTPATIENT
Start: 2022-01-04

## 2022-01-04 NOTE — TELEPHONE ENCOUNTER
Last OV  12/16/21  Last PE 12/16/21  Last REFILL   Medication Quantity Refills Start End   NAPROXEN 500 MG Oral Tab 180 tablet 0 10/8/2021      Last LABS 11/17/21 tsh, lipid, cmp, cbc     Future Appointments   Date Time Provider Carter Huang   1/5/202

## 2022-01-05 ENCOUNTER — OFFICE VISIT (OUTPATIENT)
Dept: PHYSICAL THERAPY | Facility: HOSPITAL | Age: 72
End: 2022-01-05
Attending: OBSTETRICS & GYNECOLOGY
Payer: MEDICARE

## 2022-01-05 PROCEDURE — 97110 THERAPEUTIC EXERCISES: CPT

## 2022-01-05 PROCEDURE — 97112 NEUROMUSCULAR REEDUCATION: CPT

## 2022-01-05 NOTE — PROGRESS NOTES
Dx: Vaginal prolapse without uterine prolapse (N81.10)            Insurance (Authorized # of Visits):  medicare           Authorizing Physician: Dr. Zeus Morley  Next MD visit: none scheduled  Fall Risk: standard         Precautions: Fall risk but no reports of TX#: 5/ Date:    Tx#: 6/   neuromuscular re-education   [place] instructions/training  abdominal bracing training-HEP  pelvic brace training-HEP  Pt opted against biofeedback    Bladder diary  Urge deferment   Bladder fitness-discussion + handou -back/sciatic    Occupation/Activitipt es: retired, watch [employer], used to walk more but Covid affecting pt going to the gym  PFDI-20: 58.33/300;  Impairment= 19.4 %    Dalsy describes prior level of function less feeling of prolapse with activities of da Floor Muscle strength: (PERF= Power/Endurance/Reps/Fast) MMT: 1/3/3/unable to do  Accessory Muscle Use: gluteals, adductors, abdominals    Tissue Laxity Test:  Anterior Wall: Mod  Posterior Wall: WNL  Apical: WNL-no uterus    Bearing down Valsalva maneuver

## 2022-01-12 ENCOUNTER — APPOINTMENT (OUTPATIENT)
Dept: PHYSICAL THERAPY | Facility: HOSPITAL | Age: 72
End: 2022-01-12
Attending: OBSTETRICS & GYNECOLOGY
Payer: MEDICARE

## 2022-01-14 ENCOUNTER — OFFICE VISIT (OUTPATIENT)
Dept: PHYSICAL THERAPY | Facility: HOSPITAL | Age: 72
End: 2022-01-14
Attending: OBSTETRICS & GYNECOLOGY
Payer: MEDICARE

## 2022-01-14 PROCEDURE — 97112 NEUROMUSCULAR REEDUCATION: CPT

## 2022-01-14 PROCEDURE — 97110 THERAPEUTIC EXERCISES: CPT

## 2022-01-14 NOTE — PROGRESS NOTES
Dx: Vaginal prolapse without uterine prolapse (N81.10)            Insurance (Authorized # of Visits):  medicare           Authorizing Physician: Dr. Paula Baumann  Next MD visit: none scheduled  Fall Risk: standard         Precautions: Fall risk but no reports of TX#: 5/ Date:    Tx#: 6/   neuromuscular re-education   [place] instructions/training  abdominal bracing training-HEP  pelvic brace training-HEP  Pt opted against biofeedback    Bladder diary  Urge deferment   Bladder fitness-discussion + handout    Amy 2x/week which has helped to decrease prolapse. Having increased urgency presently that has improved since 2016 surgery for brain meningioma. Still has L LE weakness. No falls. Started using REMOTV after surgery. Had a hysterectomy 32 years ago.  Had epidural Motion  Lumbar AROM screen: wfl-slow, cautious movements  LE AROM screen: grossly WNL except: L LE weakness, 50% AROM L hip all directions     Strength (MMT) 5/5 JOHN LE except L hip 3-/5  Transverse Abdominis: 1/5    Flexibility Summary: WNL JOHN LE except

## 2022-01-19 ENCOUNTER — OFFICE VISIT (OUTPATIENT)
Dept: PHYSICAL THERAPY | Facility: HOSPITAL | Age: 72
End: 2022-01-19
Attending: OBSTETRICS & GYNECOLOGY
Payer: MEDICARE

## 2022-01-19 PROCEDURE — 97110 THERAPEUTIC EXERCISES: CPT

## 2022-01-19 PROCEDURE — 97112 NEUROMUSCULAR REEDUCATION: CPT

## 2022-01-19 NOTE — PROGRESS NOTES
Dx: Vaginal prolapse without uterine prolapse (N81.10)            Insurance (Authorized # of Visits):  medicare           Authorizing Physician: Dr. Paula Baumann  Next MD visit: none scheduled  Fall Risk: standard         Precautions: Fall risk but no reports of instructions/training  abdominal bracing training-HEP  pelvic brace training-HEP  Pt opted against biofeedback    Bladder diary  Urge deferment   Bladder fitness-discussion + handout    Night times strategies-handout    pelvic brace w simulated activities services provided     Charges: TEx2, NM Red       Total Timed Treatment: 45 min  Total Treatment Time: 45 min  Initial evaluation:   PATIENT SUMMARY   Cheyenne Garay is a 66year old female  who presents to therapy today with complaints of pelvic fl some times every other day  Stool consistency: [place] Stool Scale: 1, 3, 4      Gait: pt ambulates on level ground with assistive device of SBQC, antalgia, decreased step length L and decreased stance phase L, + trendelenberg on L w R LE weightbearing

## 2022-01-24 ENCOUNTER — ORDER TRANSCRIPTION (OUTPATIENT)
Dept: PHYSICAL THERAPY | Facility: HOSPITAL | Age: 72
End: 2022-01-24

## 2022-01-24 DIAGNOSIS — M48.062 PSEUDOCLAUDICATION SYNDROME: Primary | ICD-10-CM

## 2022-01-24 DIAGNOSIS — M43.16 SPONDYLOLISTHESIS OF LUMBAR REGION: ICD-10-CM

## 2022-01-26 ENCOUNTER — OFFICE VISIT (OUTPATIENT)
Dept: PHYSICAL THERAPY | Facility: HOSPITAL | Age: 72
End: 2022-01-26
Attending: OBSTETRICS & GYNECOLOGY
Payer: MEDICARE

## 2022-01-26 PROCEDURE — 97112 NEUROMUSCULAR REEDUCATION: CPT

## 2022-01-26 PROCEDURE — 97110 THERAPEUTIC EXERCISES: CPT

## 2022-01-26 NOTE — PROGRESS NOTES
Dx: Vaginal prolapse without uterine prolapse (N81.10)            Insurance (Authorized # of Visits):  medicare           Authorizing Physician: Dr. Naomi Lopez  Next MD visit: none scheduled  Fall Risk: standard         Precautions: Fall risk but no reports of brace-continue        Date: 12/29/2021  TX#: 2/10 Date:     1/5/2022             TX#: 3/ Date:        1/14/2022          TX#: 4/ Date:          1/19/2022       TX#: 5/ Date: 1/26/2022   Tx#: 6/ Date: Tx#: Date:    Tx#:   neuromuscular re-education   [place] retraction + row w RTB x10  kegel + reach arm up R/L x10 ea 1#    Frequent cueing for correct posture NuStep 5 min L3  Tilt board-  F/B x30 +kegel's  S/S + kegel's x30  with coordinated breathing   Sitting-  Kegel +  iso hip add   with coordinated breathin increase hip strength. Past medical history was reviewed with Glenn. Significant findings include :  has a past medical history of Arthritis, Essential hypertension, Hyperlipidemia, Osteoarthritis, and Pure hypercholesterolemia.    L/S stenosis, spondyloli

## 2022-02-02 ENCOUNTER — OFFICE VISIT (OUTPATIENT)
Dept: PHYSICAL THERAPY | Facility: HOSPITAL | Age: 72
End: 2022-02-02
Attending: OBSTETRICS & GYNECOLOGY
Payer: MEDICARE

## 2022-02-02 PROCEDURE — 97112 NEUROMUSCULAR REEDUCATION: CPT

## 2022-02-02 PROCEDURE — 97110 THERAPEUTIC EXERCISES: CPT

## 2022-02-09 ENCOUNTER — APPOINTMENT (OUTPATIENT)
Dept: PHYSICAL THERAPY | Facility: HOSPITAL | Age: 72
End: 2022-02-09
Attending: OBSTETRICS & GYNECOLOGY
Payer: MEDICARE

## 2022-02-10 ENCOUNTER — OFFICE VISIT (OUTPATIENT)
Dept: PHYSICAL THERAPY | Facility: HOSPITAL | Age: 72
End: 2022-02-10
Attending: INTERNAL MEDICINE
Payer: MEDICARE

## 2022-02-10 PROCEDURE — 97112 NEUROMUSCULAR REEDUCATION: CPT

## 2022-02-10 PROCEDURE — 97535 SELF CARE MNGMENT TRAINING: CPT

## 2022-02-10 PROCEDURE — 97140 MANUAL THERAPY 1/> REGIONS: CPT

## 2022-02-10 PROCEDURE — 97110 THERAPEUTIC EXERCISES: CPT

## 2022-02-10 NOTE — PROGRESS NOTES
Discharge Summary    Pt has attended 8 visits in Physical Therapy. Dx: Vaginal prolapse without uterine prolapse (N81.10)    Fall Risk: standard         Precautions: Fall risk but no reports of recent falls , latex allergy          Subjective:Not having feeling of vaginal tissue coming out anymore. Having 50% less urgency. Straining some times with bowel movements-diet not filled with as much fiber recently. Usually not getting up at night. Still using strategies that she learned in therapy. Doing urge deferment. Not having feeling of urine coming out anymore. Feels like she is emptying her bladder fully now. Has feeling of prolapse some times, chela with standing. Pain: 0/10    Pt describes pain level: current 0/10, best 0/10, worst 7/10. -back/sciatic    Occupation/Activitipt es: retired, watch [email or web address], used to walk more but Covid affecting pt going to the gym  PFDI-20: 58.33/300;  Impairment= 19.4 %    Objective: pt amb into dept with Proximex Anna R hand, slow transfers           URINARY HABITS  Types of symptoms: incomplete emptying, nocturia and other: urgency-RESOLVED  Abdominal/Vaginal Pressure complaints: yes-LESSENED  Urinary Frequency:  Every 2 hours (was: 1-2 hours)  Pad use: pantyliner for pelvic floor support  Nocturia: typically 0x (was:1x)  Hovering: no (was: yes)  Empty bladder just in case: yes-ONLY WHEN LEAVING THE HOUSE    BOWEL HABITS  Types of symptoms: Constipation -occasionally-PT REPORTS THAT SHE HASN'T BEEN EATING VEGETABLES/FRUITS  Frequency of bowel movements: daily and some times every other day  Stool consistency: Page Stool Scale: 1, 3, 4      Gait: pt ambulates on level ground with assistive device of SBQC, antalgia, decreased step length L and decreased stance phase L, + trendelenberg on L w R LE weightbearing    External Palpation: L lumbar/sacral paraspinal tightness    Range Of Motion  Lumbar AROM screen: wfl-slow, cautious movements  LE AROM screen: grossly WNL except: L LE weakness, 50% AROM L hip all directions (not re-tested)     Strength (MMT) 5/5 JOHN LE except L hip 3-/5 (not re-tested)  Transverse Abdominis: 2/5 (was:1/5)    External Observation:   Voluntary contraction: present   Voluntary relaxation: present  Involuntary contraction: absent  Involuntary relaxation: present      Internal Examination     Pelvic Floor Muscle strength: (PERF= Power/Endurance/Reps/Fast) MMT: 2/3/6/3 (WAS:1/3/3/unable to do)  Accessory Muscle Use: gluteals, adductors, abdominals-VERBAL CUEING FOR IMPROVING ISOLATED PELVIC FLOOR CONTRACTIONS    Tissue Laxity Test:  Anterior Wall: Mod  Posterior Wall: WNL  Apical: WNL-no uterus    Bearing down Valsalva maneuver (2-3x): + cystoclele    Internal Palpation: WNL except Bulbocavernosus L minimal restriction and tenderness, tenderness at introitus and surrounding region secondary to prolapse, no tenderness obturator internus     Assessment:Pt has made improvement in physical therapy with increased pelvic floor strength-minimal, increased core strength, and function with urge deferment  Almost all goals met. Pt motivated to continue HEP. Answered all questions regarding: pelvic floor muscles -pessary, options for prolapse. Reviewed prolapse do's and dont's , chela strategy with lifting grandchildren. Pt reminded of not increasing intra-abdominal pressure onto pelvic floor muscles with abdominal bracing and performing pelvic brace exercises. Goals:   Goals: (to be met in 10 visits)-  1. Independent in HEP and progression with improved understanding of bladder/bowel health, bladder retraining and long-term management. -MET  2. Patient will demonstrate improved bladder voiding habits to voiding every 2 hours without increased urgency-MET  3. Patient will demonstrate improve PFM isolation, coordination and strength to 2/6/6/6 so she is able to reduce urinary urge and decrease sensation of prolapse during ADL's.-PROGRESSING  4.  Pt to have increased Transverse abdominis strength to 2/10 to assist pelvic floor muscles with lifting-MET  5. PFM contraction before increase intra-abdominal pressure. -MET         Plan: Pt considered discharged from physical therapy. Pt instructed to call clinic if he/she has any further questions and to continue home exercise program.      Patient/Family/Caregiver was advised of these findings, precautions, and treatment options and has agreed to actively participate in planning and for this course of care. Thank you for your referral. If you have any questions, please contact me at Dept: [phone].     Sincerely,  Electronically signed by therapist: Jamie Covington PT             Date: 12/29/2021  TX#: 2/10 Date:     1/5/2022             TX#: 3/ Date:        1/14/2022          TX#: 4/ Date:          1/19/2022       TX#: 5/ Date: 1/26/2022   Tx#: 6/ Date: 2/2/2022   Tx#:7/ Date: 2/10/2022   Tx#:8/  Progress note   neuromuscular re-education   kegel instructions/training  abdominal bracing training-HEP  pelvic brace training-HEP  Pt opted against biofeedback    Bladder diary  Urge deferment   Bladder fitness-discussion + handout    Night times strategies-handout    pelvic brace w simulated activities of daily living and effect of gravity   Nocturia strategies  Urge deferment  prolapse do's and dont's  Anti gravity suggestions with activities of daily living     prolapse do's and dont's  Urge deferment        prolapse do's and dont's  Prolapse treatment options-pessary discussion  Urge deferment prolapse do's and dont's  Urge deferment  kegel's with activities of daily living , transferring, reaching Voiding-Urine, bowel  Strategies  constipation  Urge deferment  prolapse do's and dont's   PDFI-+ strategies for all dysfunctions  Verbal and manual cueing throughout for correct execution of exercises  kegel + lifting, transferring       Kegel +  iso hip add  10\" x10   Kegel +  Resisted   ER RTB  x10   Kegel +  U/LE lift  x10    Kegel + articulating bridge x10      Kegel + clamshell RTB x10 R/L   Standing-  Kegel + iso hip add + squat w ball behind back x20     HEP issued for above  Latex free RTB issued   NuStep 5min L2  Shuttle- DLP  25# with pelvic brace + iso hip adduction x10 reps, x30 reps without iso hip add    Standing on airex-quick flicks D60    Tilt board kegel R/L x2 min    Reviewed HEP     NuStep 7 min L2  Sitting-  Kegel +  iso hip add   with coordinated breathing x30    diaphragmatic breathing + kegel + with coordinated breathing-supine and sitting   x30 ea  Supine-  Kegel + articulating bridge x15 + iso hip add  Anti-gravity kegels with pillow under hips x30    Kegel + clamshell RTB 2x10 R/L -latex free theraband  kegel +stand<>sit x5    Sit on ball-  Balance x3 min  quick flicks A76           NuStep 5 min L3  Tilt board-  F/B x30  S/S + kegel's x30  Sitting-  Kegel +  iso hip add   with coordinated breathing x30    Supine-  kegel +stand<>sit x5  HSS 1 min R/L    Sit on ball-  Balance x3 min  quick flicks G18  Tonic kegel x30  scap retraction + row w RTB x10  kegel + reach arm up R/L x10 ea 1#    Frequent cueing for correct posture NuStep 5 min L3  Tilt board-  F/B x30 +kegel's  S/S + kegel's x30  with coordinated breathing   Sitting-  Kegel +  iso hip add   with coordinated breathing x10    Supine-  kegel +stand<>sit x5  HSS 1 min R/L  Sit on ball-  quick flicks C13  Tonic kegel x30  scap retraction + row w RTB x10    Shuttle- DLP  12# with pelvic brace + iso hip adduction x10, x30 reps , without ball x30       Frequent cueing for correct posture NuStep 5 min L3  Tilt board-  F/B x30 +kegel's  S/S + kegel's x30  with coordinated breathing   Sitting-  Kegel +  iso hip add   with coordinated breathing x10    Kegel + articulating bridge x20+ iso hip add     Supine-kegel + B ER RTB x20    Clamshell R/L x10 w RTB    Supine-  HSS 1 min R/L  Adductor stretch 30 sec  Piriformis stretch R/L 30 sec  Sit on ball-  quick flicks R70  Tonic kegel x30  scap retraction + row w RTB x10    Shuttle- DLP  25# with pelvic brace + x30     Frequent cueing for correct posture NuStep 5min L3  Reviewed HEP with specific suggestions for progression and use of fascially connected mm    Manual therapy:   Internal kegel retraining    Sitting pelvic brace training         Sitting-  quick flicks X12        HEP: see above notes, Kegel 10 repetitions 3x/day,  5sec on/10 sec off  Skilled selection of therex and manual therapy, education given throughout session, one on one services provided     Charges: Zen, NM Red , MT, self care      Total Timed Treatment: 53 min  Total Treatment Time: 53 min

## 2022-02-15 RX ORDER — DULOXETIN HYDROCHLORIDE 30 MG/1
CAPSULE, DELAYED RELEASE ORAL
Qty: 90 CAPSULE | Refills: 0 | Status: SHIPPED | OUTPATIENT
Start: 2022-02-15

## 2022-02-15 NOTE — TELEPHONE ENCOUNTER
Last annual 12/16/21 - Depressed mood- doing well on duloxetine  No future visits scheduled    Last fill given 8/26/21

## 2022-02-16 ENCOUNTER — OFFICE VISIT (OUTPATIENT)
Dept: PHYSICAL THERAPY | Facility: HOSPITAL | Age: 72
End: 2022-02-16
Attending: PHYSICAL MEDICINE & REHABILITATION
Payer: MEDICARE

## 2022-02-16 DIAGNOSIS — M48.062 PSEUDOCLAUDICATION SYNDROME: ICD-10-CM

## 2022-02-16 DIAGNOSIS — M43.16 SPONDYLOLISTHESIS OF LUMBAR REGION: ICD-10-CM

## 2022-02-16 PROCEDURE — 97110 THERAPEUTIC EXERCISES: CPT

## 2022-02-16 PROCEDURE — 97163 PT EVAL HIGH COMPLEX 45 MIN: CPT

## 2022-02-23 ENCOUNTER — OFFICE VISIT (OUTPATIENT)
Dept: PHYSICAL THERAPY | Facility: HOSPITAL | Age: 72
End: 2022-02-23
Attending: PHYSICAL MEDICINE & REHABILITATION
Payer: MEDICARE

## 2022-02-23 DIAGNOSIS — M43.16 SPONDYLOLISTHESIS OF LUMBAR REGION: ICD-10-CM

## 2022-02-23 DIAGNOSIS — M48.062 PSEUDOCLAUDICATION SYNDROME: ICD-10-CM

## 2022-02-23 PROCEDURE — 97140 MANUAL THERAPY 1/> REGIONS: CPT

## 2022-02-23 PROCEDURE — 97110 THERAPEUTIC EXERCISES: CPT

## 2022-02-23 PROCEDURE — 97112 NEUROMUSCULAR REEDUCATION: CPT

## 2022-03-02 ENCOUNTER — OFFICE VISIT (OUTPATIENT)
Dept: PHYSICAL THERAPY | Facility: HOSPITAL | Age: 72
End: 2022-03-02
Attending: PHYSICAL MEDICINE & REHABILITATION
Payer: MEDICARE

## 2022-03-02 DIAGNOSIS — M43.16 SPONDYLOLISTHESIS OF LUMBAR REGION: ICD-10-CM

## 2022-03-02 DIAGNOSIS — M48.062 PSEUDOCLAUDICATION SYNDROME: ICD-10-CM

## 2022-03-02 PROCEDURE — 97140 MANUAL THERAPY 1/> REGIONS: CPT

## 2022-03-02 PROCEDURE — 97112 NEUROMUSCULAR REEDUCATION: CPT

## 2022-03-02 PROCEDURE — 97110 THERAPEUTIC EXERCISES: CPT

## 2022-03-09 ENCOUNTER — OFFICE VISIT (OUTPATIENT)
Dept: PHYSICAL THERAPY | Facility: HOSPITAL | Age: 72
End: 2022-03-09
Attending: PHYSICAL MEDICINE & REHABILITATION
Payer: MEDICARE

## 2022-03-09 DIAGNOSIS — M43.16 SPONDYLOLISTHESIS OF LUMBAR REGION: ICD-10-CM

## 2022-03-09 DIAGNOSIS — M48.062 PSEUDOCLAUDICATION SYNDROME: ICD-10-CM

## 2022-03-09 PROCEDURE — 97110 THERAPEUTIC EXERCISES: CPT

## 2022-03-09 PROCEDURE — 97112 NEUROMUSCULAR REEDUCATION: CPT

## 2022-03-09 PROCEDURE — 97140 MANUAL THERAPY 1/> REGIONS: CPT

## 2022-03-16 ENCOUNTER — APPOINTMENT (OUTPATIENT)
Dept: PHYSICAL THERAPY | Facility: HOSPITAL | Age: 72
End: 2022-03-16
Attending: PHYSICAL MEDICINE & REHABILITATION
Payer: MEDICARE

## 2022-03-18 RX ORDER — ATORVASTATIN CALCIUM 10 MG/1
TABLET, FILM COATED ORAL
Qty: 90 TABLET | Refills: 1 | Status: SHIPPED | OUTPATIENT
Start: 2022-03-18

## 2022-03-18 NOTE — TELEPHONE ENCOUNTER
Last VISIT - 12/16/21 [facility] adult    Last CPE - 12/16/21    Last REFILL -      ATORVASTATIN 10 MG Oral Tab 90 tablet 1 8/16/2021      Last LABS - 11/17/21 tsh, lipid, cmp, cbc    Per PROTOCOL? PASSED    Please Approve or Deny.

## 2022-03-22 ENCOUNTER — PATIENT MESSAGE (OUTPATIENT)
Dept: INTERNAL MEDICINE CLINIC | Facility: CLINIC | Age: 72
End: 2022-03-22

## 2022-03-23 ENCOUNTER — OFFICE VISIT (OUTPATIENT)
Dept: PHYSICAL THERAPY | Facility: HOSPITAL | Age: 72
End: 2022-03-23
Attending: PHYSICAL MEDICINE & REHABILITATION
Payer: MEDICARE

## 2022-03-23 DIAGNOSIS — M43.16 SPONDYLOLISTHESIS OF LUMBAR REGION: ICD-10-CM

## 2022-03-23 DIAGNOSIS — M48.062 PSEUDOCLAUDICATION SYNDROME: ICD-10-CM

## 2022-03-23 PROCEDURE — 97112 NEUROMUSCULAR REEDUCATION: CPT

## 2022-03-23 PROCEDURE — 97140 MANUAL THERAPY 1/> REGIONS: CPT

## 2022-03-23 PROCEDURE — 97110 THERAPEUTIC EXERCISES: CPT

## 2022-03-23 NOTE — TELEPHONE ENCOUNTER
Pan Ann MD  [facility] 13 hours ago (8:38 PM)     Please get her in for evaluation within one week. She will need extended f/u, I am going out of town soon so I let her know it will likely be one of my partners.

## 2022-03-23 NOTE — TELEPHONE ENCOUNTER
Future Appointments   Date Time Provider Carter Huang   3/23/2022 11:45 AM Juan Lin, PT [facility]   3/30/2022 11:45 AM Juan Lin, PT [facility]   3/31/2022  4:40 PM Nba Green MD [facility]   4/6/2022 11:45 AM Juan Lin, PT [facility]   4/13/2022 11:45 AM Juan Lin, PT [facility]

## 2022-03-28 ENCOUNTER — TELEPHONE (OUTPATIENT)
Dept: INTERNAL MEDICINE CLINIC | Facility: CLINIC | Age: 72
End: 2022-03-28

## 2022-03-28 NOTE — TELEPHONE ENCOUNTER
Future Appointments   Date Time Provider Carter Joaquini   4/5/2022  1:00 PM Jocelyn Matos MD [facility]     Pt cx appt w/MK this Thursday and only wants to see TB-I did r/s w/TB for 4/5-pt saw her nephew who is cardiologist and he started her on new meds and said ok to wait until TB comes back into the office-FYI

## 2022-03-30 ENCOUNTER — APPOINTMENT (OUTPATIENT)
Dept: PHYSICAL THERAPY | Facility: HOSPITAL | Age: 72
End: 2022-03-30
Attending: PHYSICAL MEDICINE & REHABILITATION
Payer: MEDICARE

## 2022-03-31 RX ORDER — NAPROXEN 500 MG/1
TABLET ORAL
Qty: 180 TABLET | Refills: 0 | Status: SHIPPED | OUTPATIENT
Start: 2022-03-31

## 2022-03-31 NOTE — TELEPHONE ENCOUNTER
Last VISIT - 12/16/21 Well adult    Last CPE - 12/16/21    Last REFILL -     NAPROXEN 500 MG Oral Tab 180 tablet 0 1/4/2022     Last LABS - 11/17/21 tsh, lipid, cmp, cbc    Future Appointments   Date Time Provider Carter Huang   4/5/2022  1:00 PM Dustin Garcia MD [facility]     Per PROTOCOL? None    Please Approve or Deny.

## 2022-04-01 ENCOUNTER — LAB ENCOUNTER (OUTPATIENT)
Dept: LAB | Age: 72
End: 2022-04-01
Attending: INTERNAL MEDICINE
Payer: MEDICARE

## 2022-04-01 DIAGNOSIS — R42 DIZZINESS: ICD-10-CM

## 2022-04-01 LAB
ANION GAP SERPL CALC-SCNC: 6 MMOL/L (ref 0–18)
BASOPHILS # BLD AUTO: 0.07 X10(3) UL (ref 0–0.2)
BASOPHILS NFR BLD AUTO: 1.3 %
BUN BLD-MCNC: 17 MG/DL (ref 7–18)
CALCIUM BLD-MCNC: 9.1 MG/DL (ref 8.5–10.1)
CHLORIDE SERPL-SCNC: 107 MMOL/L (ref 98–112)
CO2 SERPL-SCNC: 25 MMOL/L (ref 21–32)
CREAT BLD-MCNC: 0.86 MG/DL
EOSINOPHIL # BLD AUTO: 0.38 X10(3) UL (ref 0–0.7)
EOSINOPHIL NFR BLD AUTO: 7 %
ERYTHROCYTE [DISTWIDTH] IN BLOOD BY AUTOMATED COUNT: 14.5 %
GLUCOSE BLD-MCNC: 91 MG/DL (ref 70–99)
HCT VFR BLD AUTO: 37.2 %
HGB BLD-MCNC: 11.7 G/DL
IMM GRANULOCYTES # BLD AUTO: 0.02 X10(3) UL (ref 0–1)
IMM GRANULOCYTES NFR BLD: 0.4 %
LYMPHOCYTES # BLD AUTO: 1.83 X10(3) UL (ref 1–4)
LYMPHOCYTES NFR BLD AUTO: 33.6 %
MCHC RBC AUTO-ENTMCNC: 31.5 G/DL (ref 31–37)
MCV RBC AUTO: 96.9 FL
MONOCYTES # BLD AUTO: 0.58 X10(3) UL (ref 0.1–1)
MONOCYTES NFR BLD AUTO: 10.6 %
NEUTROPHILS # BLD AUTO: 2.57 X10 (3) UL (ref 1.5–7.7)
NEUTROPHILS # BLD AUTO: 2.57 X10(3) UL (ref 1.5–7.7)
NEUTROPHILS NFR BLD AUTO: 47.1 %
OSMOLALITY SERPL CALC.SUM OF ELEC: 287 MOSM/KG (ref 275–295)
PLATELET # BLD AUTO: 176 10(3)UL (ref 150–450)
POTASSIUM SERPL-SCNC: 3.9 MMOL/L (ref 3.5–5.1)
RBC # BLD AUTO: 3.84 X10(6)UL
SODIUM SERPL-SCNC: 138 MMOL/L (ref 136–145)
WBC # BLD AUTO: 5.5 X10(3) UL (ref 4–11)

## 2022-04-01 PROCEDURE — 36415 COLL VENOUS BLD VENIPUNCTURE: CPT

## 2022-04-01 PROCEDURE — 80048 BASIC METABOLIC PNL TOTAL CA: CPT

## 2022-04-01 PROCEDURE — 85025 COMPLETE CBC W/AUTO DIFF WBC: CPT

## 2022-04-04 ENCOUNTER — APPOINTMENT (OUTPATIENT)
Dept: MRI IMAGING | Facility: HOSPITAL | Age: 72
End: 2022-04-04
Attending: EMERGENCY MEDICINE
Payer: MEDICARE

## 2022-04-04 ENCOUNTER — HOSPITAL ENCOUNTER (EMERGENCY)
Facility: HOSPITAL | Age: 72
Discharge: HOME | End: 2022-04-04
Attending: EMERGENCY MEDICINE
Payer: MEDICARE

## 2022-04-04 VITALS
WEIGHT: 175 LBS | SYSTOLIC BLOOD PRESSURE: 173 MMHG | BODY MASS INDEX: 32.2 KG/M2 | RESPIRATION RATE: 22 BRPM | OXYGEN SATURATION: 98 % | HEIGHT: 62 IN | HEART RATE: 73 BPM | DIASTOLIC BLOOD PRESSURE: 82 MMHG

## 2022-04-04 DIAGNOSIS — R42 LIGHTHEADEDNESS: Primary | ICD-10-CM

## 2022-04-04 LAB
ALBUMIN SERPL-MCNC: 3.5 G/DL (ref 3.4–5)
ALBUMIN/GLOB SERPL: 1.2 {RATIO} (ref 1–2)
ALP LIVER SERPL-CCNC: 98 U/L
ALT SERPL-CCNC: 27 U/L
ANION GAP SERPL CALC-SCNC: 5 MMOL/L (ref 0–18)
AST SERPL-CCNC: 32 U/L (ref 15–37)
ATRIAL RATE: 61 BPM
BASOPHILS # BLD AUTO: 0.07 X10(3) UL (ref 0–0.2)
BASOPHILS NFR BLD AUTO: 1.2 %
BILIRUB SERPL-MCNC: 0.6 MG/DL (ref 0.1–2)
BUN BLD-MCNC: 15 MG/DL (ref 7–18)
CALCIUM BLD-MCNC: 9.1 MG/DL (ref 8.5–10.1)
CHLORIDE SERPL-SCNC: 108 MMOL/L (ref 98–112)
CO2 SERPL-SCNC: 27 MMOL/L (ref 21–32)
CREAT BLD-MCNC: 0.99 MG/DL
EOSINOPHIL # BLD AUTO: 0.34 X10(3) UL (ref 0–0.7)
EOSINOPHIL NFR BLD AUTO: 6 %
ERYTHROCYTE [DISTWIDTH] IN BLOOD BY AUTOMATED COUNT: 13.9 %
GLOBULIN PLAS-MCNC: 3 G/DL (ref 2.8–4.4)
GLUCOSE BLD-MCNC: 138 MG/DL (ref 70–99)
HCT VFR BLD AUTO: 34.8 %
HGB BLD-MCNC: 11.2 G/DL
IMM GRANULOCYTES # BLD AUTO: 0.01 X10(3) UL (ref 0–1)
IMM GRANULOCYTES NFR BLD: 0.2 %
LYMPHOCYTES # BLD AUTO: 1.63 X10(3) UL (ref 1–4)
LYMPHOCYTES NFR BLD AUTO: 28.7 %
MCH RBC QN AUTO: 30.3 PG (ref 26–34)
MCHC RBC AUTO-ENTMCNC: 32.2 G/DL (ref 31–37)
MCV RBC AUTO: 94.1 FL
MONOCYTES # BLD AUTO: 0.54 X10(3) UL (ref 0.1–1)
MONOCYTES NFR BLD AUTO: 9.5 %
NEUTROPHILS # BLD AUTO: 3.08 X10 (3) UL (ref 1.5–7.7)
NEUTROPHILS # BLD AUTO: 3.08 X10(3) UL (ref 1.5–7.7)
NEUTROPHILS NFR BLD AUTO: 54.4 %
OSMOLALITY SERPL CALC.SUM OF ELEC: 293 MOSM/KG (ref 275–295)
P AXIS: 25 DEGREES
P-R INTERVAL: 164 MS
PLATELET # BLD AUTO: 162 10(3)UL (ref 150–450)
POTASSIUM SERPL-SCNC: 4.3 MMOL/L (ref 3.5–5.1)
PROT SERPL-MCNC: 6.5 G/DL (ref 6.4–8.2)
Q-T INTERVAL: 376 MS
QRS DURATION: 80 MS
QTC CALCULATION (BEZET): 378 MS
R AXIS: -19 DEGREES
RBC # BLD AUTO: 3.7 X10(6)UL
SARS-COV-2 RNA RESP QL NAA+PROBE: NOT DETECTED
SODIUM SERPL-SCNC: 140 MMOL/L (ref 136–145)
T AXIS: -34 DEGREES
VENTRICULAR RATE: 61 BPM
WBC # BLD AUTO: 5.7 X10(3) UL (ref 4–11)

## 2022-04-04 PROCEDURE — 93005 ELECTROCARDIOGRAM TRACING: CPT

## 2022-04-04 PROCEDURE — 70549 MR ANGIOGRAPH NECK W/O&W/DYE: CPT | Performed by: EMERGENCY MEDICINE

## 2022-04-04 PROCEDURE — 93010 ELECTROCARDIOGRAM REPORT: CPT | Performed by: EMERGENCY MEDICINE

## 2022-04-04 PROCEDURE — A9575 INJ GADOTERATE MEGLUMI 0.1ML: HCPCS | Performed by: EMERGENCY MEDICINE

## 2022-04-04 PROCEDURE — 99284 EMERGENCY DEPT VISIT MOD MDM: CPT | Performed by: EMERGENCY MEDICINE

## 2022-04-04 PROCEDURE — 70553 MRI BRAIN STEM W/O & W/DYE: CPT | Performed by: EMERGENCY MEDICINE

## 2022-04-04 PROCEDURE — 36415 COLL VENOUS BLD VENIPUNCTURE: CPT | Performed by: EMERGENCY MEDICINE

## 2022-04-04 PROCEDURE — 85025 COMPLETE CBC W/AUTO DIFF WBC: CPT | Performed by: EMERGENCY MEDICINE

## 2022-04-04 PROCEDURE — 99285 EMERGENCY DEPT VISIT HI MDM: CPT | Performed by: EMERGENCY MEDICINE

## 2022-04-04 PROCEDURE — 80053 COMPREHEN METABOLIC PANEL: CPT | Performed by: EMERGENCY MEDICINE

## 2022-04-04 PROCEDURE — 70546 MR ANGIOGRAPH HEAD W/O&W/DYE: CPT | Performed by: EMERGENCY MEDICINE

## 2022-04-04 NOTE — ED INITIAL ASSESSMENT (HPI)
PT states that she has been feeling \"dizzy\" for the last two weeks, symptoms are worse since yesterday, Sunday 04/03. PT also has been monitoring her blood pressure and noticed that is has been higher , new medications started a week ago. PT states that her dizziness \"gets worse when she is turning to her left\". PT states no changes in vision, speech or motor functions.

## 2022-04-05 ENCOUNTER — OFFICE VISIT (OUTPATIENT)
Dept: INTERNAL MEDICINE CLINIC | Facility: CLINIC | Age: 72
End: 2022-04-05
Payer: MEDICARE

## 2022-04-05 VITALS
SYSTOLIC BLOOD PRESSURE: 138 MMHG | TEMPERATURE: 97 F | BODY MASS INDEX: 32.24 KG/M2 | RESPIRATION RATE: 16 BRPM | DIASTOLIC BLOOD PRESSURE: 78 MMHG | HEIGHT: 62 IN | WEIGHT: 175.19 LBS | HEART RATE: 68 BPM

## 2022-04-05 DIAGNOSIS — I10 PRIMARY HYPERTENSION: ICD-10-CM

## 2022-04-05 DIAGNOSIS — R94.31 ABNORMAL EKG: ICD-10-CM

## 2022-04-05 DIAGNOSIS — E78.2 MIXED HYPERLIPIDEMIA: ICD-10-CM

## 2022-04-05 DIAGNOSIS — R42 DIZZINESS: Primary | ICD-10-CM

## 2022-04-05 PROCEDURE — 99214 OFFICE O/P EST MOD 30 MIN: CPT | Performed by: INTERNAL MEDICINE

## 2022-04-05 RX ORDER — MECLIZINE HYDROCHLORIDE CHEWABLE TABLETS 25 MG/1
25 TABLET, CHEWABLE ORAL 2 TIMES DAILY PRN
Qty: 30 TABLET | Refills: 1 | Status: SHIPPED | OUTPATIENT
Start: 2022-04-05 | End: 2022-05-05

## 2022-04-05 RX ORDER — LOSARTAN POTASSIUM 50 MG/1
50 TABLET ORAL 2 TIMES DAILY
Qty: 90 TABLET | Refills: 3 | COMMUNITY
Start: 2022-04-05

## 2022-04-06 ENCOUNTER — OFFICE VISIT (OUTPATIENT)
Dept: PHYSICAL THERAPY | Facility: HOSPITAL | Age: 72
End: 2022-04-06
Attending: PHYSICAL MEDICINE & REHABILITATION
Payer: MEDICARE

## 2022-04-06 DIAGNOSIS — M43.16 SPONDYLOLISTHESIS OF LUMBAR REGION: ICD-10-CM

## 2022-04-06 DIAGNOSIS — M48.062 PSEUDOCLAUDICATION SYNDROME: ICD-10-CM

## 2022-04-06 PROCEDURE — 97140 MANUAL THERAPY 1/> REGIONS: CPT

## 2022-04-06 PROCEDURE — 97112 NEUROMUSCULAR REEDUCATION: CPT

## 2022-04-06 PROCEDURE — 97110 THERAPEUTIC EXERCISES: CPT

## 2022-04-13 ENCOUNTER — OFFICE VISIT (OUTPATIENT)
Dept: PHYSICAL THERAPY | Facility: HOSPITAL | Age: 72
End: 2022-04-13
Attending: INTERNAL MEDICINE
Payer: MEDICARE

## 2022-04-13 ENCOUNTER — APPOINTMENT (OUTPATIENT)
Dept: PHYSICAL THERAPY | Facility: HOSPITAL | Age: 72
End: 2022-04-13
Attending: PHYSICAL MEDICINE & REHABILITATION
Payer: MEDICARE

## 2022-04-13 PROCEDURE — 97112 NEUROMUSCULAR REEDUCATION: CPT

## 2022-04-13 PROCEDURE — 97162 PT EVAL MOD COMPLEX 30 MIN: CPT

## 2022-04-15 ENCOUNTER — HOSPITAL ENCOUNTER (OUTPATIENT)
Dept: CV DIAGNOSTICS | Facility: HOSPITAL | Age: 72
Discharge: HOME | End: 2022-04-15
Attending: INTERNAL MEDICINE
Payer: MEDICARE

## 2022-04-15 DIAGNOSIS — R42 DIZZINESS: ICD-10-CM

## 2022-04-15 DIAGNOSIS — I10 PRIMARY HYPERTENSION: ICD-10-CM

## 2022-04-15 DIAGNOSIS — R94.31 ABNORMAL EKG: ICD-10-CM

## 2022-04-15 DIAGNOSIS — E78.2 MIXED HYPERLIPIDEMIA: ICD-10-CM

## 2022-04-15 PROCEDURE — 93017 CV STRESS TEST TRACING ONLY: CPT | Performed by: INTERNAL MEDICINE

## 2022-04-15 PROCEDURE — 78452 HT MUSCLE IMAGE SPECT MULT: CPT | Performed by: INTERNAL MEDICINE

## 2022-04-15 PROCEDURE — 93018 CV STRESS TEST I&R ONLY: CPT | Performed by: INTERNAL MEDICINE

## 2022-04-20 ENCOUNTER — OFFICE VISIT (OUTPATIENT)
Dept: PHYSICAL THERAPY | Facility: HOSPITAL | Age: 72
End: 2022-04-20
Attending: PHYSICAL MEDICINE & REHABILITATION
Payer: MEDICARE

## 2022-04-20 PROCEDURE — 97112 NEUROMUSCULAR REEDUCATION: CPT

## 2022-04-20 NOTE — PROGRESS NOTES
Dx: Dizziness             Insurance (Authorized # of Visits):  10 requested           Authorizing Physician: Dr. Lencho Tejeda  Next MD visit: none scheduled  Fall Risk: Moderate      Precautions: Fall Risk             Subjective: Patient states that she has been feeling overall better this week, the instance of dizziness and intensity of dizziness has been much less. Yesterday, she did not have any dizziness until 1-2 brief episodes in the afternoon, today, she has not had any episodes yet. She has noticed that she typically seems to have dizziness first thing in the morning getting out of bed, so she moves slowly. She lost her home exercises, so she has not been doing them. Had her nuclear stress test recently, got the results and was told that all looks good for her. Still currently taking meclizine 3x/day. BP readings have been looking better these past few days, diastolic has typically been good, systolic still sometimes reading higher in the 160s. Pain: N/A; patient not coming for pain-related diagnosis       Objective: Reviewed and re-issued HEP      Assessment: Patient overall tolerated tx well. Discussed her remaining symptoms and presentation, may be likely that her symptoms/improving symptoms are related to adjusting to BP medication dosage changed from ~3 weeks ago. Did continue to work on positional and habituation exercises to help her readjust to head and body movements, especially repetitive. She performed well with what was listed below, very mild dizziness end of session. Goals: (To be met in 10 visits)  1. Patient will report 0/10 dizziness x at least 1 week with normal activity. 2. Patient will demonstrate ability to perform repetitive HT to R and L as with shaking head 'no' without dizziness. 3. Patient will demonstrate ability to bend over to retrieve light weight item from the floor without dizziness.    4. Patient will be IND and compliant in HEP to maintain progress made in PT. Plan: Continue PT with progression as indicated. Date: 4/20/2022  TX#: 2/10 Date:                 TX#: 3/ Date:                 TX#: 4/ Date:                 TX#: 5/ Date:    Tx#: 6/   NR  - continued discussion, edu clinical findings, POC, HEP  - STS with pt fixation x 5  - normal STACY with R/L HT to targets x 10, 1UE assist  - normal STACY with up/down HT to targets, 1UE x 10  - fwd bend to tap target on chair x 10, 1UE assist  - pt fixation head/trunk twists with ball in sitting x 10 R/L  - diagonals with cones (3) into/out of cabinet       -       -       -       HEP: standing R/L HT to target, standing up/down HT to target, fwd bend tap to target on chair, STS with pt fixation    Charges: NR x 3       Total Timed Treatment: 40 min  Total Treatment Time: 40 min

## 2022-04-22 ENCOUNTER — OFFICE VISIT (OUTPATIENT)
Dept: PHYSICAL THERAPY | Facility: HOSPITAL | Age: 72
End: 2022-04-22
Attending: PHYSICAL MEDICINE & REHABILITATION
Payer: MEDICARE

## 2022-04-22 PROCEDURE — 97112 NEUROMUSCULAR REEDUCATION: CPT

## 2022-04-22 NOTE — PROGRESS NOTES
Dx: Dizziness             Insurance (Authorized # of Visits):  10 requested           Authorizing Physician: Dr. Aileen Melgar MD visit: none scheduled  Fall Risk: Moderate      Precautions: Fall Risk             Subjective: Patient states has still been doing better overall, she has had a few instances of dizziness, but continuing to get less and less. She did not take meclizine last night or this morning so that testing can be done, not quite sure how she is feeling this morning without it. BP readings have been much better, most recent reading was 128/68mmHg with HR 62bpm.     Pain: N/A; patient not coming for pain-related diagnosis       Objective:   Occulomotor & Vestibulo-Ocular Exam:  Spontaneous Nystagmus: room light: negative  Smooth Pursuit: Negative  Saccades: Negative  Gaze Evoked Nystagmus:  room light: Negative  Head Thrust: Negative  VOR screen:  Negative vertical and horizontal, mild reported dizziness with performance    VOR Cancellation: Negative   Convergence: Negative  Cover/Uncover:  Negative  Cross Cover:  Negative  Head Shaking Nystagmus: Negative, mild reported dizziness with performance         Assessment: Performed vestibulo-ocular and oculomotor assessment again now that patient has not been taking meclizine >12 hours; all testing continues to be negative. Continued to perform habituation tasks requiring HT to all directions in all planes including diagonals. Patient with greater report of feeling of dizziness with increase balance demands, this may be correlated. She would like to continue for a few more weeks to ensure that she continues to do well over the long term. Goals: (To be met in 10 visits)  1. Patient will report 0/10 dizziness x at least 1 week with normal activity. 2. Patient will demonstrate ability to perform repetitive HT to R and L as with shaking head 'no' without dizziness.    3. Patient will demonstrate ability to bend over to retrieve light weight item from the floor without dizziness. 4. Patient will be IND and compliant in HEP to maintain progress made in PT. - issued and progressed     Plan: Continue PT with progression as indicated. Date: 4/20/2022  TX#: 2/10 Date: 4/22/22             TX#: 3/10 Date:                 TX#: 4/ Date:                 TX#: 5/ Date:    Tx#: 6/   NR  - continued discussion, edu clinical findings, POC, HEP  - STS with pt fixation x 5  - normal STACY with R/L HT to targets x 10, 1UE assist  - normal STACY with up/down HT to targets, 1UE x 10  - fwd bend to tap target on chair x 10, 1UE assist  - pt fixation head/trunk twists with ball in sitting x 10 R/L  - diagonals with cones (3) into/out of cabinet NR  - continued assessment as needed and listed   - STS with pt fixation x 10, no UEs  - standing normal STACY trunk rotation R/L with pt fixation x 10 R/L; repeated up/down x 10 ea  - standing normal STACY with R/L HT to cones 2 x 10  - fwd bend to retrieve cones (6) from 6\" step with 1UE support x 2 sets   - standing reaching across body in diagonals for cones (5) x 1 set ea R/L  - seated head shaking with pt fixation, R/L 2 x 20 sec, up/down 2 x 20 sec      - -      - -      - -      HEP: standing R/L HT to target, standing up/down HT to target, fwd bend tap to target on chair, STS with pt fixation    Charges: NR x 3       Total Timed Treatment: 40 min  Total Treatment Time: 40 min

## 2022-04-28 ENCOUNTER — APPOINTMENT (OUTPATIENT)
Dept: PHYSICAL THERAPY | Facility: HOSPITAL | Age: 72
End: 2022-04-28
Attending: INTERNAL MEDICINE
Payer: MEDICARE

## 2022-04-29 ENCOUNTER — OFFICE VISIT (OUTPATIENT)
Dept: PHYSICAL THERAPY | Facility: HOSPITAL | Age: 72
End: 2022-04-29
Attending: INTERNAL MEDICINE
Payer: MEDICARE

## 2022-04-29 PROCEDURE — 97112 NEUROMUSCULAR REEDUCATION: CPT

## 2022-04-29 NOTE — PROGRESS NOTES
Dx: Dizziness             Insurance (Authorized # of Visits):  10 requested           Authorizing Physician: Dr. Jaki Lin  Next MD visit: none scheduled  Fall Risk: Moderate      Precautions: Fall Risk             Subjective: Patient states that overall, she has been doing well since last session. She has not really had any episodes of dizziness that she can recall, possibly some instances of very light symptoms, but very brief and when steadying herself, it would quickly pass. She has been taking meclizine regularly again, wanted to keep taking until she talked to her MD this coming week. While HR has continued to be very consistently in to 60s, BP has been up and down, fluctuating with some higher systolic readings in the 671V. Has been working on her HEP and they are all going well with exception of VOR x 1 vertical, feels that makes her mildly dizzy or gives her a feeling in her head that does not feel right. She otherwise feels that she is doing well regarding her dizziness. Pain: N/A; patient not coming for pain-related diagnosis       Objective:   Occulomotor & Vestibulo-Ocular Exam:  Spontaneous Nystagmus: room light: negative  Smooth Pursuit: Negative  Saccades: Negative  Gaze Evoked Nystagmus:  room light: Negative  Head Thrust: Negative  VOR screen:  Negative vertical and horizontal, mild reported dizziness with performance    VOR Cancellation: Negative   Convergence: Negative  Cover/Uncover:  Negative  Cross Cover:  Negative  Head Shaking Nystagmus: Negative, mild reported dizziness with performance         Assessment: Continued to discuss and assess symptoms. Patient reporting doing well and vestibulo-ocular/oculomotor assessment appears generally WNL. Patient to stop VOR x 1 vertical at this time due to feeling of discomfort with performing.  Patient does present with overall imbalance and impaired postural control, however, this is as result of previous neurological deficit and states that she has worked on this in other [address] [place] [address] and in her HEP, but would like therapist to update this for her. Educated on updated HEP and performed in session. Advised patient to please discuss her fluctuation in BP readings as it has been several weeks since she has been on adjusted dose of HTN medication and may need to be adjusted again if not working. She agrees. Will take break from PT to follow-up with MD and then return on 5/13 as needed. Goals: (To be met in 10 visits)  1. Patient will report 0/10 dizziness x at least 1 week with normal activity. 2. Patient will demonstrate ability to perform repetitive HT to R and L as with shaking head 'no' without dizziness. 3. Patient will demonstrate ability to bend over to retrieve light weight item from the floor without dizziness. 4. Patient will be IND and compliant in HEP to maintain progress made in PT. - issued and progressed     Plan: Continue PT with progression as indicated. Will hold from formal sessions with this POC, may return on 5/13 as needed. Date: 4/20/2022  TX#: 2/10 Date: 4/22/22             TX#: 3/10 Date: 4/29/22               TX#: 4/10 Date:                 TX#: 5/ Date:    Tx#: 6/   NR  - continued discussion, edu clinical findings, POC, HEP  - STS with pt fixation x 5  - normal STACY with R/L HT to targets x 10, 1UE assist  - normal STACY with up/down HT to targets, 1UE x 10  - fwd bend to tap target on chair x 10, 1UE assist  - pt fixation head/trunk twists with ball in sitting x 10 R/L  - diagonals with cones (3) into/out of cabinet NR  - continued assessment as needed and listed   - STS with pt fixation x 10, no UEs  - standing normal STACY trunk rotation R/L with pt fixation x 10 R/L; repeated up/down x 10 ea  - standing normal STACY with R/L HT to cones 2 x 10  - fwd bend to retrieve cones (6) from 6\" step with 1UE support x 2 sets   - standing reaching across body in diagonals for cones (5) x 1 set ea R/L  - seated head shaking with pt fixation, R/L 2 x 20 sec, up/down 2 x 20 sec NR  - continued assessment as needed and listed   - discussion, edu symptoms, POC, HEP, HEP progression; performed in session as needed including VOR cancellation, VOR x 1/horizontal head shaking, romberg stance, romberg with EC, mod tandem-3/4 tandem stance, walking with R/L HT   - discussion, fish cane types at patient request      - - -     - - -     - - -     HEP: standing R/L HT to target, standing up/down HT to target, fwd bend tap to target on chair, STS with pt fixation, VOR x 1, VOR cancellation, walking with R/L HT, romberg stance, mod tandem stance    Charges: NR x 3       Total Timed Treatment: 40 min  Total Treatment Time: 40 min

## 2022-05-02 ENCOUNTER — APPOINTMENT (OUTPATIENT)
Dept: PHYSICAL THERAPY | Facility: HOSPITAL | Age: 72
End: 2022-05-02
Attending: INTERNAL MEDICINE
Payer: MEDICARE

## 2022-05-02 ENCOUNTER — TELEPHONE (OUTPATIENT)
Dept: PHYSICAL THERAPY | Facility: HOSPITAL | Age: 72
End: 2022-05-02

## 2022-05-04 ENCOUNTER — OFFICE VISIT (OUTPATIENT)
Dept: PHYSICAL THERAPY | Facility: HOSPITAL | Age: 72
End: 2022-05-04
Attending: INTERNAL MEDICINE
Payer: MEDICARE

## 2022-05-04 ENCOUNTER — OFFICE VISIT (OUTPATIENT)
Dept: INTERNAL MEDICINE CLINIC | Facility: CLINIC | Age: 72
End: 2022-05-04
Payer: MEDICARE

## 2022-05-04 VITALS
BODY MASS INDEX: 33.57 KG/M2 | HEIGHT: 61.02 IN | WEIGHT: 177.81 LBS | OXYGEN SATURATION: 97 % | RESPIRATION RATE: 16 BRPM | DIASTOLIC BLOOD PRESSURE: 72 MMHG | TEMPERATURE: 97 F | SYSTOLIC BLOOD PRESSURE: 132 MMHG | HEART RATE: 66 BPM

## 2022-05-04 DIAGNOSIS — M48.061 SPINAL STENOSIS OF LUMBAR REGION WITHOUT NEUROGENIC CLAUDICATION: ICD-10-CM

## 2022-05-04 DIAGNOSIS — R42 DIZZINESS: Primary | ICD-10-CM

## 2022-05-04 DIAGNOSIS — I10 BENIGN ESSENTIAL HTN: ICD-10-CM

## 2022-05-04 PROCEDURE — 97110 THERAPEUTIC EXERCISES: CPT

## 2022-05-04 PROCEDURE — 99214 OFFICE O/P EST MOD 30 MIN: CPT | Performed by: INTERNAL MEDICINE

## 2022-05-04 PROCEDURE — 97112 NEUROMUSCULAR REEDUCATION: CPT

## 2022-05-04 RX ORDER — LOSARTAN POTASSIUM 50 MG/1
50 TABLET ORAL 2 TIMES DAILY
Qty: 180 TABLET | Refills: 3 | Status: SHIPPED | OUTPATIENT
Start: 2022-05-04

## 2022-05-04 RX ORDER — CARVEDILOL 6.25 MG/1
TABLET ORAL
Qty: 270 TABLET | Refills: 3 | Status: SHIPPED | OUTPATIENT
Start: 2022-05-04

## 2022-05-04 RX ORDER — BRIMONIDINE TARTRATE 2 MG/ML
15 SOLUTION/ DROPS OPHTHALMIC 2 TIMES DAILY
COMMUNITY
Start: 2022-04-30

## 2022-05-09 ENCOUNTER — APPOINTMENT (OUTPATIENT)
Dept: PHYSICAL THERAPY | Facility: HOSPITAL | Age: 72
End: 2022-05-09
Attending: INTERNAL MEDICINE
Payer: MEDICARE

## 2022-05-09 ENCOUNTER — TELEPHONE (OUTPATIENT)
Dept: PHYSICAL THERAPY | Facility: HOSPITAL | Age: 72
End: 2022-05-09

## 2022-05-09 NOTE — TELEPHONE ENCOUNTER
Patient LVM for therapist last week stating that she was going to be canceling her remaining PT appointments for vestibular therapy and continue working at home. She is feeling better and still working on regulating BP with her MD. Rosaura Raman her back and LVM stating message received, additional appts will be canceled and she will be placed on hold for the next few weeks, if do not hear from her, will D/C. Please call back with any questions or concerns.

## 2022-05-11 ENCOUNTER — APPOINTMENT (OUTPATIENT)
Dept: PHYSICAL THERAPY | Facility: HOSPITAL | Age: 72
End: 2022-05-11
Attending: INTERNAL MEDICINE
Payer: MEDICARE

## 2022-05-12 RX ORDER — DULOXETIN HYDROCHLORIDE 30 MG/1
CAPSULE, DELAYED RELEASE ORAL
Qty: 90 CAPSULE | Refills: 1 | Status: SHIPPED | OUTPATIENT
Start: 2022-05-12

## 2022-05-12 NOTE — TELEPHONE ENCOUNTER
Last VISIT - Extended f/u    Last CPE -     Last REFILL -     DULOXETINE 30 MG Oral Cap DR Particles 90 capsule 0 2/15/2022     Last LABS - 4/4/22 cmp, cbc 11/17/21 tsh, lipid    Future Appointments   Date Time Provider Carter Huang   8/5/2022 11:20 AM Nahid Cortes MD [facility]     Per PROTOCOL? None    Please Approve or Deny.

## 2022-05-16 ENCOUNTER — APPOINTMENT (OUTPATIENT)
Dept: PHYSICAL THERAPY | Facility: HOSPITAL | Age: 72
End: 2022-05-16
Attending: INTERNAL MEDICINE
Payer: MEDICARE

## 2022-05-25 ENCOUNTER — APPOINTMENT (OUTPATIENT)
Dept: PHYSICAL THERAPY | Facility: HOSPITAL | Age: 72
End: 2022-05-25
Attending: INTERNAL MEDICINE
Payer: MEDICARE

## 2022-06-01 ENCOUNTER — APPOINTMENT (OUTPATIENT)
Dept: PHYSICAL THERAPY | Facility: HOSPITAL | Age: 72
End: 2022-06-01
Attending: INTERNAL MEDICINE
Payer: MEDICARE

## 2022-06-08 ENCOUNTER — APPOINTMENT (OUTPATIENT)
Dept: PHYSICAL THERAPY | Facility: HOSPITAL | Age: 72
End: 2022-06-08
Attending: INTERNAL MEDICINE
Payer: MEDICARE

## 2022-06-15 ENCOUNTER — APPOINTMENT (OUTPATIENT)
Dept: PHYSICAL THERAPY | Facility: HOSPITAL | Age: 72
End: 2022-06-15
Attending: INTERNAL MEDICINE
Payer: MEDICARE

## 2022-06-22 ENCOUNTER — APPOINTMENT (OUTPATIENT)
Dept: PHYSICAL THERAPY | Facility: HOSPITAL | Age: 72
End: 2022-06-22
Attending: INTERNAL MEDICINE
Payer: MEDICARE

## 2022-07-02 DIAGNOSIS — Z20.822 EXPOSURE TO CONFIRMED CASE OF COVID-19: ICD-10-CM

## 2022-07-02 DIAGNOSIS — Z20.822 SUSPECTED COVID-19 VIRUS INFECTION: Primary | ICD-10-CM

## 2022-07-02 NOTE — PROGRESS NOTES
Pt having symptoms consistent with covid. Known exposure to covid+ person. Given age and within 5 days of symptom onset, will send rx for paxlovid. Pt may be candidate for monoclonal ab infusion at immediate care. She will consider this. Reminded infusion has to be within 5 days of symptom onset. Patient verbalized understanding of above instructions. No further questions at this time.     Kelly Chung, DO  7/2/2022

## 2022-08-05 ENCOUNTER — OFFICE VISIT (OUTPATIENT)
Dept: INTERNAL MEDICINE CLINIC | Facility: CLINIC | Age: 72
End: 2022-08-05
Payer: MEDICARE

## 2022-08-05 VITALS
BODY MASS INDEX: 34.55 KG/M2 | HEART RATE: 69 BPM | OXYGEN SATURATION: 98 % | TEMPERATURE: 98 F | DIASTOLIC BLOOD PRESSURE: 72 MMHG | SYSTOLIC BLOOD PRESSURE: 132 MMHG | RESPIRATION RATE: 16 BRPM | HEIGHT: 60.63 IN | WEIGHT: 180.63 LBS

## 2022-08-05 DIAGNOSIS — I10 ESSENTIAL HYPERTENSION: Primary | ICD-10-CM

## 2022-08-05 DIAGNOSIS — Z00.00 ENCOUNTER FOR ANNUAL HEALTH EXAMINATION: ICD-10-CM

## 2022-08-05 DIAGNOSIS — Z12.31 ENCOUNTER FOR SCREENING MAMMOGRAM FOR MALIGNANT NEOPLASM OF BREAST: ICD-10-CM

## 2022-08-05 DIAGNOSIS — R14.0 ABDOMINAL BLOATING: ICD-10-CM

## 2022-08-05 DIAGNOSIS — G89.29 OTHER CHRONIC PAIN: ICD-10-CM

## 2022-08-05 DIAGNOSIS — R63.5 WEIGHT GAIN: ICD-10-CM

## 2022-08-05 DIAGNOSIS — Z12.11 ENCOUNTER FOR SCREENING FECAL OCCULT BLOOD TESTING: ICD-10-CM

## 2022-08-05 PROCEDURE — 1126F AMNT PAIN NOTED NONE PRSNT: CPT | Performed by: INTERNAL MEDICINE

## 2022-08-05 PROCEDURE — 99214 OFFICE O/P EST MOD 30 MIN: CPT | Performed by: INTERNAL MEDICINE

## 2022-08-05 RX ORDER — FUROSEMIDE 20 MG/1
20 TABLET ORAL DAILY
Qty: 90 TABLET | Refills: 3 | Status: SHIPPED | OUTPATIENT
Start: 2022-08-05

## 2022-08-05 RX ORDER — DULOXETIN HYDROCHLORIDE 60 MG/1
60 CAPSULE, DELAYED RELEASE ORAL DAILY
Qty: 90 CAPSULE | Refills: 1 | Status: SHIPPED | OUTPATIENT
Start: 2022-08-05

## 2022-09-07 ENCOUNTER — TELEPHONE (OUTPATIENT)
Dept: RHEUMATOLOGY | Facility: CLINIC | Age: 72
End: 2022-09-07

## 2022-09-07 DIAGNOSIS — L30.9 DERMATITIS: Primary | ICD-10-CM

## 2022-09-07 RX ORDER — TRIAMCINOLONE ACETONIDE 1 MG/G
CREAM TOPICAL 2 TIMES DAILY PRN
Qty: 80 G | Refills: 3 | Status: SHIPPED | OUTPATIENT
Start: 2022-09-07

## 2022-09-07 NOTE — TELEPHONE ENCOUNTER
Pt called requesting prescription for triamcinolone cream for skin rash. rx sent to pharmacy on file. Reminded pt to apply twice daily x 1 week then take at least 7-10 days off from applying medication before reapplying if needed. Patient verbalized understanding of above instructions. No further questions at this time.     Elena Peralta DO  [facility]  9/7/2022

## 2022-09-12 DIAGNOSIS — E78.00 PURE HYPERCHOLESTEROLEMIA: ICD-10-CM

## 2022-09-14 RX ORDER — ATORVASTATIN CALCIUM 10 MG/1
TABLET, FILM COATED ORAL
Qty: 90 TABLET | Refills: 1 | Status: SHIPPED | OUTPATIENT
Start: 2022-09-14

## 2022-09-23 ENCOUNTER — OFFICE VISIT (OUTPATIENT)
Dept: OBGYN CLINIC | Facility: CLINIC | Age: 72
End: 2022-09-23

## 2022-09-23 VITALS
BODY MASS INDEX: 34.31 KG/M2 | HEART RATE: 67 BPM | DIASTOLIC BLOOD PRESSURE: 80 MMHG | HEIGHT: 60.63 IN | SYSTOLIC BLOOD PRESSURE: 138 MMHG | WEIGHT: 179.38 LBS

## 2022-09-23 DIAGNOSIS — Z01.419 WELL WOMAN EXAM WITH ROUTINE GYNECOLOGICAL EXAM: Primary | ICD-10-CM

## 2022-09-23 DIAGNOSIS — Z12.31 ENCOUNTER FOR SCREENING MAMMOGRAM FOR MALIGNANT NEOPLASM OF BREAST: ICD-10-CM

## 2022-09-23 DIAGNOSIS — N81.10 PELVIC ORGAN PROLAPSE QUANTIFICATION STAGE 1 CYSTOCELE: ICD-10-CM

## 2022-09-23 DIAGNOSIS — R21 RASH, SKIN: ICD-10-CM

## 2022-09-23 DIAGNOSIS — N95.2 VAGINAL ATROPHY: ICD-10-CM

## 2022-09-23 PROCEDURE — G0101 CA SCREEN;PELVIC/BREAST EXAM: HCPCS | Performed by: OBSTETRICS & GYNECOLOGY

## 2022-09-23 RX ORDER — CLOTRIMAZOLE 1 %
1 CREAM (GRAM) TOPICAL 2 TIMES DAILY
Qty: 40 G | Refills: 0 | Status: SHIPPED | OUTPATIENT
Start: 2022-09-23

## 2022-09-23 RX ORDER — ESTRADIOL 0.1 MG/G
1 CREAM VAGINAL
Qty: 42.5 G | Refills: 1 | Status: SHIPPED | OUTPATIENT
Start: 2022-09-23

## 2022-10-07 ENCOUNTER — TELEPHONE (OUTPATIENT)
Dept: INTERNAL MEDICINE CLINIC | Facility: CLINIC | Age: 72
End: 2022-10-07

## 2022-10-07 ENCOUNTER — APPOINTMENT (OUTPATIENT)
Dept: GENERAL RADIOLOGY | Age: 72
End: 2022-10-07
Attending: PHYSICIAN ASSISTANT
Payer: MEDICARE

## 2022-10-07 ENCOUNTER — HOSPITAL ENCOUNTER (OUTPATIENT)
Age: 72
Discharge: HOME | End: 2022-10-07
Payer: MEDICARE

## 2022-10-07 VITALS
SYSTOLIC BLOOD PRESSURE: 126 MMHG | OXYGEN SATURATION: 99 % | DIASTOLIC BLOOD PRESSURE: 66 MMHG | RESPIRATION RATE: 20 BRPM | HEART RATE: 64 BPM | TEMPERATURE: 99 F | WEIGHT: 178.56 LBS | BODY MASS INDEX: 34 KG/M2

## 2022-10-07 DIAGNOSIS — R05.1 ACUTE COUGH: ICD-10-CM

## 2022-10-07 DIAGNOSIS — H66.92 LEFT OTITIS MEDIA, UNSPECIFIED OTITIS MEDIA TYPE: Primary | ICD-10-CM

## 2022-10-07 LAB — SARS-COV-2 RNA RESP QL NAA+PROBE: NOT DETECTED

## 2022-10-07 PROCEDURE — 99215 OFFICE O/P EST HI 40 MIN: CPT

## 2022-10-07 PROCEDURE — 94640 AIRWAY INHALATION TREATMENT: CPT

## 2022-10-07 PROCEDURE — 71046 X-RAY EXAM CHEST 2 VIEWS: CPT | Performed by: PHYSICIAN ASSISTANT

## 2022-10-07 PROCEDURE — 99214 OFFICE O/P EST MOD 30 MIN: CPT

## 2022-10-07 RX ORDER — IPRATROPIUM BROMIDE AND ALBUTEROL SULFATE 2.5; .5 MG/3ML; MG/3ML
3 SOLUTION RESPIRATORY (INHALATION) ONCE
Status: COMPLETED | OUTPATIENT
Start: 2022-10-07 | End: 2022-10-07

## 2022-10-07 RX ORDER — ALBUTEROL SULFATE 2.5 MG/3ML
2.5 SOLUTION RESPIRATORY (INHALATION) ONCE
Status: COMPLETED | OUTPATIENT
Start: 2022-10-07 | End: 2022-10-07

## 2022-10-07 RX ORDER — PREDNISONE 20 MG/1
40 TABLET ORAL DAILY
Qty: 10 TABLET | Refills: 0 | Status: SHIPPED | OUTPATIENT
Start: 2022-10-08 | End: 2022-10-13

## 2022-10-07 RX ORDER — AZITHROMYCIN 250 MG/1
TABLET, FILM COATED ORAL
Qty: 6 TABLET | Refills: 0 | Status: SHIPPED | OUTPATIENT
Start: 2022-10-07 | End: 2022-10-12

## 2022-10-07 RX ORDER — PREDNISONE 20 MG/1
40 TABLET ORAL ONCE
Status: COMPLETED | OUTPATIENT
Start: 2022-10-07 | End: 2022-10-07

## 2022-10-07 RX ORDER — ALBUTEROL SULFATE 90 UG/1
2 AEROSOL, METERED RESPIRATORY (INHALATION) EVERY 4 HOURS PRN
Qty: 1 EACH | Refills: 0 | Status: SHIPPED | OUTPATIENT
Start: 2022-10-07 | End: 2022-11-06

## 2022-10-07 NOTE — TELEPHONE ENCOUNTER
Cough since Tuesday, was around ill grand daughter. Patient states she did have fever Tuesday, but not since. Phlegm present which can make her feel SOB at times. No baseline sob. Chest pain when coughing only. This morning she feels symptoms runny nose present with headache. At home covid testing neg. Taking delsym otc with minimal improvement. Eating has decreased, drinking ok. Ongoing with no improvement. Patient wanting to be seen today if possible for evaluation as she states her symptoms are concerning her. Advised to be seen in [facility] for eval and imaging if needed. She will proceed to [facility] and call with any follow up if needed.  QUEENIE MUNIZ

## 2022-10-07 NOTE — ED INITIAL ASSESSMENT (HPI)
Pt had a fever Tuesday, now cough and keith, pt's grandaughter had same s/s, 2 neg covid tests at home

## 2022-10-07 NOTE — TELEPHONE ENCOUNTER
Pt called stating she has bad cough since last Tuesday-a lot of  Phlegm-chest hurts when coughing-covid test taken yesterday was negative

## 2022-10-14 ENCOUNTER — OFFICE VISIT (OUTPATIENT)
Dept: INTERNAL MEDICINE CLINIC | Facility: CLINIC | Age: 72
End: 2022-10-14
Payer: MEDICARE

## 2022-10-14 VITALS
DIASTOLIC BLOOD PRESSURE: 70 MMHG | RESPIRATION RATE: 20 BRPM | WEIGHT: 177 LBS | BODY MASS INDEX: 33.42 KG/M2 | OXYGEN SATURATION: 98 % | HEIGHT: 61 IN | HEART RATE: 69 BPM | SYSTOLIC BLOOD PRESSURE: 126 MMHG | TEMPERATURE: 97 F

## 2022-10-14 DIAGNOSIS — J20.9 ACUTE BRONCHITIS DUE TO INFECTION: Primary | ICD-10-CM

## 2022-10-14 DIAGNOSIS — E78.00 PURE HYPERCHOLESTEROLEMIA: ICD-10-CM

## 2022-10-14 DIAGNOSIS — M54.9 BACK PAIN WITH RADIATION: ICD-10-CM

## 2022-10-14 DIAGNOSIS — R14.0 ABDOMINAL BLOATING: ICD-10-CM

## 2022-10-14 PROBLEM — J44.9 CHRONIC OBSTRUCTIVE PULMONARY DISEASE, UNSPECIFIED (HCC): Status: ACTIVE | Noted: 2022-10-14

## 2022-10-14 PROCEDURE — 1126F AMNT PAIN NOTED NONE PRSNT: CPT | Performed by: INTERNAL MEDICINE

## 2022-10-14 PROCEDURE — 99214 OFFICE O/P EST MOD 30 MIN: CPT | Performed by: INTERNAL MEDICINE

## 2022-10-14 RX ORDER — BUDESONIDE AND FORMOTEROL FUMARATE DIHYDRATE 80; 4.5 UG/1; UG/1
2 AEROSOL RESPIRATORY (INHALATION) 2 TIMES DAILY
Qty: 10.2 G | Refills: 1 | Status: SHIPPED | OUTPATIENT
Start: 2022-10-14

## 2022-10-14 RX ORDER — CODEINE PHOSPHATE AND GUAIFENESIN 10; 100 MG/5ML; MG/5ML
10 SOLUTION ORAL NIGHTLY PRN
Qty: 180 ML | Refills: 0 | Status: SHIPPED | OUTPATIENT
Start: 2022-10-14 | End: 2022-10-24

## 2022-10-18 ENCOUNTER — TELEPHONE (OUTPATIENT)
Dept: INTERNAL MEDICINE CLINIC | Facility: CLINIC | Age: 72
End: 2022-10-18

## 2022-10-18 NOTE — TELEPHONE ENCOUNTER
Future Appointments   Date Time Provider Carter Joaquini   12/27/2022  8:40 AM Glory Car MD [facility]     Orders to   THE [facility]          aware must fast no call back required

## 2022-11-01 ENCOUNTER — LAB ENCOUNTER (OUTPATIENT)
Dept: LAB | Age: 72
End: 2022-11-01
Attending: INTERNAL MEDICINE
Payer: MEDICARE

## 2022-11-01 DIAGNOSIS — I10 ESSENTIAL HYPERTENSION: ICD-10-CM

## 2022-11-01 DIAGNOSIS — E78.00 PURE HYPERCHOLESTEROLEMIA: ICD-10-CM

## 2022-11-01 DIAGNOSIS — R63.5 WEIGHT GAIN: ICD-10-CM

## 2022-11-01 DIAGNOSIS — R14.0 ABDOMINAL BLOATING: ICD-10-CM

## 2022-11-01 LAB
ALBUMIN SERPL-MCNC: 3.5 G/DL (ref 3.4–5)
ALBUMIN/GLOB SERPL: 1.3 {RATIO} (ref 1–2)
ALP LIVER SERPL-CCNC: 96 U/L
ALT SERPL-CCNC: 23 U/L
ANION GAP SERPL CALC-SCNC: 7 MMOL/L (ref 0–18)
AST SERPL-CCNC: 19 U/L (ref 15–37)
BASOPHILS # BLD AUTO: 0.05 X10(3) UL (ref 0–0.2)
BASOPHILS NFR BLD AUTO: 1 %
BILIRUB SERPL-MCNC: 0.4 MG/DL (ref 0.1–2)
BUN BLD-MCNC: 21 MG/DL (ref 7–18)
BUN/CREAT SERPL: 23.9 (ref 10–20)
CALCIUM BLD-MCNC: 8.8 MG/DL (ref 8.5–10.1)
CHLORIDE SERPL-SCNC: 108 MMOL/L (ref 98–112)
CHOLEST SERPL-MCNC: 156 MG/DL (ref ?–200)
CO2 SERPL-SCNC: 25 MMOL/L (ref 21–32)
CREAT BLD-MCNC: 0.88 MG/DL
DEPRECATED RDW RBC AUTO: 51.1 FL (ref 35.1–46.3)
EOSINOPHIL # BLD AUTO: 0.7 X10(3) UL (ref 0–0.7)
EOSINOPHIL NFR BLD AUTO: 14.1 %
ERYTHROCYTE [DISTWIDTH] IN BLOOD BY AUTOMATED COUNT: 15.3 % (ref 11–15)
FASTING PATIENT LIPID ANSWER: YES
FASTING STATUS PATIENT QL REPORTED: YES
GFR SERPLBLD BASED ON 1.73 SQ M-ARVRAT: 69 ML/MIN/1.73M2 (ref 60–?)
GLOBULIN PLAS-MCNC: 2.6 G/DL (ref 2.8–4.4)
GLUCOSE BLD-MCNC: 98 MG/DL (ref 70–99)
HCT VFR BLD AUTO: 32.7 %
HDLC SERPL-MCNC: 57 MG/DL (ref 40–59)
HGB BLD-MCNC: 10.7 G/DL
IMM GRANULOCYTES # BLD AUTO: 0.01 X10(3) UL (ref 0–1)
IMM GRANULOCYTES NFR BLD: 0.2 %
LDLC SERPL CALC-MCNC: 76 MG/DL (ref ?–100)
LYMPHOCYTES # BLD AUTO: 1.78 X10(3) UL (ref 1–4)
LYMPHOCYTES NFR BLD AUTO: 36 %
MCH RBC QN AUTO: 30 PG (ref 26–34)
MCHC RBC AUTO-ENTMCNC: 32.7 G/DL (ref 31–37)
MCV RBC AUTO: 91.6 FL
MONOCYTES # BLD AUTO: 0.48 X10(3) UL (ref 0.1–1)
MONOCYTES NFR BLD AUTO: 9.7 %
NEUTROPHILS # BLD AUTO: 1.93 X10 (3) UL (ref 1.5–7.7)
NEUTROPHILS # BLD AUTO: 1.93 X10(3) UL (ref 1.5–7.7)
NEUTROPHILS NFR BLD AUTO: 39 %
NONHDLC SERPL-MCNC: 99 MG/DL (ref ?–130)
OSMOLALITY SERPL CALC.SUM OF ELEC: 293 MOSM/KG (ref 275–295)
PLATELET # BLD AUTO: 206 10(3)UL (ref 150–450)
POTASSIUM SERPL-SCNC: 4.5 MMOL/L (ref 3.5–5.1)
PROT SERPL-MCNC: 6.1 G/DL (ref 6.4–8.2)
RBC # BLD AUTO: 3.57 X10(6)UL
SODIUM SERPL-SCNC: 140 MMOL/L (ref 136–145)
T4 FREE SERPL-MCNC: 0.9 NG/DL (ref 0.8–1.7)
TRIGL SERPL-MCNC: 135 MG/DL (ref 30–149)
TSI SER-ACNC: 0.52 MIU/ML (ref 0.36–3.74)
VLDLC SERPL CALC-MCNC: 21 MG/DL (ref 0–30)
WBC # BLD AUTO: 5 X10(3) UL (ref 4–11)

## 2022-11-01 PROCEDURE — 36415 COLL VENOUS BLD VENIPUNCTURE: CPT

## 2022-11-01 PROCEDURE — 84443 ASSAY THYROID STIM HORMONE: CPT

## 2022-11-01 PROCEDURE — 80061 LIPID PANEL: CPT

## 2022-11-01 PROCEDURE — 85025 COMPLETE CBC W/AUTO DIFF WBC: CPT

## 2022-11-01 PROCEDURE — 80053 COMPREHEN METABOLIC PANEL: CPT

## 2022-11-01 PROCEDURE — 84439 ASSAY OF FREE THYROXINE: CPT

## 2022-11-16 ENCOUNTER — HOSPITAL ENCOUNTER (OUTPATIENT)
Dept: ULTRASOUND IMAGING | Age: 72
Discharge: HOME | End: 2022-11-16
Attending: INTERNAL MEDICINE
Payer: MEDICARE

## 2022-11-16 ENCOUNTER — APPOINTMENT (OUTPATIENT)
Dept: PHYSICAL THERAPY | Facility: HOSPITAL | Age: 72
End: 2022-11-16
Attending: INTERNAL MEDICINE
Payer: MEDICARE

## 2022-11-16 ENCOUNTER — HOSPITAL ENCOUNTER (OUTPATIENT)
Dept: MAMMOGRAPHY | Age: 72
Discharge: HOME | End: 2022-11-16
Attending: OBSTETRICS & GYNECOLOGY
Payer: MEDICARE

## 2022-11-16 DIAGNOSIS — R14.0 ABDOMINAL BLOATING: ICD-10-CM

## 2022-11-16 DIAGNOSIS — Z12.31 ENCOUNTER FOR SCREENING MAMMOGRAM FOR MALIGNANT NEOPLASM OF BREAST: ICD-10-CM

## 2022-11-16 PROCEDURE — 77063 BREAST TOMOSYNTHESIS BI: CPT | Performed by: OBSTETRICS & GYNECOLOGY

## 2022-11-16 PROCEDURE — 76700 US EXAM ABDOM COMPLETE: CPT | Performed by: INTERNAL MEDICINE

## 2022-11-16 PROCEDURE — 77067 SCR MAMMO BI INCL CAD: CPT | Performed by: OBSTETRICS & GYNECOLOGY

## 2022-11-17 ENCOUNTER — HOSPITAL ENCOUNTER (EMERGENCY)
Facility: HOSPITAL | Age: 72
Discharge: HOME | End: 2022-11-17
Attending: EMERGENCY MEDICINE
Payer: MEDICARE

## 2022-11-17 ENCOUNTER — APPOINTMENT (OUTPATIENT)
Dept: ULTRASOUND IMAGING | Facility: HOSPITAL | Age: 72
End: 2022-11-17
Payer: MEDICARE

## 2022-11-17 VITALS
RESPIRATION RATE: 18 BRPM | WEIGHT: 176 LBS | OXYGEN SATURATION: 99 % | HEART RATE: 75 BPM | SYSTOLIC BLOOD PRESSURE: 137 MMHG | DIASTOLIC BLOOD PRESSURE: 84 MMHG | BODY MASS INDEX: 32.39 KG/M2 | HEIGHT: 62 IN | TEMPERATURE: 98 F

## 2022-11-17 DIAGNOSIS — M25.561 ARTHRALGIA OF BOTH LOWER LEGS: ICD-10-CM

## 2022-11-17 DIAGNOSIS — R60.9 PERIPHERAL EDEMA: Primary | ICD-10-CM

## 2022-11-17 DIAGNOSIS — M25.562 ARTHRALGIA OF BOTH LOWER LEGS: ICD-10-CM

## 2022-11-17 PROCEDURE — 93970 EXTREMITY STUDY: CPT | Performed by: EMERGENCY MEDICINE

## 2022-11-17 PROCEDURE — 99284 EMERGENCY DEPT VISIT MOD MDM: CPT

## 2022-11-17 RX ORDER — HYDROCODONE BITARTRATE AND ACETAMINOPHEN 5; 325 MG/1; MG/1
1-2 TABLET ORAL EVERY 6 HOURS PRN
Qty: 20 TABLET | Refills: 0 | Status: SHIPPED | OUTPATIENT
Start: 2022-11-17

## 2022-11-19 ENCOUNTER — TELEPHONE (OUTPATIENT)
Dept: INTERNAL MEDICINE CLINIC | Facility: CLINIC | Age: 72
End: 2022-11-19

## 2022-11-19 DIAGNOSIS — R60.0 BILATERAL LEG EDEMA: Primary | ICD-10-CM

## 2022-11-30 ENCOUNTER — APPOINTMENT (OUTPATIENT)
Dept: PHYSICAL THERAPY | Facility: HOSPITAL | Age: 72
End: 2022-11-30
Attending: INTERNAL MEDICINE
Payer: MEDICARE

## 2022-11-30 DIAGNOSIS — M54.9 BACK PAIN WITH RADIATION: ICD-10-CM

## 2022-11-30 PROCEDURE — 97163 PT EVAL HIGH COMPLEX 45 MIN: CPT

## 2022-11-30 PROCEDURE — 97110 THERAPEUTIC EXERCISES: CPT

## 2022-12-05 ENCOUNTER — OFFICE VISIT (OUTPATIENT)
Dept: PHYSICAL THERAPY | Facility: HOSPITAL | Age: 72
End: 2022-12-05
Attending: INTERNAL MEDICINE
Payer: MEDICARE

## 2022-12-05 PROCEDURE — 97140 MANUAL THERAPY 1/> REGIONS: CPT

## 2022-12-05 PROCEDURE — 97110 THERAPEUTIC EXERCISES: CPT

## 2022-12-07 ENCOUNTER — APPOINTMENT (OUTPATIENT)
Dept: PHYSICAL THERAPY | Facility: HOSPITAL | Age: 72
End: 2022-12-07
Attending: INTERNAL MEDICINE
Payer: MEDICARE

## 2022-12-07 PROCEDURE — 97110 THERAPEUTIC EXERCISES: CPT

## 2022-12-07 PROCEDURE — 97140 MANUAL THERAPY 1/> REGIONS: CPT

## 2022-12-08 ENCOUNTER — LAB ENCOUNTER (OUTPATIENT)
Dept: LAB | Age: 72
End: 2022-12-08
Attending: INTERNAL MEDICINE
Payer: MEDICARE

## 2022-12-08 ENCOUNTER — HOSPITAL ENCOUNTER (OUTPATIENT)
Dept: CV DIAGNOSTICS | Facility: HOSPITAL | Age: 72
Discharge: HOME | End: 2022-12-08
Attending: INTERNAL MEDICINE
Payer: MEDICARE

## 2022-12-08 DIAGNOSIS — R60.0 BILATERAL LEG EDEMA: ICD-10-CM

## 2022-12-08 LAB
ANION GAP SERPL CALC-SCNC: 7 MMOL/L (ref 0–18)
BASOPHILS # BLD AUTO: 0.06 X10(3) UL (ref 0–0.2)
BASOPHILS NFR BLD AUTO: 1.1 %
BUN BLD-MCNC: 19 MG/DL (ref 7–18)
BUN/CREAT SERPL: 19.4 (ref 10–20)
CALCIUM BLD-MCNC: 9.4 MG/DL (ref 8.5–10.1)
CHLORIDE SERPL-SCNC: 107 MMOL/L (ref 98–112)
CO2 SERPL-SCNC: 26 MMOL/L (ref 21–32)
CREAT BLD-MCNC: 0.98 MG/DL
DEPRECATED RDW RBC AUTO: 55.3 FL (ref 35.1–46.3)
EOSINOPHIL # BLD AUTO: 0.45 X10(3) UL (ref 0–0.7)
EOSINOPHIL NFR BLD AUTO: 7.9 %
ERYTHROCYTE [DISTWIDTH] IN BLOOD BY AUTOMATED COUNT: 16.1 % (ref 11–15)
FASTING STATUS PATIENT QL REPORTED: YES
GFR SERPLBLD BASED ON 1.73 SQ M-ARVRAT: 61 ML/MIN/1.73M2 (ref 60–?)
GLUCOSE BLD-MCNC: 97 MG/DL (ref 70–99)
HCT VFR BLD AUTO: 35.3 %
HGB BLD-MCNC: 11.2 G/DL
IMM GRANULOCYTES # BLD AUTO: 0 X10(3) UL (ref 0–1)
IMM GRANULOCYTES NFR BLD: 0 %
LYMPHOCYTES # BLD AUTO: 1.89 X10(3) UL (ref 1–4)
LYMPHOCYTES NFR BLD AUTO: 33.3 %
MCH RBC QN AUTO: 29.6 PG (ref 26–34)
MCHC RBC AUTO-ENTMCNC: 31.7 G/DL (ref 31–37)
MCV RBC AUTO: 93.4 FL
MONOCYTES # BLD AUTO: 0.55 X10(3) UL (ref 0.1–1)
MONOCYTES NFR BLD AUTO: 9.7 %
NEUTROPHILS # BLD AUTO: 2.72 X10 (3) UL (ref 1.5–7.7)
NEUTROPHILS # BLD AUTO: 2.72 X10(3) UL (ref 1.5–7.7)
NEUTROPHILS NFR BLD AUTO: 48 %
OSMOLALITY SERPL CALC.SUM OF ELEC: 292 MOSM/KG (ref 275–295)
PLATELET # BLD AUTO: 183 10(3)UL (ref 150–450)
POTASSIUM SERPL-SCNC: 4.6 MMOL/L (ref 3.5–5.1)
RBC # BLD AUTO: 3.78 X10(6)UL
SODIUM SERPL-SCNC: 140 MMOL/L (ref 136–145)
WBC # BLD AUTO: 5.7 X10(3) UL (ref 4–11)

## 2022-12-08 PROCEDURE — 93306 TTE W/DOPPLER COMPLETE: CPT | Performed by: INTERNAL MEDICINE

## 2022-12-08 PROCEDURE — 85025 COMPLETE CBC W/AUTO DIFF WBC: CPT

## 2022-12-08 PROCEDURE — 80048 BASIC METABOLIC PNL TOTAL CA: CPT

## 2022-12-08 PROCEDURE — 36415 COLL VENOUS BLD VENIPUNCTURE: CPT

## 2022-12-12 ENCOUNTER — APPOINTMENT (OUTPATIENT)
Dept: PHYSICAL THERAPY | Facility: HOSPITAL | Age: 72
End: 2022-12-12
Attending: INTERNAL MEDICINE
Payer: MEDICARE

## 2022-12-14 ENCOUNTER — APPOINTMENT (OUTPATIENT)
Dept: PHYSICAL THERAPY | Facility: HOSPITAL | Age: 72
End: 2022-12-14
Attending: INTERNAL MEDICINE
Payer: MEDICARE

## 2022-12-14 PROCEDURE — 97140 MANUAL THERAPY 1/> REGIONS: CPT

## 2022-12-14 PROCEDURE — 97110 THERAPEUTIC EXERCISES: CPT

## 2022-12-19 ENCOUNTER — TELEPHONE (OUTPATIENT)
Dept: INTERNAL MEDICINE CLINIC | Facility: CLINIC | Age: 72
End: 2022-12-19

## 2022-12-19 ENCOUNTER — OFFICE VISIT (OUTPATIENT)
Dept: PHYSICAL THERAPY | Facility: HOSPITAL | Age: 72
End: 2022-12-19
Attending: INTERNAL MEDICINE
Payer: MEDICARE

## 2022-12-19 PROCEDURE — 97140 MANUAL THERAPY 1/> REGIONS: CPT

## 2022-12-19 PROCEDURE — 97110 THERAPEUTIC EXERCISES: CPT

## 2022-12-21 ENCOUNTER — TELEPHONE (OUTPATIENT)
Dept: PHYSICAL THERAPY | Facility: HOSPITAL | Age: 72
End: 2022-12-21

## 2022-12-22 ENCOUNTER — APPOINTMENT (OUTPATIENT)
Dept: PHYSICAL THERAPY | Facility: HOSPITAL | Age: 72
End: 2022-12-22
Attending: INTERNAL MEDICINE
Payer: MEDICARE

## 2022-12-27 ENCOUNTER — OFFICE VISIT (OUTPATIENT)
Dept: INTERNAL MEDICINE CLINIC | Facility: CLINIC | Age: 72
End: 2022-12-27
Payer: MEDICARE

## 2022-12-27 VITALS
DIASTOLIC BLOOD PRESSURE: 64 MMHG | HEART RATE: 68 BPM | SYSTOLIC BLOOD PRESSURE: 136 MMHG | RESPIRATION RATE: 18 BRPM | HEIGHT: 62 IN | OXYGEN SATURATION: 98 % | WEIGHT: 168 LBS | BODY MASS INDEX: 30.91 KG/M2

## 2022-12-27 DIAGNOSIS — N95.2 VAGINAL ATROPHY: ICD-10-CM

## 2022-12-27 DIAGNOSIS — Z12.11 SCREENING FOR COLON CANCER: ICD-10-CM

## 2022-12-27 DIAGNOSIS — Z98.890 STATUS POST RESECTION OF MENINGIOMA: ICD-10-CM

## 2022-12-27 DIAGNOSIS — E78.00 PURE HYPERCHOLESTEROLEMIA: ICD-10-CM

## 2022-12-27 DIAGNOSIS — Z86.018 STATUS POST RESECTION OF MENINGIOMA: ICD-10-CM

## 2022-12-27 DIAGNOSIS — Z00.00 ENCOUNTER FOR ANNUAL HEALTH EXAMINATION: Primary | ICD-10-CM

## 2022-12-27 DIAGNOSIS — M54.10 BACK PAIN WITH RADICULOPATHY: ICD-10-CM

## 2022-12-27 DIAGNOSIS — R60.0 BILATERAL LEG EDEMA: ICD-10-CM

## 2022-12-27 DIAGNOSIS — M48.061 SPINAL STENOSIS OF LUMBAR REGION, UNSPECIFIED WHETHER NEUROGENIC CLAUDICATION PRESENT: ICD-10-CM

## 2022-12-27 DIAGNOSIS — R42 VERTIGO: ICD-10-CM

## 2022-12-27 DIAGNOSIS — I10 PRIMARY HYPERTENSION: ICD-10-CM

## 2022-12-27 PROBLEM — J44.9 CHRONIC OBSTRUCTIVE PULMONARY DISEASE, UNSPECIFIED (HCC): Status: RESOLVED | Noted: 2022-10-14 | Resolved: 2022-12-27

## 2022-12-27 PROCEDURE — G0439 PPPS, SUBSEQ VISIT: HCPCS | Performed by: INTERNAL MEDICINE

## 2022-12-27 PROCEDURE — 1125F AMNT PAIN NOTED PAIN PRSNT: CPT | Performed by: INTERNAL MEDICINE

## 2022-12-27 RX ORDER — FUROSEMIDE 40 MG/1
40 TABLET ORAL DAILY
Qty: 90 TABLET | Refills: 3 | Status: SHIPPED | OUTPATIENT
Start: 2022-12-27

## 2022-12-27 RX ORDER — GABAPENTIN 100 MG/1
200 CAPSULE ORAL NIGHTLY
Qty: 60 CAPSULE | Refills: 0 | Status: SHIPPED | OUTPATIENT
Start: 2022-12-27

## 2022-12-27 NOTE — PROGRESS NOTES
MEDICARE PT - AWV Completed 12/27/2022  MAMMOGRAM - UTD Until 11/16/2023  COLONOSCOPY - Due Ordered 8/5/2022

## 2023-01-04 ENCOUNTER — OFFICE VISIT (OUTPATIENT)
Dept: PHYSICAL THERAPY | Facility: HOSPITAL | Age: 73
End: 2023-01-04
Attending: INTERNAL MEDICINE
Payer: MEDICARE

## 2023-01-04 PROCEDURE — 97110 THERAPEUTIC EXERCISES: CPT

## 2023-01-04 PROCEDURE — 97140 MANUAL THERAPY 1/> REGIONS: CPT

## 2023-01-09 ENCOUNTER — APPOINTMENT (OUTPATIENT)
Dept: PHYSICAL THERAPY | Facility: HOSPITAL | Age: 73
End: 2023-01-09
Attending: INTERNAL MEDICINE
Payer: MEDICARE

## 2023-01-12 ENCOUNTER — OFFICE VISIT (OUTPATIENT)
Dept: PHYSICAL THERAPY | Facility: HOSPITAL | Age: 73
End: 2023-01-12
Attending: INTERNAL MEDICINE
Payer: MEDICARE

## 2023-01-12 PROCEDURE — 97110 THERAPEUTIC EXERCISES: CPT

## 2023-01-12 PROCEDURE — 97140 MANUAL THERAPY 1/> REGIONS: CPT

## 2023-01-18 ENCOUNTER — OFFICE VISIT (OUTPATIENT)
Dept: PHYSICAL THERAPY | Facility: HOSPITAL | Age: 73
End: 2023-01-18
Attending: INTERNAL MEDICINE
Payer: MEDICARE

## 2023-01-18 PROCEDURE — 97110 THERAPEUTIC EXERCISES: CPT

## 2023-01-18 PROCEDURE — 97140 MANUAL THERAPY 1/> REGIONS: CPT

## 2023-01-21 DIAGNOSIS — M54.10 BACK PAIN WITH RADICULOPATHY: ICD-10-CM

## 2023-01-23 ENCOUNTER — OFFICE VISIT (OUTPATIENT)
Dept: PHYSICAL THERAPY | Facility: HOSPITAL | Age: 73
End: 2023-01-23
Attending: INTERNAL MEDICINE
Payer: MEDICARE

## 2023-01-23 PROCEDURE — 97140 MANUAL THERAPY 1/> REGIONS: CPT

## 2023-01-23 PROCEDURE — 97110 THERAPEUTIC EXERCISES: CPT

## 2023-01-23 RX ORDER — GABAPENTIN 100 MG/1
CAPSULE ORAL
Qty: 60 CAPSULE | Refills: 2 | Status: SHIPPED | OUTPATIENT
Start: 2023-01-23

## 2023-01-25 ENCOUNTER — APPOINTMENT (OUTPATIENT)
Dept: PHYSICAL THERAPY | Facility: HOSPITAL | Age: 73
End: 2023-01-25
Attending: INTERNAL MEDICINE
Payer: MEDICARE

## 2023-01-27 DIAGNOSIS — G89.29 OTHER CHRONIC PAIN: ICD-10-CM

## 2023-01-30 ENCOUNTER — OFFICE VISIT (OUTPATIENT)
Dept: PHYSICAL THERAPY | Facility: HOSPITAL | Age: 73
End: 2023-01-30
Attending: INTERNAL MEDICINE
Payer: MEDICARE

## 2023-01-30 PROCEDURE — 97110 THERAPEUTIC EXERCISES: CPT

## 2023-01-30 PROCEDURE — 97140 MANUAL THERAPY 1/> REGIONS: CPT

## 2023-01-30 RX ORDER — DULOXETIN HYDROCHLORIDE 60 MG/1
CAPSULE, DELAYED RELEASE ORAL
Qty: 90 CAPSULE | Refills: 1 | Status: SHIPPED | OUTPATIENT
Start: 2023-01-30

## 2023-02-01 ENCOUNTER — OFFICE VISIT (OUTPATIENT)
Dept: PHYSICAL THERAPY | Facility: HOSPITAL | Age: 73
End: 2023-02-01
Attending: INTERNAL MEDICINE
Payer: MEDICARE

## 2023-02-01 PROCEDURE — 97110 THERAPEUTIC EXERCISES: CPT

## 2023-02-01 PROCEDURE — 97112 NEUROMUSCULAR REEDUCATION: CPT

## 2023-02-01 PROCEDURE — 97140 MANUAL THERAPY 1/> REGIONS: CPT

## 2023-02-06 ENCOUNTER — OFFICE VISIT (OUTPATIENT)
Dept: PHYSICAL THERAPY | Facility: HOSPITAL | Age: 73
End: 2023-02-06
Attending: INTERNAL MEDICINE
Payer: MEDICARE

## 2023-02-06 PROCEDURE — 97112 NEUROMUSCULAR REEDUCATION: CPT

## 2023-02-06 PROCEDURE — 97110 THERAPEUTIC EXERCISES: CPT

## 2023-02-08 ENCOUNTER — OFFICE VISIT (OUTPATIENT)
Dept: PHYSICAL THERAPY | Facility: HOSPITAL | Age: 73
End: 2023-02-08
Attending: INTERNAL MEDICINE
Payer: MEDICARE

## 2023-02-08 PROCEDURE — 97110 THERAPEUTIC EXERCISES: CPT

## 2023-02-08 PROCEDURE — 97112 NEUROMUSCULAR REEDUCATION: CPT

## 2023-02-13 ENCOUNTER — OFFICE VISIT (OUTPATIENT)
Dept: PHYSICAL THERAPY | Facility: HOSPITAL | Age: 73
End: 2023-02-13
Attending: INTERNAL MEDICINE
Payer: MEDICARE

## 2023-02-13 PROCEDURE — 97110 THERAPEUTIC EXERCISES: CPT

## 2023-02-13 PROCEDURE — 97112 NEUROMUSCULAR REEDUCATION: CPT

## 2023-02-15 ENCOUNTER — APPOINTMENT (OUTPATIENT)
Dept: PHYSICAL THERAPY | Facility: HOSPITAL | Age: 73
End: 2023-02-15
Attending: INTERNAL MEDICINE
Payer: MEDICARE

## 2023-02-20 ENCOUNTER — OFFICE VISIT (OUTPATIENT)
Dept: PHYSICAL THERAPY | Facility: HOSPITAL | Age: 73
End: 2023-02-20
Attending: INTERNAL MEDICINE
Payer: MEDICARE

## 2023-02-20 PROCEDURE — 97110 THERAPEUTIC EXERCISES: CPT

## 2023-02-20 PROCEDURE — 97112 NEUROMUSCULAR REEDUCATION: CPT

## 2023-02-23 ENCOUNTER — OFFICE VISIT (OUTPATIENT)
Dept: PHYSICAL THERAPY | Facility: HOSPITAL | Age: 73
End: 2023-02-23
Attending: INTERNAL MEDICINE
Payer: MEDICARE

## 2023-02-23 PROCEDURE — 97110 THERAPEUTIC EXERCISES: CPT

## 2023-02-23 PROCEDURE — 97112 NEUROMUSCULAR REEDUCATION: CPT

## 2023-02-27 ENCOUNTER — TELEPHONE (OUTPATIENT)
Dept: PAIN CLINIC | Facility: CLINIC | Age: 73
End: 2023-02-27

## 2023-02-27 ENCOUNTER — OFFICE VISIT (OUTPATIENT)
Dept: PAIN CLINIC | Facility: CLINIC | Age: 73
End: 2023-02-27
Payer: MEDICARE

## 2023-02-27 VITALS
SYSTOLIC BLOOD PRESSURE: 110 MMHG | HEART RATE: 65 BPM | DIASTOLIC BLOOD PRESSURE: 80 MMHG | OXYGEN SATURATION: 98 % | BODY MASS INDEX: 31 KG/M2 | WEIGHT: 168 LBS

## 2023-02-27 DIAGNOSIS — M48.062 SPINAL STENOSIS OF LUMBAR REGION WITH NEUROGENIC CLAUDICATION: Primary | ICD-10-CM

## 2023-02-27 DIAGNOSIS — M17.0 PRIMARY OSTEOARTHRITIS OF BOTH KNEES: Primary | ICD-10-CM

## 2023-02-27 DIAGNOSIS — M17.0 PRIMARY OSTEOARTHRITIS OF BOTH KNEES: ICD-10-CM

## 2023-02-27 DIAGNOSIS — M48.062 SPINAL STENOSIS OF LUMBAR REGION WITH NEUROGENIC CLAUDICATION: ICD-10-CM

## 2023-02-27 PROCEDURE — 99204 OFFICE O/P NEW MOD 45 MIN: CPT | Performed by: ANESTHESIOLOGY

## 2023-02-27 RX ORDER — NETARSUDIL 0.2 MG/ML
1 SOLUTION/ DROPS OPHTHALMIC; TOPICAL NIGHTLY
COMMUNITY
Start: 2023-02-12

## 2023-02-27 NOTE — TELEPHONE ENCOUNTER
Question Answer   Anesthesia Type Local   Provider [facility] Procedure Lab   CPT (Hit enter after each entry) DRAIN/INJECT LARGE JOINT/BURSA    ([facility] ONLY) SYNVISC ONE 6 ML   Procedure Modifier ;BILATERAL PROCEDURE   Medical clearance requested (will send to Pain Navigator) No   Patient has Medicare coverage?  Yes   Comments (Please list entire procedure name here.) bilateral knee injections with synvisc

## 2023-02-27 NOTE — TELEPHONE ENCOUNTER
Question Answer   Anesthesia Type Local   Provider [facility] Procedure Lab   Procedure Lumbar MAIKOL   CPT (Hit enter after each entry) NJX INTERLAMINAR LMBR/SAC   Medical clearance requested (will send to Pain Navigator) No   Patient has Medicare coverage?  Yes   Comments (Please list entire procedure name here.) lumbar epidural steroid injection

## 2023-03-01 ENCOUNTER — OFFICE VISIT (OUTPATIENT)
Dept: PHYSICAL THERAPY | Facility: HOSPITAL | Age: 73
End: 2023-03-01
Attending: INTERNAL MEDICINE
Payer: MEDICARE

## 2023-03-01 PROCEDURE — 97110 THERAPEUTIC EXERCISES: CPT

## 2023-03-01 PROCEDURE — 97112 NEUROMUSCULAR REEDUCATION: CPT

## 2023-03-06 ENCOUNTER — OFFICE VISIT (OUTPATIENT)
Dept: PHYSICAL THERAPY | Facility: HOSPITAL | Age: 73
End: 2023-03-06
Attending: INTERNAL MEDICINE
Payer: MEDICARE

## 2023-03-06 DIAGNOSIS — E78.00 PURE HYPERCHOLESTEROLEMIA: ICD-10-CM

## 2023-03-06 PROCEDURE — 97110 THERAPEUTIC EXERCISES: CPT

## 2023-03-06 PROCEDURE — 97112 NEUROMUSCULAR REEDUCATION: CPT

## 2023-03-06 RX ORDER — ATORVASTATIN CALCIUM 10 MG/1
TABLET, FILM COATED ORAL
Qty: 90 TABLET | Refills: 0 | Status: SHIPPED | OUTPATIENT
Start: 2023-03-06

## 2023-03-06 NOTE — TELEPHONE ENCOUNTER
PASSED per protocol, refill sent.   Last PE 12.27.22  Future Appointments   Date Time Provider Carter Huang   3/9/2023 11:45 AM Izzy Bailey, PT [address] [facility] PHYS [facility] AT [facility]   3/13/2023 11:00 AM Izzy Bailey, PT [address] [facility] PHYS [facility] AT [facility]   3/16/2023 11:30 AM Izzy Bailey, PT [address] [facility] PHYS [facility] AT [facility]   3/20/2023 11:00 AM Izzy Bailey, PT [address] [facility] PHYS [facility] AT [facility]   3/23/2023 11:30 AM Izzy Bailey, PT [address] [facility] PHYS [address]   3/28/2023 10:40 AM Nahid Cortes MD [facility]   4/4/2023 10:15 AM Izzy Bailey, PT [address] [facility] PHYS [facility] AT [facility]   4/5/2023 10:30 AM Yanelis Du MD [facility] [place]   4/6/2023 11:15 AM Izzy Bailey, PT [address] [facility] PHYS [address]

## 2023-03-09 ENCOUNTER — OFFICE VISIT (OUTPATIENT)
Dept: PHYSICAL THERAPY | Facility: HOSPITAL | Age: 73
End: 2023-03-09
Attending: INTERNAL MEDICINE
Payer: MEDICARE

## 2023-03-09 PROCEDURE — 97110 THERAPEUTIC EXERCISES: CPT

## 2023-03-09 PROCEDURE — 97112 NEUROMUSCULAR REEDUCATION: CPT

## 2023-03-13 ENCOUNTER — OFFICE VISIT (OUTPATIENT)
Dept: PHYSICAL THERAPY | Facility: HOSPITAL | Age: 73
End: 2023-03-13
Attending: INTERNAL MEDICINE
Payer: MEDICARE

## 2023-03-13 PROCEDURE — 97112 NEUROMUSCULAR REEDUCATION: CPT

## 2023-03-13 PROCEDURE — 97110 THERAPEUTIC EXERCISES: CPT

## 2023-03-14 ENCOUNTER — HOSPITAL ENCOUNTER (OUTPATIENT)
Facility: HOSPITAL | Age: 73
Setting detail: HOSPITAL OUTPATIENT SURGERY
Discharge: HOME | End: 2023-03-14
Attending: ANESTHESIOLOGY | Admitting: ANESTHESIOLOGY
Payer: MEDICARE

## 2023-03-14 ENCOUNTER — APPOINTMENT (OUTPATIENT)
Dept: GENERAL RADIOLOGY | Facility: HOSPITAL | Age: 73
End: 2023-03-14
Attending: ANESTHESIOLOGY
Payer: MEDICARE

## 2023-03-14 VITALS
WEIGHT: 168 LBS | SYSTOLIC BLOOD PRESSURE: 119 MMHG | OXYGEN SATURATION: 100 % | DIASTOLIC BLOOD PRESSURE: 64 MMHG | RESPIRATION RATE: 18 BRPM | HEIGHT: 62 IN | HEART RATE: 67 BPM | TEMPERATURE: 98 F | BODY MASS INDEX: 30.91 KG/M2

## 2023-03-14 PROCEDURE — 77002 NEEDLE LOCALIZATION BY XRAY: CPT | Performed by: ANESTHESIOLOGY

## 2023-03-14 PROCEDURE — 0S993ZZ DRAINAGE OF RIGHT HIP JOINT, PERCUTANEOUS APPROACH: ICD-10-PCS | Performed by: ANESTHESIOLOGY

## 2023-03-14 PROCEDURE — 0S9D3ZZ DRAINAGE OF LEFT KNEE JOINT, PERCUTANEOUS APPROACH: ICD-10-PCS | Performed by: ANESTHESIOLOGY

## 2023-03-14 PROCEDURE — BQ171ZZ FLUOROSCOPY OF RIGHT KNEE USING LOW OSMOLAR CONTRAST: ICD-10-PCS | Performed by: ANESTHESIOLOGY

## 2023-03-14 PROCEDURE — 3E0U3GC INTRODUCTION OF OTHER THERAPEUTIC SUBSTANCE INTO JOINTS, PERCUTANEOUS APPROACH: ICD-10-PCS | Performed by: ANESTHESIOLOGY

## 2023-03-14 PROCEDURE — 20610 DRAIN/INJ JOINT/BURSA W/O US: CPT | Performed by: ANESTHESIOLOGY

## 2023-03-14 PROCEDURE — BQ181ZZ FLUOROSCOPY OF LEFT KNEE USING LOW OSMOLAR CONTRAST: ICD-10-PCS | Performed by: ANESTHESIOLOGY

## 2023-03-14 RX ORDER — LIDOCAINE HYDROCHLORIDE 10 MG/ML
INJECTION, SOLUTION EPIDURAL; INFILTRATION; INTRACAUDAL; PERINEURAL
Status: DISCONTINUED | OUTPATIENT
Start: 2023-03-14 | End: 2023-03-14

## 2023-03-14 NOTE — OPERATIVE REPORT
[facility]  Operative Report  3/14/2023     Mariam Higgins Patient Status:  Hospital Outpatient Surgery    3/27/1950 MRN [id number]   Location [id number] [place] [address] Attending Khang Arriaga MD   Hosp Day # 0 PCP Tutu Martinez MD     Indication: Rebeca Owens is a 67year old female with chronic knee pain    Imaging: Knee x-ray reviewed there is tricompartmental disease    Preoperative Diagnosis:  Primary osteoarthritis of both knees [M17.0]    Postoperative Diagnosis: Same as above. Procedure performed: SYNVISC INJECTION OF BILATERAL KNEE JOINT with local     Anesthesia: Local      EBL: Less than 1 ml. Procedure Description:   After reviewing the patient's history and performing a focused physical examination, the diagnosis was confirmed and contraindications such as infection and coagulopathy were ruled out. Following review of potential side effects and complications, including but not necessarily limited to infection, allergic reaction, local tissue breakdown, nerve injury, and paresis, the patient indicated they understood and agreed to proceed. After obtaining the informed consent, the patient was brought to the procedure room and monitored. The patient was brought to the procedure room and positioned supine with affected knee supported. Fluoroscopy of the right knee joint was taken in AP view. The lateral aspect of the patellar tendon was marked. A skin well was raised with 1% lidocaine. Subsequently a 22-gauge 3.5 Quincke spinal needle was introduced under direct fluoroscopy under AP view. The needle's position was confirmed by AP and lateral fluoroscopic view. Thereafter, 1 mL Omnipaque 180 was injected. After a good arthrogram was obtained, 2 mL of Synvisc was injected after repeated aspiration. The needle was then flushed with 1 mL 1% lidocaine. The similar procedure was repeated on the other side.   The patient tolerated the procedure very well and was discharged home after recovery criteria were met. Complications: None. Follow up:  In clinic as needed    Angelina Lyn MD

## 2023-03-14 NOTE — DISCHARGE INSTRUCTIONS
Home Care Instructions Following Your Pain Procedure     Glenn,  It has been a pleasure to have you as our patient. To help you at home, you must follow these general discharge instructions. We will review these with you before you are discharged. It is our hope that you have a complete and uneventful recovery from our procedure. General Instructions:  What to Expect:  Bandages from your procedure today can be removed when you get home. Please avoid soaking and/or swimming for 24 hours. Showering is okay  It is normal to have increased pain symptoms and/or pain at injection site for up to 3-5 days after procedure, you can use heat or ice (20 minutes on 20 minutes off) for comfort. You may experience some temporary side effects which may include restlessness or insomnia, flushing of the face, or heart palpitations. Please contact the provider if these symptoms do not resolve within 3-4 days. Lightheadedness or nausea may occur and should resolve within 24 to 48 hours. If you develop a headache after treatment, rest, drink fluids (with caffeine, if possible) and take mild over-the-counter pain medication. If the headache does not improve with the above treatment, contact the physician. Home Medications:  Resume all previously prescribed medication. Please avoid taking NSAIDs (Non-Steriodal Anti-Inflammatory Drugs) such as:  Ibuprofen ( Advil, Motrin) Aleve (Naproxen), Diclofenac, Meloxicam for 6 hours after procedure. If you are on Coumadin (Warfarin) or any other anti-coagulant (or \"blood thinning\") medication such as Plavix (Clopidogrel), Xarelto (Rivaroxaban), Eliquis (Apixaban), Effient (Prasugrel) etc., restart on the following day from the procedure unless otherwise directed by your provider. If you are a diabetic, please increase the frequency of your glucose monitoring after the procedure as steroids may cause a temporary (2-3 day) increase in your blood sugar.   Contact your primary care physician if your blood sugar remains elevated as you may require some medication adjustment. Diet:  Resume your regular diet as tolerated. Activity: We recommend that you relax and rest during the rest of your procedure day. If you feel weakness in your arms or legs do not drive. Follow-up Appointment  Please schedule a follow-up visit within 3 to 4 weeks after your last procedure date. Question or Concerns:  Feel free to call our office with any questions or concerns at [phone] (option #2)    Glenn  Thank you for coming to [facility] for your procedure. The nurses try very hard to make sure you receive the best care possible. Your trust in them as well as us is greatly appreciated.     Thanks so much,   Dr. Claudette Chapman

## 2023-03-15 ENCOUNTER — TELEPHONE (OUTPATIENT)
Dept: PAIN CLINIC | Facility: CLINIC | Age: 73
End: 2023-03-15

## 2023-03-15 NOTE — TELEPHONE ENCOUNTER
Follow-up call post pain procedure. Left message informing patient to contact the pain clinic at [phone] option #2 regarding any questions or concerns about recent pain procedure.     Procedure: SYNVISC INJECTION OF BILATERAL KNEE JOINT with local  Date: 3/14/23  Follow up Visit Scheduled: 4/5/23

## 2023-03-16 ENCOUNTER — APPOINTMENT (OUTPATIENT)
Dept: PHYSICAL THERAPY | Facility: HOSPITAL | Age: 73
End: 2023-03-16
Attending: INTERNAL MEDICINE
Payer: MEDICARE

## 2023-03-16 ENCOUNTER — TELEPHONE (OUTPATIENT)
Dept: PHYSICAL THERAPY | Facility: HOSPITAL | Age: 73
End: 2023-03-16

## 2023-03-20 ENCOUNTER — APPOINTMENT (OUTPATIENT)
Dept: PHYSICAL THERAPY | Facility: HOSPITAL | Age: 73
End: 2023-03-20
Payer: MEDICARE

## 2023-03-21 ENCOUNTER — HOSPITAL ENCOUNTER (OUTPATIENT)
Facility: HOSPITAL | Age: 73
Setting detail: HOSPITAL OUTPATIENT SURGERY
Discharge: HOME | End: 2023-03-21
Attending: ANESTHESIOLOGY | Admitting: ANESTHESIOLOGY
Payer: MEDICARE

## 2023-03-21 ENCOUNTER — APPOINTMENT (OUTPATIENT)
Dept: GENERAL RADIOLOGY | Facility: HOSPITAL | Age: 73
End: 2023-03-21
Attending: ANESTHESIOLOGY
Payer: MEDICARE

## 2023-03-21 VITALS
RESPIRATION RATE: 18 BRPM | OXYGEN SATURATION: 99 % | TEMPERATURE: 98 F | HEIGHT: 62 IN | SYSTOLIC BLOOD PRESSURE: 145 MMHG | DIASTOLIC BLOOD PRESSURE: 71 MMHG | HEART RATE: 56 BPM | WEIGHT: 168 LBS | BODY MASS INDEX: 30.91 KG/M2

## 2023-03-21 PROCEDURE — 62323 NJX INTERLAMINAR LMBR/SAC: CPT | Performed by: ANESTHESIOLOGY

## 2023-03-21 PROCEDURE — 3E0R33Z INTRODUCTION OF ANTI-INFLAMMATORY INTO SPINAL CANAL, PERCUTANEOUS APPROACH: ICD-10-PCS | Performed by: ANESTHESIOLOGY

## 2023-03-21 RX ORDER — DEXAMETHASONE SODIUM PHOSPHATE 10 MG/ML
INJECTION, SOLUTION INTRAMUSCULAR; INTRAVENOUS
Status: DISCONTINUED | OUTPATIENT
Start: 2023-03-21 | End: 2023-03-21

## 2023-03-21 RX ORDER — SODIUM CHLORIDE 9 MG/ML
INJECTION INTRAVENOUS
Status: DISCONTINUED | OUTPATIENT
Start: 2023-03-21 | End: 2023-03-21

## 2023-03-21 RX ORDER — LIDOCAINE HYDROCHLORIDE 10 MG/ML
INJECTION, SOLUTION EPIDURAL; INFILTRATION; INTRACAUDAL; PERINEURAL
Status: DISCONTINUED | OUTPATIENT
Start: 2023-03-21 | End: 2023-03-21

## 2023-03-21 NOTE — DISCHARGE INSTRUCTIONS
Home Care Instructions Following Your Pain Procedure     Glenn,  It has been a pleasure to have you as our patient. To help you at home, you must follow these general discharge instructions. We will review these with you before you are discharged. It is our hope that you have a complete and uneventful recovery from our procedure. General Instructions:  What to Expect:  Bandages from your procedure today can be removed when you get home. Please avoid soaking and/or swimming for 24 hours. Showering is okay  It is normal to have increased pain symptoms and/or pain at injection site for up to 3-5 days after procedure, you can use heat or ice (20 minutes on 20 minutes off) for comfort. You may experience some temporary side effects which may include restlessness or insomnia, flushing of the face, or heart palpitations. Please contact the provider if these symptoms do not resolve within 3-4 days. Lightheadedness or nausea may occur and should resolve within 24 to 48 hours. If you develop a headache after treatment, rest, drink fluids (with caffeine, if possible) and take mild over-the-counter pain medication. If the headache does not improve with the above treatment, contact the physician. Home Medications:  Resume all previously prescribed medication. Please avoid taking NSAIDs (Non-Steriodal Anti-Inflammatory Drugs) such as:  Ibuprofen ( Advil, Motrin) Aleve (Naproxen), Diclofenac, Meloxicam for 6 hours after procedure. If you are on Coumadin (Warfarin) or any other anti-coagulant (or \"blood thinning\") medication such as Plavix (Clopidogrel), Xarelto (Rivaroxaban), Eliquis (Apixaban), Effient (Prasugrel) etc., restart on the following day from the procedure unless otherwise directed by your provider. If you are a diabetic, please increase the frequency of your glucose monitoring after the procedure as steroids may cause a temporary (2-3 day) increase in your blood sugar.   Contact your primary care physician if your blood sugar remains elevated as you may require some medication adjustment. Diet:  Resume your regular diet as tolerated. Activity: We recommend that you relax and rest during the rest of your procedure day. If you feel weakness in your arms or legs do not drive. Follow-up Appointment  Please schedule a follow-up visit within 3 to 4 weeks after your last procedure date. Question or Concerns:  Feel free to call our office with any questions or concerns at [phone] (option #2)    Glenn  Thank you for coming to [facility] for your procedure. The nurses try very hard to make sure you receive the best care possible. Your trust in them as well as us is greatly appreciated.     Thanks so much,   Dr. Gerry Tadeo

## 2023-03-21 NOTE — OPERATIVE REPORT
[facility]  Operative Report  3/21/2023     [facility] Patient Status:  Hospital Outpatient Surgery    3/27/1950 MRN [id number]   Location [id number] [place] [address] Attending Shruti Hernández MD   Hosp Day # 0 PCP Gibson Hanks MD     Indication: Claudia Rust is a 67year old female with lumbar spinal stenosis    Imaging: MRI reviewed    Preoperative Diagnosis:  Spinal stenosis of lumbar region with neurogenic claudication [M48.062]    Postoperative Diagnosis: Same as above. Procedure performed: LUMBAR INTERLAMINAR EPIDURAL INJECTION with local     Anesthesia: Local     EBL: Less than 1 ml. Procedure Description:  After reviewing the patient's history and performing a focused physical examination, the diagnosis and positive previous diagnostic tests were confirmed and contraindications such as infection and coagulopathy were ruled out. Following review of allergies and potential side effects and complications, including but not necessarily limited to infection, allergic reaction, local tissue breakdown, nerve injury, post-dural puncture headache and paresis, the patient consented for the procedure. The patient was brought to the procedure room in prone position. After sterile prep of the lumbar spine, the L5-S1 interspace was identified with the help of fluoroscopy. Local anesthetic was given by a 25 gauge 1.5 inch needle with 1% lidocaine in that space level. Thereafter, a 20 gauge Tuohy needle was introduced and advanced under fluoroscopy. The epidural space was accessed by using loss of resistance to air technique. The needle position was confirmed with AP and lateral view under fluoroscopy. Omnipaque 180 was injected in 1 mL volume. A good epidurogram was obtained. Thereafter, dexamethasone 10 mg with normal saline of 5 mL in total volume of 6 mL was injected through the Tuohy needle. The needle was flushed with 1 mL lidocaine.   The needle was withdrawn with the stylet intact in situ. The needle's tip was intact. The patient tolerated the procedure very well without significant immediate complication. The patient's back was cleaned and sterile dressing was applied. The patient was discharged with an instruction to a responsible adult after discharge criteria were met. The patient was advised to contact us should any problems happen. The patient was also informed to go to the emergency room immediately if experiencing severe numbness or weakness in the extremities or experiencing bowel or bladder incontinence. Complications: None. Follow up: The patient was followed in the pain clinic as needed basis.           Angelina Lyn MD

## 2023-03-22 ENCOUNTER — TELEPHONE (OUTPATIENT)
Dept: PAIN CLINIC | Facility: CLINIC | Age: 73
End: 2023-03-22

## 2023-03-22 NOTE — TELEPHONE ENCOUNTER
Courtesy called placed to patient for post procedure follow up. Patient stated feeling fine. Informed patient that soreness is to be expected after the procedure. Educated patient that it takes 3-5 days for the steroid to be effective and to allow adequate time for medication to work. Encouraged patient to alternate ice and heat and to take medications as prescribed. Pt verbalized understanding to call with any questions or concerns.       Procedure: LUMBAR INTERLAMINAR EPIDURAL INJECTION with local  Date: 03/21/23  Follow up Visit Scheduled: 04/05/23

## 2023-03-23 ENCOUNTER — APPOINTMENT (OUTPATIENT)
Dept: PHYSICAL THERAPY | Facility: HOSPITAL | Age: 73
End: 2023-03-23
Payer: MEDICARE

## 2023-03-28 ENCOUNTER — TELEMEDICINE (OUTPATIENT)
Dept: INTERNAL MEDICINE CLINIC | Facility: CLINIC | Age: 73
End: 2023-03-28
Payer: MEDICARE

## 2023-03-28 DIAGNOSIS — M54.9 BACK PAIN WITH RADIATION: Primary | ICD-10-CM

## 2023-03-28 DIAGNOSIS — M17.0 BILATERAL PRIMARY OSTEOARTHRITIS OF KNEE: ICD-10-CM

## 2023-03-28 DIAGNOSIS — M48.061 SPINAL STENOSIS OF LUMBAR REGION, UNSPECIFIED WHETHER NEUROGENIC CLAUDICATION PRESENT: ICD-10-CM

## 2023-03-28 PROCEDURE — 99213 OFFICE O/P EST LOW 20 MIN: CPT | Performed by: INTERNAL MEDICINE

## 2023-03-28 NOTE — PROGRESS NOTES
Due to COVID-19 ACTION PLAN, the patient's office visit was converted to a video visit with informed patient consent. Time Spent: 12 min    Subjective     HPI:   Jay Ely is a 75year old female who presents for follow up. Patient is doing much better with her back pain. She is taking gabapentin 100mg at bedtime which helps with pain and sleep. She had MAIKOL by Dr. Amy Martinez which brought her pain level down significantly. She also had Synvisc inj in both her knees for OA and this helped. Patient feels overall her pain is low level and manageable. No Further Nursing Notes to Review            REVIEW OF SYSTEMS:  No f/c/chest pain or sob. No cough. No other complaints today. Physical Exam:  Pleasant, NAD  Speaking in full sentences, no labored breathing  Mood is normal        Assessment    Diagnoses and all orders for this visit:    Back pain with radiation- doing well, continue gabapentin 100mg qhs    Spinal stenosis of lumbar region, unspecified whether neurogenic claudication present- doing well, she had MAIKOL thru Dr. Amy Martinez. She recently finished PT    Bilateral primary osteoarthritis of knee- pain controlled, she had synvisc injection thru Dr. Amy Martinez         Return if symptoms worsen or fail to improve. Paola Flood MD    Jay Ely understands phone evaluation is not a substitute for face-to-face examination or emergency care. Patient advised to go to ER or call 911 for worsening symptoms or acute distress. Please note that the following visit was completed using two-way, real-time interactive video communication. This has been done in good emanuel to provide continuity of care in the best interest of the provider-patient relationship, due to the on-going public health crisis/national emergency and because of restrictions of visitation. There are limitations of this visit as no physical exam could be performed.   Every conscious effort was taken to allow for sufficient and adequate time. This billing visit was spent on reviewing labs, medications, radiology tests and decision making. Appropriate medical decision-making and tests are ordered as detailed in the plan of care above.

## 2023-04-04 ENCOUNTER — APPOINTMENT (OUTPATIENT)
Dept: PHYSICAL THERAPY | Facility: HOSPITAL | Age: 73
End: 2023-04-04
Payer: MEDICARE

## 2023-04-05 ENCOUNTER — OFFICE VISIT (OUTPATIENT)
Dept: PAIN CLINIC | Facility: CLINIC | Age: 73
End: 2023-04-05
Payer: MEDICARE

## 2023-04-05 VITALS — HEART RATE: 72 BPM | DIASTOLIC BLOOD PRESSURE: 60 MMHG | OXYGEN SATURATION: 98 % | SYSTOLIC BLOOD PRESSURE: 120 MMHG

## 2023-04-05 DIAGNOSIS — M48.061 SPINAL STENOSIS OF LUMBAR REGION, UNSPECIFIED WHETHER NEUROGENIC CLAUDICATION PRESENT: Primary | ICD-10-CM

## 2023-04-05 PROCEDURE — 99214 OFFICE O/P EST MOD 30 MIN: CPT | Performed by: ANESTHESIOLOGY

## 2023-04-05 NOTE — PROGRESS NOTES
Last procedure: SYNVISC INJECTION OF BILATERAL KNEE JOINT with local  Date: 3/14/23  Percentage of relief obtained: 60-70%, pt occasionally gets some pain in the knees still, worse on right side  Duration of relief: current    Current Pain Score: 2/10      Last procedure: LUMBAR INTERLAMINAR EPIDURAL INJECTION with local  Date: 3/21/23  Percentage of relief obtained: 100%  Duration of relief: current    Current Pain Score: 0/10

## 2023-04-06 ENCOUNTER — APPOINTMENT (OUTPATIENT)
Dept: PHYSICAL THERAPY | Facility: HOSPITAL | Age: 73
End: 2023-04-06
Payer: MEDICARE

## 2023-04-14 RX ORDER — NAPROXEN 500 MG/1
TABLET ORAL
Qty: 180 TABLET | Refills: 0 | Status: SHIPPED | OUTPATIENT
Start: 2023-04-14

## 2023-04-18 DIAGNOSIS — M54.10 BACK PAIN WITH RADICULOPATHY: ICD-10-CM

## 2023-04-18 RX ORDER — LOSARTAN POTASSIUM 50 MG/1
TABLET ORAL
Qty: 180 TABLET | Refills: 1 | Status: SHIPPED | OUTPATIENT
Start: 2023-04-18

## 2023-04-18 RX ORDER — GABAPENTIN 100 MG/1
CAPSULE ORAL
Qty: 60 CAPSULE | Refills: 2 | Status: SHIPPED | OUTPATIENT
Start: 2023-04-18

## 2023-04-18 RX ORDER — TRIAMCINOLONE ACETONIDE 1 MG/G
CREAM TOPICAL
COMMUNITY
Start: 2023-04-15

## 2023-04-18 NOTE — TELEPHONE ENCOUNTER
Hypertension Medications Protocol Passed 04/18/2023 01:51 PM   Protocol Details  CMP or BMP in past 12 months    Last serum creatinine< 2.0    Appointment in past 6 or next 3 months        LOV 12/27/22 tb    LAST LAB  12/8/22    LAST RX 5/4/22 180 with 3     Next OV No future appointments.       PROTOCOL pass

## 2023-04-19 RX ORDER — CARVEDILOL 6.25 MG/1
TABLET ORAL
Qty: 270 TABLET | Refills: 1 | Status: SHIPPED | OUTPATIENT
Start: 2023-04-19

## 2023-04-19 NOTE — TELEPHONE ENCOUNTER
Hypertension Medications Protocol Passed 04/19/2023 09:31 AM   Protocol Details  CMP or BMP in past 12 months    Last serum creatinine< 2.0    Appointment in past 6 or next 3 months        LOV 12/27/22 tb     LAST LAB  12/8/22    LAST RX 5/4/22 270 with 3     Next OV No future appointments.       PROTOCOL pass

## 2023-05-27 ENCOUNTER — TELEPHONE (OUTPATIENT)
Dept: RHEUMATOLOGY | Facility: CLINIC | Age: 73
End: 2023-05-27

## 2023-05-27 DIAGNOSIS — H10.33 ACUTE CONJUNCTIVITIS OF BOTH EYES, UNSPECIFIED ACUTE CONJUNCTIVITIS TYPE: Primary | ICD-10-CM

## 2023-05-27 RX ORDER — ERYTHROMYCIN 5 MG/G
1 OINTMENT OPHTHALMIC EVERY 6 HOURS
Qty: 1 G | Refills: 0 | Status: SHIPPED | OUTPATIENT
Start: 2023-05-27

## 2023-05-27 NOTE — TELEPHONE ENCOUNTER
Pt called. Grandchild had conjuncitivitis last week and pt was exposed. Started to have some symptoms in left eye last night. Now, woke up this morning with similar symptoms in the right eye. Will send rx for eye drops to pharmacy. Encouraged pt if symptoms not improved by Tuesday (holiday weekend), to call PCP's office. Patient verbalized understanding of above instructions. No further questions at this time.     Frank Zabala DO  [facility]  5/27/2023

## 2023-05-31 DIAGNOSIS — E78.00 PURE HYPERCHOLESTEROLEMIA: ICD-10-CM

## 2023-06-01 RX ORDER — ATORVASTATIN CALCIUM 10 MG/1
TABLET, FILM COATED ORAL
Qty: 90 TABLET | Refills: 1 | Status: SHIPPED | OUTPATIENT
Start: 2023-06-01

## 2023-06-01 NOTE — TELEPHONE ENCOUNTER
Cholesterol Medication Protocol Passed 05/31/2023 01:17 PM   Protocol Details  ALT < 80    ALT resulted within past year    Lipid panel within past 12 months    Appointment within past 12 or next 3 months     Last annual 12/2022    Last lipid 11/1/22

## 2023-06-05 ENCOUNTER — LABORATORY ENCOUNTER (OUTPATIENT)
Dept: LAB | Age: 73
End: 2023-06-05
Attending: INTERNAL MEDICINE
Payer: MEDICARE

## 2023-06-05 DIAGNOSIS — I10 PRIMARY HYPERTENSION: ICD-10-CM

## 2023-06-05 LAB
ANION GAP SERPL CALC-SCNC: 4 MMOL/L (ref 0–18)
BASOPHILS # BLD AUTO: 0.09 X10(3) UL (ref 0–0.2)
BASOPHILS NFR BLD AUTO: 2 %
BUN BLD-MCNC: 20 MG/DL (ref 7–18)
CALCIUM BLD-MCNC: 9.2 MG/DL (ref 8.5–10.1)
CHLORIDE SERPL-SCNC: 106 MMOL/L (ref 98–112)
CO2 SERPL-SCNC: 24 MMOL/L (ref 21–32)
CREAT BLD-MCNC: 0.86 MG/DL
EOSINOPHIL # BLD AUTO: 0.34 X10(3) UL (ref 0–0.7)
EOSINOPHIL NFR BLD AUTO: 7.5 %
ERYTHROCYTE [DISTWIDTH] IN BLOOD BY AUTOMATED COUNT: 14.9 %
FASTING STATUS PATIENT QL REPORTED: YES
GFR SERPLBLD BASED ON 1.73 SQ M-ARVRAT: 71 ML/MIN/1.73M2 (ref 60–?)
GLUCOSE BLD-MCNC: 106 MG/DL (ref 70–99)
HCT VFR BLD AUTO: 33.6 %
HGB BLD-MCNC: 10.9 G/DL
IMM GRANULOCYTES # BLD AUTO: 0.01 X10(3) UL (ref 0–1)
IMM GRANULOCYTES NFR BLD: 0.2 %
LYMPHOCYTES # BLD AUTO: 1.56 X10(3) UL (ref 1–4)
LYMPHOCYTES NFR BLD AUTO: 34.3 %
MCH RBC QN AUTO: 29.1 PG (ref 26–34)
MCHC RBC AUTO-ENTMCNC: 32.4 G/DL (ref 31–37)
MCV RBC AUTO: 89.6 FL
MONOCYTES # BLD AUTO: 0.39 X10(3) UL (ref 0.1–1)
MONOCYTES NFR BLD AUTO: 8.6 %
NEUTROPHILS # BLD AUTO: 2.16 X10 (3) UL (ref 1.5–7.7)
NEUTROPHILS # BLD AUTO: 2.16 X10(3) UL (ref 1.5–7.7)
NEUTROPHILS NFR BLD AUTO: 47.4 %
OSMOLALITY SERPL CALC.SUM OF ELEC: 281 MOSM/KG (ref 275–295)
PLATELET # BLD AUTO: 182 10(3)UL (ref 150–450)
POTASSIUM SERPL-SCNC: 4 MMOL/L (ref 3.5–5.1)
RBC # BLD AUTO: 3.75 X10(6)UL
SODIUM SERPL-SCNC: 134 MMOL/L (ref 136–145)
WBC # BLD AUTO: 4.6 X10(3) UL (ref 4–11)

## 2023-06-05 PROCEDURE — 36415 COLL VENOUS BLD VENIPUNCTURE: CPT

## 2023-06-05 PROCEDURE — 85025 COMPLETE CBC W/AUTO DIFF WBC: CPT

## 2023-06-05 PROCEDURE — 80048 BASIC METABOLIC PNL TOTAL CA: CPT

## 2023-06-12 ENCOUNTER — TELEPHONE (OUTPATIENT)
Dept: INTERNAL MEDICINE CLINIC | Facility: CLINIC | Age: 73
End: 2023-06-12

## 2023-06-12 DIAGNOSIS — D64.9 NORMOCYTIC ANEMIA: Primary | ICD-10-CM

## 2023-06-12 DIAGNOSIS — R73.9 BLOOD GLUCOSE ELEVATED: ICD-10-CM

## 2023-06-15 ENCOUNTER — TELEPHONE (OUTPATIENT)
Dept: INTERNAL MEDICINE CLINIC | Facility: CLINIC | Age: 73
End: 2023-06-15

## 2023-07-13 RX ORDER — NAPROXEN 500 MG/1
TABLET ORAL
Qty: 180 TABLET | Refills: 0 | Status: SHIPPED | OUTPATIENT
Start: 2023-07-13

## 2023-07-17 DIAGNOSIS — M54.10 BACK PAIN WITH RADICULOPATHY: ICD-10-CM

## 2023-07-17 RX ORDER — GABAPENTIN 100 MG/1
200 CAPSULE ORAL NIGHTLY
Qty: 60 CAPSULE | Refills: 2 | Status: SHIPPED | OUTPATIENT
Start: 2023-07-17

## 2023-07-17 RX ORDER — CARVEDILOL 6.25 MG/1
TABLET ORAL
Qty: 270 TABLET | Refills: 1 | Status: SHIPPED | OUTPATIENT
Start: 2023-07-17

## 2023-07-17 NOTE — TELEPHONE ENCOUNTER
Last VISIT - 12/27/22 Wellness visit    Last CPE - 12/27/22    Last REFILL -     carvedilol 6.25 MG Oral Tab 270 tablet 1 4/19/2023     Last LABS - 6/5/23 bmp, cbc 11/1/22 cbc, lipid, tsh, cmp, cbc    Future Appointments   Date Time Provider Carter Huang   12/28/2023 11:00 AM Jocelyn Matos MD [facility]     Per PROTOCOL? FAILED    Please Approve or Deny.

## 2023-07-20 DIAGNOSIS — G89.29 OTHER CHRONIC PAIN: ICD-10-CM

## 2023-07-20 RX ORDER — DULOXETIN HYDROCHLORIDE 60 MG/1
CAPSULE, DELAYED RELEASE ORAL
Qty: 90 CAPSULE | Refills: 1 | Status: SHIPPED | OUTPATIENT
Start: 2023-07-20

## 2023-08-11 ENCOUNTER — OFFICE VISIT (OUTPATIENT)
Dept: PAIN CLINIC | Facility: CLINIC | Age: 73
End: 2023-08-11
Payer: MEDICARE

## 2023-08-11 VITALS — OXYGEN SATURATION: 97 % | HEART RATE: 67 BPM | SYSTOLIC BLOOD PRESSURE: 112 MMHG | DIASTOLIC BLOOD PRESSURE: 64 MMHG

## 2023-08-11 DIAGNOSIS — G89.29 CHRONIC PAIN OF BOTH KNEES: ICD-10-CM

## 2023-08-11 DIAGNOSIS — M48.062 SPINAL STENOSIS OF LUMBAR REGION WITH NEUROGENIC CLAUDICATION: Primary | ICD-10-CM

## 2023-08-11 DIAGNOSIS — M25.562 CHRONIC PAIN OF BOTH KNEES: ICD-10-CM

## 2023-08-11 DIAGNOSIS — M25.561 CHRONIC PAIN OF BOTH KNEES: ICD-10-CM

## 2023-08-11 PROCEDURE — 99214 OFFICE O/P EST MOD 30 MIN: CPT | Performed by: PHYSICIAN ASSISTANT

## 2023-08-11 NOTE — PROGRESS NOTES
Patient presents in office today with reported pain in lower back, both knees    Wants to discuss bilateral knee injections and for back. She is aware she cannot repeat them till 6 months, but they are going to [facility] Sept 3, so they want to discuss options.     Current pain level reported = 3-4/10    Last reported dose of NA today      Narcotic Contract renewal NA    Urine Drug screen NA

## 2023-08-22 ENCOUNTER — APPOINTMENT (OUTPATIENT)
Dept: GENERAL RADIOLOGY | Facility: HOSPITAL | Age: 73
End: 2023-08-22
Attending: ANESTHESIOLOGY
Payer: MEDICARE

## 2023-08-22 ENCOUNTER — HOSPITAL ENCOUNTER (OUTPATIENT)
Facility: HOSPITAL | Age: 73
Setting detail: HOSPITAL OUTPATIENT SURGERY
Discharge: HOME | End: 2023-08-22
Attending: ANESTHESIOLOGY | Admitting: ANESTHESIOLOGY
Payer: MEDICARE

## 2023-08-22 VITALS
RESPIRATION RATE: 18 BRPM | TEMPERATURE: 98 F | SYSTOLIC BLOOD PRESSURE: 138 MMHG | HEART RATE: 65 BPM | DIASTOLIC BLOOD PRESSURE: 53 MMHG | OXYGEN SATURATION: 97 %

## 2023-08-22 PROCEDURE — 3E0U33Z INTRODUCTION OF ANTI-INFLAMMATORY INTO JOINTS, PERCUTANEOUS APPROACH: ICD-10-PCS | Performed by: ANESTHESIOLOGY

## 2023-08-22 PROCEDURE — 62323 NJX INTERLAMINAR LMBR/SAC: CPT | Performed by: ANESTHESIOLOGY

## 2023-08-22 RX ORDER — SODIUM CHLORIDE 9 MG/ML
INJECTION INTRAVENOUS
Status: DISCONTINUED | OUTPATIENT
Start: 2023-08-22 | End: 2023-08-22

## 2023-08-22 RX ORDER — LIDOCAINE HYDROCHLORIDE 10 MG/ML
INJECTION, SOLUTION EPIDURAL; INFILTRATION; INTRACAUDAL; PERINEURAL
Status: DISCONTINUED | OUTPATIENT
Start: 2023-08-22 | End: 2023-08-22

## 2023-08-22 RX ORDER — DEXAMETHASONE SODIUM PHOSPHATE 10 MG/ML
INJECTION, SOLUTION INTRAMUSCULAR; INTRAVENOUS
Status: DISCONTINUED | OUTPATIENT
Start: 2023-08-22 | End: 2023-08-22

## 2023-08-22 NOTE — OPERATIVE REPORT
[facility]  Operative Report  2023     Anitra Jolly Patient Status:  Hospital Outpatient Surgery    3/27/1950 MRN [id number]   Location [address] [address] Attending Giovanna Kauffman MD   Hosp Day # 0 PCP Rebecca Reyes MD     Indication: Miya Juares is a 75year old female with lumbar radiculopathy    Imaging: MRI reviewed    Preoperative Diagnosis:  Lumbar radiculopathy [M54.16]    Postoperative Diagnosis: Same as above. Procedure performed: LUMBAR INTERLAMINAR EPIDURAL INJECTION with local    Anesthesia: Local      EBL: Less than 1 ml. Procedure Description:  After reviewing the patient's history and performing a focused physical examination, the diagnosis and positive previous diagnostic tests were confirmed and contraindications such as infection and coagulopathy were ruled out. Following review of allergies and potential side effects and complications, including but not necessarily limited to infection, allergic reaction, local tissue breakdown, nerve injury, post-dural puncture headache and paresis, the patient consented for the procedure. The patient was brought to the procedure room in prone position. After sterile prep of the lumbar spine, the L5-S1 interspace was identified with the help of fluoroscopy. Local anesthetic was given by a 25 gauge 1.5 inch needle with 1% lidocaine in that space level. Thereafter, a 20 gauge Tuohy needle was introduced and advanced under fluoroscopy. The epidural space was accessed by using loss of resistance to air technique. The needle position was confirmed with AP and lateral view under fluoroscopy. Omnipaque 180 was injected in 1 mL volume. A good epidurogram was obtained. Thereafter, dexamethasone 10 mg with normal saline of 5 mL in total volume of 6 mL was injected through the Tuohy needle. The needle was flushed with 1 mL lidocaine. The needle was withdrawn with the stylet intact in situ. The needle's tip was intact. The patient tolerated the procedure very well without significant immediate complication. The patient's back was cleaned and sterile dressing was applied. The patient was discharged with an instruction to a responsible adult after discharge criteria were met. The patient was advised to contact us should any problems happen. The patient was also informed to go to the emergency room immediately if experiencing severe numbness or weakness in the extremities or experiencing bowel or bladder incontinence. Complications: None. Follow up: The patient was followed in the pain clinic as needed basis.           Anabella Snyder MD

## 2023-08-22 NOTE — H&P
History & Physical Examination    Patient Name: Danny Darden  MRN: [id number]  [facility]: [id number]  YOB: 1950    Pre-Operative Diagnosis:  Lumbar radiculopathy [M54.16]    Present Illness: Lumbar radiculopathy    lidocaine HCl (XYLOCAINE) 1 % injection 5 mL, 5 mL, Intradermal, Once, Hermelindo, Gaby, DO  triamcinolone acetonide (KENALOG-40) 40 MG/ML injection 40 mg, 40 mg, Intra-articular, Once, Hermelindo, Gaby, DO    DULOXETINE 60 MG Oral Cap DR Particles, TAKE 1 CAPSULE(60 MG) BY MOUTH DAILY, Disp: 90 capsule, Rfl: 1  carvedilol 6.25 MG Oral Tab, TAKE 1 TABLET BY MOUTH EVERY MORNING AND 2 TABLETS EVERY EVENING., Disp: 270 tablet, Rfl: 1  gabapentin 100 MG Oral Cap, Take 2 capsules (200 mg total) by mouth nightly., Disp: 60 capsule, Rfl: 2  NAPROXEN 500 MG Oral Tab, TAKE 1 TABLET(500 MG) BY MOUTH TWICE DAILY WITH FOOD AS NEEDED FOR PAIN OR SWELLING, Disp: 180 tablet, Rfl: 0, 8/20/2023 at 2000  ATORVASTATIN 10 MG Oral Tab, TAKE 1 TABLET(10 MG) BY MOUTH EVERY NIGHT, Disp: 90 tablet, Rfl: 1  erythromycin 5 MG/GM Ophthalmic Ointment, Place 1 Application into both eyes every 6 (six) hours. , Disp: 1 g, Rfl: 0  triamcinolone 0.1 % External Cream, , Disp: , Rfl:   LOSARTAN 50 MG Oral Tab, TAKE 1 TABLET(50 MG) BY MOUTH TWICE DAILY, Disp: 180 tablet, Rfl: 1  RHOPRESSA 0.02 % Ophthalmic Solution, 1 drop by Each eye route nightly., Disp: , Rfl:   furosemide 40 MG Oral Tab, Take 1 tablet (40 mg total) by mouth daily. , Disp: 90 tablet, Rfl: 3  estradiol 0.1 MG/GM Vaginal Cream, Place 1 g vaginally twice a week., Disp: 42.5 g, Rfl: 1  clotrimazole 1 % External Cream, Apply 1 Application topically 2 (two) times daily. , Disp: 40 g, Rfl: 0  brimonidine 0.2 % Ophthalmic Solution, Place 300 drops into both eyes 2 (two) times daily. , Disp: , Rfl:   LATANOPROST 0.005 % Ophthalmic Solution, INSTILL 1 DROP IN BOTH EYES EVERY NIGHT, Disp: 7.5 mL, Rfl: 0  Dorzolamide HCl-Timolol Mal 22.3-6.8 MG/ML Ophthalmic Solution, , Disp: , Rfl: 0      No current facility-administered medications for this encounter. Allergies:   Diphenhydramine         OTHER (SEE COMMENTS)    Comment:Unknown reaction  Heparin                 OTHER (SEE COMMENTS)    Comment:Seizure             Blood clot  Benadryl [Diphenhyd*        Comment:Rapid heart rate  Heparin, Porcine        OTHER (SEE COMMENTS)  Penicillins             HIVES  Penicillin G            RASH    Past Medical History:   Diagnosis Date    Arthritis     Atherosclerosis of coronary artery     Back pain     Cataract     Eczema     Essential hypertension     Hyperlipidemia     Meningioma (HCC)     Osteoarthritis     Pure hypercholesterolemia     Seizure disorder [facility])      Past Surgical History:   Procedure Laterality Date    ANGIOGRAM      BIOPSY OF THYROID,PERCUT  12/15/2011    fine needle aspiration-right thyroid nodule-hyperplastic nodule    CATARACT      HYSTERECTOMY      age 44     Family History   Problem Relation Age of Onset    Breast Cancer Mother 46    Uterine Cancer Mother     Diabetes Father     Heart Attack Father      Social History    Tobacco Use      Smoking status: Never      Smokeless tobacco: Never    Alcohol use: No      Alcohol/week: 0.0 standard drinks of alcohol      Comment: occ      SYSTEM Check if Review is Normal Check if Physical Exam is Normal If not normal, please explain:   HEENT [x ] [x ]    NECK & BACK [x ] [x ]    HEART [x ] [x ]    LUNGS [x ] [x ]    ABDOMEN [x ] [x ]    UROGENITAL [x ] [x ]    EXTREMITIES [x ] [x ]    OTHER        [ x ] I have discussed the risks and benefits and alternatives with the patient/family. They understand and agree to proceed with plan of care. [ x ] I have reviewed the History and Physical done within the last 30 days. Any changes noted above.     Avel Wilson MD

## 2023-08-22 NOTE — DISCHARGE INSTRUCTIONS
Home Care Instructions Following Your Pain Procedure     Glenn,  It has been a pleasure to have you as our patient. To help you at home, you must follow these general discharge instructions. We will review these with you before you are discharged. It is our hope that you have a complete and uneventful recovery from our procedure. General Instructions:  What to Expect:  Bandages from your procedure today can be removed when you get home. Please avoid soaking and/or swimming for 24 hours. Showering is okay  It is normal to have increased pain symptoms and/or pain at injection site for up to 3-5 days after procedure, you can use heat or ice (20 minutes on 20 minutes off) for comfort. You may experience some temporary side effects which may include restlessness or insomnia, flushing of the face, or heart palpitations. Please contact the provider if these symptoms do not resolve within 3-4 days. Lightheadedness or nausea may occur and should resolve within 24 to 48 hours. If you develop a headache after treatment, rest, drink fluids (with caffeine, if possible) and take mild over-the-counter pain medication. If the headache does not improve with the above treatment, contact the physician. Home Medications:  Resume all previously prescribed medication. Please avoid taking NSAIDs (Non-Steriodal Anti-Inflammatory Drugs) such as:  Ibuprofen ( Advil, Motrin) Aleve (Naproxen), Diclofenac, Meloxicam for 6 hours after procedure. If you are on Coumadin (Warfarin) or any other anti-coagulant (or \"blood thinning\") medication such as Plavix (Clopidogrel), Xarelto (Rivaroxaban), Eliquis (Apixaban), Effient (Prasugrel) etc., restart on the following day from the procedure unless otherwise directed by your provider. If you are a diabetic, please increase the frequency of your glucose monitoring after the procedure as steroids may cause a temporary (2-3 day) increase in your blood sugar.   Contact your primary care physician if your blood sugar remains elevated as you may require some medication adjustment. Diet:  Resume your regular diet as tolerated. Activity: We recommend that you relax and rest during the rest of your procedure day. If you feel weakness in your arms or legs do not drive. Follow-up Appointment  Please schedule a follow-up visit within 3 to 4 weeks after your last procedure date. Question or Concerns:  Feel free to call our office with any questions or concerns at [phone] (option #2)    Glenn  Thank you for coming to [facility] for your procedure. The nurses try very hard to make sure you receive the best care possible. Your trust in them as well as us is greatly appreciated.     Thanks so much,   Dr. Sharonda Santiago

## 2023-08-23 ENCOUNTER — TELEPHONE (OUTPATIENT)
Dept: PAIN CLINIC | Facility: CLINIC | Age: 73
End: 2023-08-23

## 2023-08-23 NOTE — TELEPHONE ENCOUNTER
Courtesy called placed to patient for post procedure follow up. Patient stated only slight pain right now, but does have a headache. Informed patient that soreness is to be expected after the procedure. Educated patient that it takes 3-5 days for the steroid to be effective and to allow adequate time for medication to work. Encouraged patient to alternate ice and heat and to take medications as prescribed. Pt verbalized understanding to call with any questions or concerns.       Procedure: LUMBAR INTERLAMINAR EPIDURAL INJECTION with local   Date: 08/22/23  Follow up Visit Scheduled:  will schedule after knee injection

## 2023-08-28 ENCOUNTER — LAB ENCOUNTER (OUTPATIENT)
Dept: LAB | Age: 73
End: 2023-08-28
Attending: PHYSICIAN ASSISTANT
Payer: MEDICARE

## 2023-08-28 ENCOUNTER — OFFICE VISIT (OUTPATIENT)
Dept: INTERNAL MEDICINE CLINIC | Facility: CLINIC | Age: 73
End: 2023-08-28
Payer: MEDICARE

## 2023-08-28 VITALS
BODY MASS INDEX: 33.65 KG/M2 | DIASTOLIC BLOOD PRESSURE: 66 MMHG | HEIGHT: 60.24 IN | RESPIRATION RATE: 16 BRPM | HEART RATE: 64 BPM | WEIGHT: 173.63 LBS | TEMPERATURE: 99 F | SYSTOLIC BLOOD PRESSURE: 144 MMHG

## 2023-08-28 DIAGNOSIS — H04.129 DRY EYE: Primary | ICD-10-CM

## 2023-08-28 DIAGNOSIS — R73.9 BLOOD GLUCOSE ELEVATED: ICD-10-CM

## 2023-08-28 DIAGNOSIS — D64.9 NORMOCYTIC ANEMIA: ICD-10-CM

## 2023-08-28 DIAGNOSIS — H04.129 DRY EYE: ICD-10-CM

## 2023-08-28 DIAGNOSIS — R68.2 DRY MOUTH: ICD-10-CM

## 2023-08-28 LAB
BASOPHILS # BLD AUTO: 0.05 X10(3) UL (ref 0–0.2)
BASOPHILS NFR BLD AUTO: 0.9 %
CRP SERPL-MCNC: <0.29 MG/DL (ref ?–0.3)
DEPRECATED HBV CORE AB SER IA-ACNC: 44.3 NG/ML
EOSINOPHIL # BLD AUTO: 0.36 X10(3) UL (ref 0–0.7)
EOSINOPHIL NFR BLD AUTO: 6.3 %
ERYTHROCYTE [DISTWIDTH] IN BLOOD BY AUTOMATED COUNT: 15 %
ERYTHROCYTE [SEDIMENTATION RATE] IN BLOOD: 9 MM/HR
EST. AVERAGE GLUCOSE BLD GHB EST-MCNC: 123 MG/DL (ref 68–126)
HBA1C MFR BLD: 5.9 % (ref ?–5.7)
HCT VFR BLD AUTO: 36.4 %
HGB BLD-MCNC: 11.7 G/DL
IMM GRANULOCYTES # BLD AUTO: 0.01 X10(3) UL (ref 0–1)
IMM GRANULOCYTES NFR BLD: 0.2 %
IRON SATN MFR SERPL: 25 %
IRON SERPL-MCNC: 102 UG/DL
LYMPHOCYTES # BLD AUTO: 1.84 X10(3) UL (ref 1–4)
LYMPHOCYTES NFR BLD AUTO: 32.1 %
MCH RBC QN AUTO: 30.2 PG (ref 26–34)
MCHC RBC AUTO-ENTMCNC: 32.1 G/DL (ref 31–37)
MCV RBC AUTO: 93.8 FL
MONOCYTES # BLD AUTO: 0.56 X10(3) UL (ref 0.1–1)
MONOCYTES NFR BLD AUTO: 9.8 %
NEUTROPHILS # BLD AUTO: 2.91 X10 (3) UL (ref 1.5–7.7)
NEUTROPHILS # BLD AUTO: 2.91 X10(3) UL (ref 1.5–7.7)
NEUTROPHILS NFR BLD AUTO: 50.7 %
PLATELET # BLD AUTO: 164 10(3)UL (ref 150–450)
RBC # BLD AUTO: 3.88 X10(6)UL
RHEUMATOID FACT SERPL-ACNC: <10 IU/ML (ref ?–15)
TIBC SERPL-MCNC: 413 UG/DL (ref 240–450)
TRANSFERRIN SERPL-MCNC: 277 MG/DL (ref 200–360)
VIT B12 SERPL-MCNC: >2000 PG/ML (ref 193–986)
WBC # BLD AUTO: 5.7 X10(3) UL (ref 4–11)

## 2023-08-28 PROCEDURE — 86140 C-REACTIVE PROTEIN: CPT

## 2023-08-28 PROCEDURE — 86431 RHEUMATOID FACTOR QUANT: CPT

## 2023-08-28 PROCEDURE — 83550 IRON BINDING TEST: CPT

## 2023-08-28 PROCEDURE — 85652 RBC SED RATE AUTOMATED: CPT

## 2023-08-28 PROCEDURE — 86038 ANTINUCLEAR ANTIBODIES: CPT

## 2023-08-28 PROCEDURE — 83540 ASSAY OF IRON: CPT

## 2023-08-28 PROCEDURE — 99213 OFFICE O/P EST LOW 20 MIN: CPT | Performed by: PHYSICIAN ASSISTANT

## 2023-08-28 PROCEDURE — 86225 DNA ANTIBODY NATIVE: CPT

## 2023-08-28 PROCEDURE — 82607 VITAMIN B-12: CPT

## 2023-08-28 PROCEDURE — 86200 CCP ANTIBODY: CPT

## 2023-08-28 PROCEDURE — 85025 COMPLETE CBC W/AUTO DIFF WBC: CPT

## 2023-08-28 PROCEDURE — 82728 ASSAY OF FERRITIN: CPT

## 2023-08-28 PROCEDURE — 36415 COLL VENOUS BLD VENIPUNCTURE: CPT

## 2023-08-28 PROCEDURE — 1126F AMNT PAIN NOTED NONE PRSNT: CPT | Performed by: PHYSICIAN ASSISTANT

## 2023-08-28 PROCEDURE — 83036 HEMOGLOBIN GLYCOSYLATED A1C: CPT

## 2023-08-28 RX ORDER — FUROSEMIDE 20 MG/1
TABLET ORAL
COMMUNITY
Start: 2023-05-21

## 2023-08-28 RX ORDER — TIMOLOL MALEATE 5 MG/ML
SOLUTION OPHTHALMIC
COMMUNITY

## 2023-08-28 RX ORDER — PREDNISOLONE ACETATE 10 MG/ML
SUSPENSION/ DROPS OPHTHALMIC
COMMUNITY
Start: 2023-06-14

## 2023-08-29 ENCOUNTER — APPOINTMENT (OUTPATIENT)
Dept: GENERAL RADIOLOGY | Facility: HOSPITAL | Age: 73
End: 2023-08-29
Attending: ANESTHESIOLOGY
Payer: MEDICARE

## 2023-08-29 ENCOUNTER — HOSPITAL ENCOUNTER (OUTPATIENT)
Facility: HOSPITAL | Age: 73
Setting detail: HOSPITAL OUTPATIENT SURGERY
Discharge: HOME | End: 2023-08-29
Attending: ANESTHESIOLOGY | Admitting: ANESTHESIOLOGY
Payer: MEDICARE

## 2023-08-29 VITALS
HEART RATE: 66 BPM | OXYGEN SATURATION: 99 % | DIASTOLIC BLOOD PRESSURE: 71 MMHG | TEMPERATURE: 97 F | SYSTOLIC BLOOD PRESSURE: 137 MMHG | RESPIRATION RATE: 16 BRPM

## 2023-08-29 LAB
CCP IGG SERPL-ACNC: 0.6 U/ML (ref 0–6.9)
DSDNA IGG SERPL IA-ACNC: 1.2 IU/ML
ENA AB SER QL IA: 0.1 UG/L
ENA AB SER QL IA: NEGATIVE

## 2023-08-29 PROCEDURE — 77002 NEEDLE LOCALIZATION BY XRAY: CPT | Performed by: ANESTHESIOLOGY

## 2023-08-29 PROCEDURE — 3E0U3BZ INTRODUCTION OF ANESTHETIC AGENT INTO JOINTS, PERCUTANEOUS APPROACH: ICD-10-PCS | Performed by: ANESTHESIOLOGY

## 2023-08-29 PROCEDURE — 20610 DRAIN/INJ JOINT/BURSA W/O US: CPT | Performed by: ANESTHESIOLOGY

## 2023-08-29 PROCEDURE — 3E0U33Z INTRODUCTION OF ANTI-INFLAMMATORY INTO JOINTS, PERCUTANEOUS APPROACH: ICD-10-PCS | Performed by: ANESTHESIOLOGY

## 2023-08-29 RX ORDER — ASPIRIN 81 MG/1
81 TABLET ORAL DAILY
COMMUNITY

## 2023-08-29 RX ORDER — LIDOCAINE HYDROCHLORIDE 10 MG/ML
INJECTION, SOLUTION EPIDURAL; INFILTRATION; INTRACAUDAL; PERINEURAL
Status: DISCONTINUED | OUTPATIENT
Start: 2023-08-29 | End: 2023-08-29

## 2023-08-29 RX ORDER — METHYLPREDNISOLONE ACETATE 40 MG/ML
INJECTION, SUSPENSION INTRA-ARTICULAR; INTRALESIONAL; INTRAMUSCULAR; SOFT TISSUE
Status: DISCONTINUED | OUTPATIENT
Start: 2023-08-29 | End: 2023-08-29

## 2023-08-30 ENCOUNTER — TELEPHONE (OUTPATIENT)
Dept: PAIN CLINIC | Facility: CLINIC | Age: 73
End: 2023-08-30

## 2023-10-13 ENCOUNTER — TELEPHONE (OUTPATIENT)
Dept: UROLOGY | Facility: CLINIC | Age: 73
End: 2023-10-13

## 2023-10-13 NOTE — TELEPHONE ENCOUNTER
Spoke with patient to remind of 10/17 appointment. Provided New Patient instructions, patient expressed understanding and confirmed appointment.

## 2023-10-16 RX ORDER — NAPROXEN 500 MG/1
500 TABLET ORAL 2 TIMES DAILY PRN
Qty: 180 TABLET | Refills: 0 | Status: SHIPPED | OUTPATIENT
Start: 2023-10-16

## 2023-10-16 RX ORDER — LOSARTAN POTASSIUM 50 MG/1
50 TABLET ORAL 2 TIMES DAILY
Qty: 180 TABLET | Refills: 0 | Status: SHIPPED | OUTPATIENT
Start: 2023-10-16

## 2023-10-16 NOTE — TELEPHONE ENCOUNTER
Appt scheduled 12/28/23    Pt sees Dr. Nimesh Schneider Start End    NAPROXEN 500 MG Oral Tab 180 tablet 0 7/13/2023     Sig: TAKE 1 TABLET(500 MG) BY MOUTH TWICE DAILY WITH FOOD AS NEEDED FOR PAIN OR SWELLING    Sent to pharmacy as: Naproxen 500 MG Oral Tablet (Naprosyn)    E-Prescribing Status: Receipt confirmed by pharmacy (7/13/2023  4:55 PM CDT)      [id number] - [address] [facility],  Yoli Garcia [address] [place] [address] & [facility], [phone], [phone]

## 2023-10-17 ENCOUNTER — OFFICE VISIT (OUTPATIENT)
Dept: UROLOGY | Facility: CLINIC | Age: 73
End: 2023-10-17
Attending: OBSTETRICS & GYNECOLOGY
Payer: MEDICARE

## 2023-10-17 VITALS
DIASTOLIC BLOOD PRESSURE: 78 MMHG | BODY MASS INDEX: 33.52 KG/M2 | RESPIRATION RATE: 18 BRPM | SYSTOLIC BLOOD PRESSURE: 130 MMHG | HEIGHT: 60.24 IN | WEIGHT: 173 LBS | TEMPERATURE: 98 F

## 2023-10-17 DIAGNOSIS — R35.1 NOCTURIA: ICD-10-CM

## 2023-10-17 DIAGNOSIS — K59.00 CONSTIPATION: ICD-10-CM

## 2023-10-17 DIAGNOSIS — N81.6 RECTOCELE: ICD-10-CM

## 2023-10-17 DIAGNOSIS — N39.3 FEMALE STRESS INCONTINENCE: Primary | ICD-10-CM

## 2023-10-17 DIAGNOSIS — N39.41 URGE INCONTINENCE: ICD-10-CM

## 2023-10-17 LAB
BLOOD URINE: NEGATIVE
CONTROL RUN WITHIN 24 HOURS?: YES
LEUKOCYTE ESTERASE URINE: NEGATIVE
NITRITE URINE: NEGATIVE

## 2023-10-17 PROCEDURE — 99212 OFFICE O/P EST SF 10 MIN: CPT

## 2023-10-17 PROCEDURE — 87086 URINE CULTURE/COLONY COUNT: CPT | Performed by: OBSTETRICS & GYNECOLOGY

## 2023-10-17 PROCEDURE — 81002 URINALYSIS NONAUTO W/O SCOPE: CPT | Performed by: OBSTETRICS & GYNECOLOGY

## 2023-10-17 PROCEDURE — 51701 INSERT BLADDER CATHETER: CPT | Performed by: OBSTETRICS & GYNECOLOGY

## 2023-10-22 DIAGNOSIS — G89.29 OTHER CHRONIC PAIN: ICD-10-CM

## 2023-10-24 DIAGNOSIS — I10 PRIMARY HYPERTENSION: ICD-10-CM

## 2023-10-24 DIAGNOSIS — R60.0 BILATERAL LEG EDEMA: ICD-10-CM

## 2023-10-24 RX ORDER — DULOXETIN HYDROCHLORIDE 60 MG/1
CAPSULE, DELAYED RELEASE ORAL
Qty: 90 CAPSULE | Refills: 1 | OUTPATIENT
Start: 2023-10-24

## 2023-10-25 RX ORDER — FUROSEMIDE 40 MG/1
40 TABLET ORAL DAILY
Qty: 90 TABLET | Refills: 3 | Status: SHIPPED | OUTPATIENT
Start: 2023-10-25

## 2023-11-02 ENCOUNTER — TELEPHONE (OUTPATIENT)
Dept: UROLOGY | Facility: CLINIC | Age: 73
End: 2023-11-02

## 2023-11-02 NOTE — TELEPHONE ENCOUNTER
Dada Arcosled pt, LMOR and confirmed UDS appointment, reviewed instructions.  Pt to call with questions

## 2023-11-03 ENCOUNTER — TELEPHONE (OUTPATIENT)
Dept: UROLOGY | Facility: CLINIC | Age: 73
End: 2023-11-03

## 2023-11-06 ENCOUNTER — OFFICE VISIT (OUTPATIENT)
Dept: UROLOGY | Facility: CLINIC | Age: 73
End: 2023-11-06
Attending: OBSTETRICS & GYNECOLOGY
Payer: MEDICARE

## 2023-11-06 VITALS
BODY MASS INDEX: 34 KG/M2 | DIASTOLIC BLOOD PRESSURE: 78 MMHG | SYSTOLIC BLOOD PRESSURE: 134 MMHG | WEIGHT: 173 LBS | RESPIRATION RATE: 16 BRPM

## 2023-11-06 DIAGNOSIS — N81.6 RECTOCELE: ICD-10-CM

## 2023-11-06 DIAGNOSIS — N39.41 URGE INCONTINENCE: ICD-10-CM

## 2023-11-06 DIAGNOSIS — N39.3 FEMALE STRESS INCONTINENCE: Primary | ICD-10-CM

## 2023-11-06 PROCEDURE — 51797 INTRAABDOMINAL PRESSURE TEST: CPT

## 2023-11-06 PROCEDURE — 81002 URINALYSIS NONAUTO W/O SCOPE: CPT

## 2023-11-06 PROCEDURE — 51729 CYSTOMETROGRAM W/VP&UP: CPT

## 2023-11-06 PROCEDURE — 51784 ANAL/URINARY MUSCLE STUDY: CPT

## 2023-11-06 PROCEDURE — 51741 ELECTRO-UROFLOWMETRY FIRST: CPT

## 2023-11-06 NOTE — PATIENT INSTRUCTIONS
[facility] UROGYNECOLOGY  Christinecristopher Ladd [address]  [place] [address]  Juan 30: [phone]  FAX: [phone]       Urodynamic Testing Discharge Instructions: There are NO dietary or activity restrictions. You may resume your normal schedule. You may have mild discomfort for a few hours after your testing today. There may be some mild burning when you urinate or you may see some blood in your urine. These problems should not last more than 24 hours. The following suggestions may minimize any symptoms you experience. Drink 6-8 large glasses of water over the next 8 hours  A compress or sitz bath may be soothing  Tylenol or Ibuprofen may be taken as needed    If you experience any of the following, please call the office or, if after hours, the on-call physician at [phone]. Excessive pain  Bright red bloody discharge  Fever or chills  Continued urgency, frequency or burning with urination    Obtaining Test Results    Your urodynamic test will be interpreted by a specialist and available to the referring physician within 7-14 days. Patients in our clinic are given an appointment to come back to discuss the results and any appropriate treatment recommendations. Please do not hesitate to contact our office with any questions or concerns at [phone]. I acknowledge that I have received verbal and written discharge  instructions and that I understand these instructions clearly.     Patient Signature:    Date:

## 2023-11-06 NOTE — PROCEDURES
Patient here for urodynamic testing. Procedure explained and confirmed by patient. See evaluation form for results. Both verbal and written discharge instructions were given. Patient tolerated procedure well and will follow up with Dr. Sulma Jim on 23. URODYNAMIC EVALUATION    PATIENT HISTORY:    Prolapse:  Yes - not bothersome  MANJULA:  Yes is a bother at times - wears a pad \" just in case\"  UUI:  Yes - usually makes it to BR without leaking   Nocturia:  2  Frequency:  every 2-3 hours  Sense of Incomplete Emptying:  No  Constipation:  No -  improved - bowels are soft if she uses miralax/ fiber - also is on iron supplement which she reports makes bowels soft  Last void prior to UDS testin minutes  Current urge to void? Moderate  OAB meds stopped prior to test?  NA  Other symptoms? Seeing Rosa Arrington for back pain PT and also for pelvic floor therapy - reports that this has helped with urgency    Surgery? [x]  No  []  Interested in surgery:   []  Yes, specify date:    Surgical folder provided? []  Yes  [x]  No     PATIENT DIAGNOSIS:  Urge Incontinence N39.41 and Stress Incontinence N39.3    UDS PROCEDURAL FINDINGS:  Stress Incontinence N39.3 and Detrusor Instability N31.9    MEDICATION: furosemide 40 MG Oral Tab, Take 1 tablet (40 mg total) by mouth daily. , Disp: 90 tablet, Rfl: 3  losartan 50 MG Oral Tab, Take 1 tablet (50 mg total) by mouth 2 (two) times daily. , Disp: 180 tablet, Rfl: 0  naproxen 500 MG Oral Tab, Take 1 tablet (500 mg total) by mouth 2 (two) times daily as needed. WITH FOOD, Disp: 180 tablet, Rfl: 0  aspirin 81 MG Oral Tab EC, Take 1 tablet (81 mg total) by mouth daily. , Disp: , Rfl:   DULOXETINE 60 MG Oral Cap DR Particles, TAKE 1 CAPSULE(60 MG) BY MOUTH DAILY, Disp: 90 capsule, Rfl: 1  carvedilol 6.25 MG Oral Tab, TAKE 1 TABLET BY MOUTH EVERY MORNING AND 2 TABLETS EVERY EVENING., Disp: 270 tablet, Rfl: 1  gabapentin 100 MG Oral Cap, Take 2 capsules (200 mg total) by mouth nightly., Disp: 60 capsule, Rfl: 2  ATORVASTATIN 10 MG Oral Tab, TAKE 1 TABLET(10 MG) BY MOUTH EVERY NIGHT, Disp: 90 tablet, Rfl: 1  estradiol 0.1 MG/GM Vaginal Cream, Place 1 g vaginally twice a week., Disp: 42.5 g, Rfl: 1  brimonidine 0.2 % Ophthalmic Solution, Place 300 drops into both eyes 2 (two) times daily. , Disp: , Rfl:   Dorzolamide HCl-Timolol Mal 22.3-6.8 MG/ML Ophthalmic Solution, , Disp: , Rfl: 0    lidocaine HCl (XYLOCAINE) 1 % injection 5 mL, 5 mL, Intradermal, Once, Hermelindo, Gaby, DO  triamcinolone acetonide (KENALOG-40) 40 MG/ML injection 40 mg, 40 mg, Intra-articular, Once, Hermelindo, Gaby, DO         ALLERGIES:  Diphenhydramine; Heparin; Penicillins; Benadryl [Diphenhydramine Hcl]; Heparin, Porcine; and Penicillin G      EXAM:  Urinalysis Dip:  Today's Results   Component Date Value    control run 11/06/2023 Yes     Blood Urine 11/06/2023 Negative     Nitrite Urine 11/06/2023 Negative     Leukocyte esterase urine 11/06/2023 Negative       Urovesico Junction ( >30 degrees ):  [x]  Mobile  []  Fixed    Perineal Sensation:  [x]  Normal  []  Abnormal    Additional Notes:    PROLAPSE:  []  Yes  [x]  No  Prolapse reduced for testing?   [x]  Yes  []  No  []  Pessary  [x]  Manual  []  Half Speculum    Additional Notes:    UROFLOWMETRY:  UNReduced  Voided Volume:                     181        mL  Maximum Flow Rate:                         34       mL/sec  Average flow rate:                       10      mL/sec  Post-void Residual:                      45     mL  Pattern:  [x]  Normal  []  Poor flow     []  Intermittent  []  Other  Void:   [x]  Typical  []  Atypical    Additional Notes:    CYSTOMETRY:  Urethral Catheter:  Fr 7 / tdoc  Abd Catheter:     Fr 7 / tdoc   Infusion:  Water Rate 30 mL/min  Temp:  Room  Position:  [x]  Sit  []  Stand  []  Supine  First sensation:   116 mL  First desire to void:   178 mL  Strong desire to void:  317 mL  Maximum cystometric capacity:   385 mL  Detrusor Activity: [x]  Unstable   []  Stable  Urge leakage? []  Yes [x]  No  Volume at 1st unhibited detrusor cont:   178 mL  Detrusor instability provoked by:    []  Spontaneous []  Coughing  [x]  Filling  []  Valsalva  []  Other    Additional Notes:  rise in pdet noted at 178 ml - this did subside with fluid stopped    URETHRAL FUNCTION:  Valsava (vesical) Leak Point Pressures:    Volume Leak Point Pressure Leak? Cough Valsalva      100mL 205 121    cm H2O [x]  Yes []  No         REDUCED: manual   200mL 55 65    cm H2O [x]  Yes []  No       Genuine Stress Incontinence demonstrated? [x]  Yes @ 100 ml with cough unreduced  []  No    Resting Urethral Pressure Profile:     Functional Urethral Length:         0.5 cm          0.4  cm    Maximum UCP:         55  cm           48  cm       PRESSURE/FLOW STUDY:  Unreduced  Voided volume:     580   mL  Maximum flow rate:        42 mL/sec  Pressure Detrusor (at maximum flow):           46 cm H2O  Post void residual:          62     mL  Voiding mechanism:  [x]  Abnormal  []  Normal  [x]  Strain to void   []  Weak detrusor  Void:   [x]  Typical   []  Atypical    Additional Notes:  Uroflowmetry, cystometry, and pressure / flow study were completed using calibrated electronic equipment and air charged transducers.     EMG:  During fill: Reactive    During flow study: Reactive    11/6/2023 1:44 PM     PERFORMED BY:  Willa Witt RN      URODYNAMIC PHYSICIAN INTERPRETATION    IMPRESSION:   181/45cc & 580/62cc  prison 385cc  DO @ 178cc  MANJULA  @ 100 ml with cough unreduced   Post-Procedure Diagnoses: Detrusor instability [N31.9] and Stress urinary incontinence [N39.3]    Comments:      Kimberly St DO   11/6/2023   2:09 PM

## 2023-11-14 DIAGNOSIS — M54.10 BACK PAIN WITH RADICULOPATHY: ICD-10-CM

## 2023-11-15 RX ORDER — GABAPENTIN 100 MG/1
200 CAPSULE ORAL NIGHTLY
Qty: 60 CAPSULE | Refills: 2 | Status: SHIPPED | OUTPATIENT
Start: 2023-11-15

## 2023-11-27 DIAGNOSIS — E78.00 PURE HYPERCHOLESTEROLEMIA: ICD-10-CM

## 2023-11-27 RX ORDER — ATORVASTATIN CALCIUM 10 MG/1
TABLET, FILM COATED ORAL
Qty: 90 TABLET | Refills: 0 | Status: SHIPPED | OUTPATIENT
Start: 2023-11-27

## 2023-11-28 NOTE — TELEPHONE ENCOUNTER
Last VISIT - 8/28/23 f/u for medications     Last CPE - 12/27/22      Last REFILL -     ATORVASTATIN 10 MG Oral Tab 90 tablet 1 6/1/2023     Last LABS - 8/28/23 cbc, A1c, B12    No future appointments. Per PROTOCOL? Failed     Please Approve or Deny.

## 2023-12-01 ENCOUNTER — LAB ENCOUNTER (OUTPATIENT)
Dept: LAB | Age: 73
End: 2023-12-01
Attending: INTERNAL MEDICINE
Payer: MEDICARE

## 2023-12-01 DIAGNOSIS — R73.9 BLOOD GLUCOSE ELEVATED: ICD-10-CM

## 2023-12-01 DIAGNOSIS — Z00.00 ENCOUNTER FOR ANNUAL HEALTH EXAMINATION: ICD-10-CM

## 2023-12-01 DIAGNOSIS — D64.9 NORMOCYTIC ANEMIA: ICD-10-CM

## 2023-12-01 DIAGNOSIS — E78.5 HYPERLIPIDEMIA, UNSPECIFIED HYPERLIPIDEMIA TYPE: ICD-10-CM

## 2023-12-01 DIAGNOSIS — I10 HYPERTENSION, UNSPECIFIED TYPE: ICD-10-CM

## 2023-12-01 DIAGNOSIS — E78.00 PURE HYPERCHOLESTEROLEMIA: ICD-10-CM

## 2023-12-01 LAB
ALBUMIN SERPL-MCNC: 3.8 G/DL (ref 3.4–5)
ALBUMIN/GLOB SERPL: 1.5 {RATIO} (ref 1–2)
ALP LIVER SERPL-CCNC: 95 U/L
ALT SERPL-CCNC: 25 U/L
ANION GAP SERPL CALC-SCNC: 3 MMOL/L (ref 0–18)
AST SERPL-CCNC: 29 U/L (ref 15–37)
BASOPHILS # BLD AUTO: 0.07 X10(3) UL (ref 0–0.2)
BASOPHILS NFR BLD AUTO: 1.3 %
BILIRUB SERPL-MCNC: 0.8 MG/DL (ref 0.1–2)
BUN BLD-MCNC: 18 MG/DL (ref 9–23)
CALCIUM BLD-MCNC: 9 MG/DL (ref 8.5–10.1)
CHLORIDE SERPL-SCNC: 110 MMOL/L (ref 98–112)
CHOLEST SERPL-MCNC: 157 MG/DL (ref ?–200)
CO2 SERPL-SCNC: 26 MMOL/L (ref 21–32)
CREAT BLD-MCNC: 1.05 MG/DL
DEPRECATED HBV CORE AB SER IA-ACNC: 51.4 NG/ML
EGFRCR SERPLBLD CKD-EPI 2021: 56 ML/MIN/1.73M2 (ref 60–?)
EOSINOPHIL # BLD AUTO: 0.37 X10(3) UL (ref 0–0.7)
EOSINOPHIL NFR BLD AUTO: 6.8 %
ERYTHROCYTE [DISTWIDTH] IN BLOOD BY AUTOMATED COUNT: 14 %
EST. AVERAGE GLUCOSE BLD GHB EST-MCNC: 131 MG/DL (ref 68–126)
FASTING PATIENT LIPID ANSWER: YES
FASTING STATUS PATIENT QL REPORTED: YES
GLOBULIN PLAS-MCNC: 2.6 G/DL (ref 2.8–4.4)
GLUCOSE BLD-MCNC: 106 MG/DL (ref 70–99)
HBA1C MFR BLD: 6.2 % (ref ?–5.7)
HCT VFR BLD AUTO: 34.9 %
HDLC SERPL-MCNC: 62 MG/DL (ref 40–59)
HGB BLD-MCNC: 11.5 G/DL
IMM GRANULOCYTES # BLD AUTO: 0.01 X10(3) UL (ref 0–1)
IMM GRANULOCYTES NFR BLD: 0.2 %
IRON SATN MFR SERPL: 29 %
IRON SERPL-MCNC: 121 UG/DL
LDLC SERPL CALC-MCNC: 72 MG/DL (ref ?–100)
LYMPHOCYTES # BLD AUTO: 1.89 X10(3) UL (ref 1–4)
LYMPHOCYTES NFR BLD AUTO: 34.7 %
MCH RBC QN AUTO: 31.2 PG (ref 26–34)
MCHC RBC AUTO-ENTMCNC: 33 G/DL (ref 31–37)
MCV RBC AUTO: 94.6 FL
MONOCYTES # BLD AUTO: 0.44 X10(3) UL (ref 0.1–1)
MONOCYTES NFR BLD AUTO: 8.1 %
NEUTROPHILS # BLD AUTO: 2.66 X10 (3) UL (ref 1.5–7.7)
NEUTROPHILS # BLD AUTO: 2.66 X10(3) UL (ref 1.5–7.7)
NEUTROPHILS NFR BLD AUTO: 48.9 %
NONHDLC SERPL-MCNC: 95 MG/DL (ref ?–130)
OSMOLALITY SERPL CALC.SUM OF ELEC: 290 MOSM/KG (ref 275–295)
PLATELET # BLD AUTO: 176 10(3)UL (ref 150–450)
POTASSIUM SERPL-SCNC: 3.9 MMOL/L (ref 3.5–5.1)
PROT SERPL-MCNC: 6.4 G/DL (ref 6.4–8.2)
RBC # BLD AUTO: 3.69 X10(6)UL
SODIUM SERPL-SCNC: 139 MMOL/L (ref 136–145)
TIBC SERPL-MCNC: 419 UG/DL (ref 240–450)
TRANSFERRIN SERPL-MCNC: 281 MG/DL (ref 200–360)
TRIGL SERPL-MCNC: 133 MG/DL (ref 30–149)
TSI SER-ACNC: 0.54 MIU/ML (ref 0.36–3.74)
VIT B12 SERPL-MCNC: >2000 PG/ML (ref 193–986)
VLDLC SERPL CALC-MCNC: 20 MG/DL (ref 0–30)
WBC # BLD AUTO: 5.4 X10(3) UL (ref 4–11)

## 2023-12-01 PROCEDURE — 82607 VITAMIN B-12: CPT

## 2023-12-01 PROCEDURE — 83550 IRON BINDING TEST: CPT

## 2023-12-01 PROCEDURE — 80053 COMPREHEN METABOLIC PANEL: CPT

## 2023-12-01 PROCEDURE — 82728 ASSAY OF FERRITIN: CPT

## 2023-12-01 PROCEDURE — 80061 LIPID PANEL: CPT

## 2023-12-01 PROCEDURE — 84443 ASSAY THYROID STIM HORMONE: CPT

## 2023-12-01 PROCEDURE — 85025 COMPLETE CBC W/AUTO DIFF WBC: CPT

## 2023-12-01 PROCEDURE — 83540 ASSAY OF IRON: CPT

## 2023-12-01 PROCEDURE — 36415 COLL VENOUS BLD VENIPUNCTURE: CPT

## 2023-12-01 PROCEDURE — 83036 HEMOGLOBIN GLYCOSYLATED A1C: CPT

## 2023-12-12 ENCOUNTER — TELEPHONE (OUTPATIENT)
Dept: UROLOGY | Facility: CLINIC | Age: 73
End: 2023-12-12

## 2023-12-12 ENCOUNTER — VIRTUAL PHONE E/M (OUTPATIENT)
Dept: UROLOGY | Facility: CLINIC | Age: 73
End: 2023-12-12
Attending: OBSTETRICS & GYNECOLOGY
Payer: MEDICARE

## 2023-12-12 DIAGNOSIS — N32.81 DETRUSOR INSTABILITY: ICD-10-CM

## 2023-12-12 DIAGNOSIS — N39.41 URGE INCONTINENCE: ICD-10-CM

## 2023-12-12 DIAGNOSIS — N39.3 FEMALE STRESS INCONTINENCE: Primary | ICD-10-CM

## 2023-12-12 DIAGNOSIS — N81.6 RECTOCELE: ICD-10-CM

## 2023-12-12 NOTE — TELEPHONE ENCOUNTER
Contacted patient to complete check-in process for phone appointment. LVM for patient to call back if there are any questions.   Patient completed e-check in via [employer] Patient Portal

## 2023-12-12 NOTE — PATIENT INSTRUCTIONS
Name Jordi Lopez 36. Treinta Y Frankie 2070 Group Physical Therapy [address] Group Physical Therapy [address] [facility] 750 [facility] Outpatient Rehab [address]10[address] Floor Winifred [address] Medical Group Physical Therapy 1130 [address] 28 [facility] [phone]    [facility] 500 [address] [place] [address] [address]    [facility] 5 N. 340 [facility] [address] 2111 217 [facility] at [address] Medical Group Physical Therapy 9005 Bryn [facility] [address] [place] [address] for [place] [address] [facility]   [facility] 7300 [facility] [address]   [facility] Therapy [address] 1600 [facility] 2N [facility] 1912 [facility] & Physical Therapy [address] 2776 [facility] [phone] Sisi Jeremie Cooper [facility] Therapy [address] 130 W [facility] Physical Therapy [facility] [address]av[address] Manav [facility] 4848 N.  7843 [place] Doron [facility] Physical Therapy [address] at [address] 4000 [place] [address] [phone] Isabella Verde [facility] [address] [place] [address] Dr Merry Boyer [facility] [address] [facility]   [facility] [place] [address] 71 for [facility] 1200 [address] [facility]Nov[facility] [address] [phone] Serene Embs   92 [facility] Medical Group Physical Therapy [address]/Manpreet Services    JUAN [facility] 707 [facility] Dash Randell   [facility] [address] 1019 [facility] Laura Bull [facility] 4772 [facility]   [address] [place] [address] 1100 [facility] [place] at [address] Reda Rosy   92 [facility] Medical Group Physical Therapy [address]    2801 [facility] [address] [facility] [phone] 1430 [facility] at [address] [address] [facility]. Suite [facility] 1490    [facility] Physical Therapy 3784 [facility] [address] [place] [address] Medical Group Physical Therapy 40 S. [facility] [phone]    [facility] 1411 9[facility] [phone] 229 [facility] of [address] hospitals   92 [facility] Medical Group Physical Therapy [address]  120 [facility] Rehab Services 89 [facility] Juany 7 [address]   [facility] Physical [facility] Specialists 219 [facility] [address]   [address] [place] [address] Medical Group Physical Therapy [address] S[address] 2200 [facility] [address] 400 [facility] Medical Group Physical Therapy 430 7727 [facility] [address] 1519 [facility] [phone] [address] for Healthy Living [address]145 Sita Going [facility] Suite 402 [facility] Physical Therapy 1301 [facility] Puolakantie 27 Physical Therapy [facility] AT [facility] D/P [facility] [address] 151 [facility] Street of [address] 4146 [facility] Suite 401 [facility],[address] Leatha [address] Medical Group Physical Therapy [address]    2055 [facility] Group Physical Therapy [address] [id number] [phone]    Owen Muhammad: Analisa Gutierrez [phone]    Rehabilitation Services at [facility] 78 [facility] [address] HNEUDYWT [phone] 5601 [facility] Physical Therapy [address]50 [facility] [phone]    [address] Physical Therapy 200 [facility] 1013 [facility] Physical Therapy [address] 106th [facility] [phone] 295 [facility] Physical Therapy [address] 120 [facility] [address] [phone]    [facility] 320 N.  1500 [facility] [address] [phone]    100 [facility] for Physical Rehabilitation [address] 104 1215 [facility]   1301 [facility] N.E. Bursil[address] 27 Slick Left Medical Group Physical Therapy [address] [facility]: [facility] 104 N. [facility], 2nd Floor [place] [phone]    [address] [place] [address] [phone] [facility] Physical Therapy [facility] 421 [facility] 114   [facility] Physical Therapy 800 [place],4Th Floor [address] [place] [phone]    Rehabilitation Services at [facility] 45 95 [facility] at [facility] [address] 1700 Figu[address] Machado   [facility] [place] Jennifer Garrido   [facility] [address]. [address] 1801 [facility] Physical Therapy [address] [phone] [facility] 6262 [facility] [address] Rowan 6711 [facility],[address] of [place] [address]227 S. [address] [facility] 1610 [facility] Medical Group Physical Therapy [address] S. 5401 [facility] [phone]    [facility] Group Physical Therapy [address] S.  [address] [facility] [address] 601 [facility] 299 [facility] Physical Therapy 611 Huy Walters [phone] Td Treadwell   [address] [place] [address] Medical Group Physical Therapy 2018 [facility] [address] Medical Group Physical Therapy 1000 [facility] Suite 401 Nw 42Nd [facility] Physical Therapy 7 1423 [facility] Suite A [facility] 152 Rehab Services 25 N [address] [facility]   81st Medical Group[address]

## 2023-12-12 NOTE — PROGRESS NOTES
Pt presents w/ initial c/o UI  Urodynamic testing undergone without complication. Results reviewed with patient via telephone  Given circumstances surrounding COVID-19 this visit is being conducted as a televisit with pt's consent. Pt in safe, private environment for televisit, provider located in the office setting    181/45cc & 580/62cc  care home 385cc  DO @ 178cc  MANJULA  @ 100 ml with cough unreduced     Discussed with patient mgmt options for rectocele, MANJULA, DO/UUI  Biggest bother is UI, some bulge sx    Discussed management options for MANJULA including expectant management, pelvic floor PT, pessary, and surgery  Discussed risks and benefits associated with each option    Discussed dietary and behavioral modifications for mgmt of urinary symptoms  Discussed weight management and benefits of weight loss on urinary symptoms  Reviewed AUA/SUFU guidelines on mgmt of non-neurogenic OAB  Discussed pharmacologic and nonpharmacologic mgmt options of urinary symptoms - reviewed risks, benefits, alternatives, and goals of treatment  Discussed specific risks related to OAB meds including, but not limited to dry mouth, constipation, blurry vision, cognitive changes, and BP elevation. Thorough discussion of surgical risks, benefits, and alternatives including, but not limited to bleeding/clots, infection, injury to nearby organs (urethra, bladder, ureters, bowel, blood vessels), mesh erosion, dyspareunia, de phong UUI, worsening MANJULA, recurrence, voiding dysfunction, and pain. Discussed management of pelvic organ prolapse including but not limited to behavioral modifications, conservative options, and surgical management. Discussed pessary management including benefits and risks. Discussed importance of keeping regularly scheduled pessary checks in prevention of complications related to pessary use.      Discussed mgmt of vulvovaginal atrophy with vaginal estrogen cream. Reviewed associated benefits, risks, alternatives, and goals. Recommend low dose twice weekly mgmt   Cont vag estrogen twice weekly    All questions answered.   Pt will proceed PFPT, cont vag estrogen    Follow up after PT, consider pessary if sx persists    Dr. Wil Sheikh

## 2024-01-18 RX ORDER — LOSARTAN POTASSIUM 50 MG/1
50 TABLET ORAL 2 TIMES DAILY
Qty: 180 TABLET | Refills: 0 | Status: SHIPPED | OUTPATIENT
Start: 2024-01-18

## 2024-02-19 RX ORDER — NAPROXEN 500 MG/1
500 TABLET ORAL 2 TIMES DAILY PRN
Qty: 180 TABLET | Refills: 0 | Status: SHIPPED | OUTPATIENT
Start: 2024-02-19

## 2024-02-22 ENCOUNTER — OFFICE VISIT (OUTPATIENT)
Dept: INTERNAL MEDICINE CLINIC | Facility: CLINIC | Age: 74
End: 2024-02-22
Payer: MEDICARE

## 2024-02-22 ENCOUNTER — APPOINTMENT (OUTPATIENT)
Dept: PHYSICAL THERAPY | Facility: HOSPITAL | Age: 74
End: 2024-02-22
Attending: OBSTETRICS & GYNECOLOGY
Payer: MEDICARE

## 2024-02-22 VITALS
WEIGHT: 173.19 LBS | RESPIRATION RATE: 16 BRPM | SYSTOLIC BLOOD PRESSURE: 122 MMHG | HEIGHT: 60.24 IN | OXYGEN SATURATION: 96 % | DIASTOLIC BLOOD PRESSURE: 64 MMHG | BODY MASS INDEX: 33.56 KG/M2 | TEMPERATURE: 99 F

## 2024-02-22 DIAGNOSIS — N28.9 RENAL INSUFFICIENCY: ICD-10-CM

## 2024-02-22 DIAGNOSIS — Z98.890 STATUS POST RESECTION OF MENINGIOMA: ICD-10-CM

## 2024-02-22 DIAGNOSIS — Z78.0 POST-MENOPAUSAL: ICD-10-CM

## 2024-02-22 DIAGNOSIS — D64.9 NORMOCYTIC ANEMIA: ICD-10-CM

## 2024-02-22 DIAGNOSIS — N95.2 VAGINAL ATROPHY: ICD-10-CM

## 2024-02-22 DIAGNOSIS — Z12.31 ENCOUNTER FOR SCREENING MAMMOGRAM FOR MALIGNANT NEOPLASM OF BREAST: ICD-10-CM

## 2024-02-22 DIAGNOSIS — M48.061 SPINAL STENOSIS OF LUMBAR REGION, UNSPECIFIED WHETHER NEUROGENIC CLAUDICATION PRESENT: ICD-10-CM

## 2024-02-22 DIAGNOSIS — R73.9 BLOOD GLUCOSE ELEVATED: ICD-10-CM

## 2024-02-22 DIAGNOSIS — R45.89 DEPRESSED MOOD: ICD-10-CM

## 2024-02-22 DIAGNOSIS — I10 ESSENTIAL HYPERTENSION: ICD-10-CM

## 2024-02-22 DIAGNOSIS — N81.10 PELVIC ORGAN PROLAPSE QUANTIFICATION STAGE 1 CYSTOCELE: ICD-10-CM

## 2024-02-22 DIAGNOSIS — Z86.018 STATUS POST RESECTION OF MENINGIOMA: ICD-10-CM

## 2024-02-22 DIAGNOSIS — Z12.11 SCREEN FOR COLON CANCER: ICD-10-CM

## 2024-02-22 DIAGNOSIS — Z00.00 ENCOUNTER FOR ANNUAL WELLNESS EXAM IN MEDICARE PATIENT: Primary | ICD-10-CM

## 2024-02-22 DIAGNOSIS — E78.2 MIXED HYPERLIPIDEMIA: ICD-10-CM

## 2024-02-22 NOTE — PROGRESS NOTES
Subjective:   Glenn Howard is a 73 year old female who presents for a Medicare Subsequent Annual Wellness visit (Pt already had Initial Annual Wellness) and scheduled follow up of multiple significant but stable problems.       Patient with HTN, white coat syndrome, HL, chronic pain from spinal stenosis and arthritis, meningioma s/p resection, chronic intermittent vertigo here for wellness.  Chronic issues stable, LBP is doing better.  Due for cscope- referral given, stressed to schedule    History/Other:   Fall Risk Assessment:   She has been screened for Falls and is High Risk. Fall Prevention information provided to patient in After Visit Summary.    Do you feel unsteady when standing or walking?: Yes  Do you worry about falling?: Yes  Have you fallen in the past year?: No     Cognitive Assessment:   She had a completely normal cognitive assessment - see flowsheet entries     Functional Ability/Status:   Glnen Howard has some abnormal functions as listed below:  She has Dressing and/or Bathing issues based on screening of functional status.  Difficulty dressing or bathing?: Yes  Bathing or Showering: Able without help  Dressing: Able without help  She has Driving difficulties based on screening of functional status. She has Meal Preparation difficulties based on screening of functional status.She has difficulties Shopping for Groceries based on screening of functional status. She has difficulties Taking Meds as Rx'd based on screening of functional status. She has Vision problems based on screening of functional status. She has Walking problems based on screening of functional status. She has problems with Daily Activities based on screening of functional status.       Depression Screening (PHQ-2/PHQ-9): PHQ-2 SCORE: 0  , done 2/22/2024   Last [place] Suicide Screening on 2/22/2024 was No Risk.         Advanced Directives:   She does NOT have a Living Will. [Do you have a living will?: No]  She  does NOT have a Power of  for Health Care. [Do you have a healthcare power of ?: No]  Not discussed      Patient Active Problem List   Diagnosis    HTN (hypertension)    Hyperlipidemia    Status post resection of meningioma    Depressed mood    Spinal stenosis of lumbar region    Vaginal atrophy    Pelvic organ prolapse quantification stage 1 cystocele    Vaginal prolapse without uterine prolapse    Vertigo    Back pain with radiculopathy    Primary osteoarthritis of both knees     Allergies:  She is allergic to diphenhydramine; heparin; penicillins; benadryl [diphenhydramine hcl]; heparin, porcine; and penicillin g.    Current Medications:  Outpatient Medications Marked as Taking for the 2/22/24 encounter (Office Visit) with Yanet Mcgowan MD   Medication Sig    NAPROXEN 500 MG Oral Tab TAKE 1 TABLET(500 MG) BY MOUTH TWICE DAILY WITH FOOD AS NEEDED    LOSARTAN 50 MG Oral Tab TAKE 1 TABLET(50 MG) BY MOUTH TWICE DAILY    [DISCONTINUED] atorvastatin 10 MG Oral Tab TAKE 1 TABLET(10 MG) BY MOUTH EVERY NIGHT    GABAPENTIN 100 MG Oral Cap TAKE 2 CAPSULES(200 MG) BY MOUTH EVERY NIGHT    furosemide 40 MG Oral Tab Take 1 tablet (40 mg total) by mouth daily.    aspirin 81 MG Oral Tab EC Take 1 tablet (81 mg total) by mouth daily.    DULOXETINE 60 MG Oral Cap DR Particles TAKE 1 CAPSULE(60 MG) BY MOUTH DAILY    carvedilol 6.25 MG Oral Tab TAKE 1 TABLET BY MOUTH EVERY MORNING AND 2 TABLETS EVERY EVENING.    RHOPRESSA 0.02 % Ophthalmic Solution 1 drop by Each eye route nightly.    estradiol 0.1 MG/GM Vaginal Cream Place 1 g vaginally twice a week.    brimonidine 0.2 % Ophthalmic Solution Place 300 drops into both eyes 2 (two) times daily.    LATANOPROST 0.005 % Ophthalmic Solution INSTILL 1 DROP IN BOTH EYES EVERY NIGHT    Dorzolamide HCl-Timolol Mal 22.3-6.8 MG/ML Ophthalmic Solution      Current Facility-Administered Medications for the 2/22/24 encounter (Office Visit) with Yanet Mcgowan MD   Medication     lidocaine HCl (XYLOCAINE) 1 % injection 5 mL    triamcinolone acetonide (KENALOG-40) 40 MG/ML injection 40 mg       Medical History:  She  has a past medical history of Anemia, Arthritis, Atherosclerosis of coronary artery, Back pain, Cataract, Eczema, Essential hypertension, Fibromyalgia, Glaucoma, Hyperlipidemia, Impaired vision, Meningioma (HCC), Osteoarthritis, Pure hypercholesterolemia, and Seizure disorder (HCC).  Surgical History:  She  has a past surgical history that includes biopsy of thyroid,percut (12/15/2011); cataract; angiogram; hysterectomy; ; and excis infratent meningioma (2018).   Family History:  Her family history includes Breast Cancer (age of onset: 52) in her mother; Diabetes in her father; Heart Attack in her father; Uterine Cancer in her mother.  Social History:  She  reports that she has never smoked. She has never been exposed to tobacco smoke. She has never used smokeless tobacco. She reports that she does not drink alcohol and does not use drugs.    Tobacco:  She has never smoked tobacco.    CAGE Alcohol Screen:   CAGE screening score of 0 on 2024, showing low risk of alcohol abuse.      Patient Care Team:  Yanet Mcgowan MD as PCP - General  David Jordan PT as Physical Therapist  Pratima Parra MD as Obstetrician (OBSTETRICS & GYNECOLOGY)  Ivory Oliveira PT as Physical Therapist (Physical Therapy)  Ginger Barron PT as Physical Therapist    Review of Systems     Negative except chronic LBP, no CP/SOB/DZ    Objective:   Physical Exam  General Appearance:  Alert, cooperative, no distress, appears stated age   Head:  Normocephalic, without obvious abnormality, atraumatic   Eyes:  PERRL, conjunctiva/corneas clear, EOM's intact both eyes   Ears:  Normal TM's and external ear canals, both ears   Nose: Nares normal, septum midline,mucosa normal   Throat: Lips, mucosa, and tongue normal; teeth and gums normal   Neck: Supple, symmetrical, trachea midline, no  adenopathy;  thyroid: not enlarged, symmetric, no tenderness/mass/nodules; no carotid bruit or JVD   Back:   Mild TTP lumbar spine   Lungs:   Clear to auscultation bilaterally, respirations unlabored   Heart:  Regular rate and rhythm, S1 and S2 normal   Abdomen:   Soft, non-tender, ND, nl BS   Pelvic: Deferred   Extremities: Extremities normal, atraumatic, no cyanosis or edema   Pulses: 2+ and symmetric   Skin: Skin color, texture, turgor normal, no rashes or lesions   Lymph nodes: Cervical, supraclavicular, and axillary nodes normal   Neurologic: Alert and oriented       /64 (BP Location: Left arm, Patient Position: Sitting, Cuff Size: large)   Temp 98.7 °F (37.1 °C) (Temporal)   Resp 16   Ht 5' 0.24\" (1.53 m)   Wt 173 lb 3.2 oz (78.6 kg)   LMP  (LMP Unknown)   SpO2 96%   BMI 33.56 kg/m²  Estimated body mass index is 33.56 kg/m² as calculated from the following:    Height as of this encounter: 5' 0.24\" (1.53 m).    Weight as of this encounter: 173 lb 3.2 oz (78.6 kg).    Medicare Hearing Assessment:   Hearing Screening    Time taken: 2/22/2024  1:00 PM  Entry User: Ericka Oscar MA  Screening Method: Finger Rub  Finger Rub Result: Pass               Visual Acuity:   Right Eye Visual Acuity: Uncorrected Right Eye Chart Acuity: 20/50   Left Eye Visual Acuity: Uncorrected Left Eye Chart Acuity: 20/70   Both Eyes Visual Acuity: Uncorrected Both Eyes Chart Acuity: 20/30   Able To Tolerate Visual Acuity: Yes        Assessment & Plan:   Glenn Howard is a 73 year old female who presents for a Medicare Assessment.     1. Encounter for annual wellness exam in Medicare patient (Primary)- referred for dexa, colonoscopy and mammogram.  2. Encounter for screening mammogram for malignant neoplasm of breast  -     [facility] CHRIS 2D+3D SCREENING BILAT (CPT=77067/64708); Future; Expected date: 02/22/2024  3. Post-menopausal  -     XR DEXA BONE DENSITOMETRY (CPT=77080); Future; Expected date: 02/22/2024  4.  Normocytic anemia- referred to SGI, monitor CBC  -     Gastro Referral - In Network  -     CBC With Differential With Platelet; Future; Expected date: 06/01/2024  5. Screen for colon cancer  -     Gastro Referral - In Network  6. Essential hypertension- controlled, CPM  -     Basic Metabolic Panel (8); Future; Expected date: 06/01/2024  7. Blood glucose elevated, pre dm- watch diet, monitor hgba1c  -     Hemoglobin A1C; Future; Expected date: 06/01/2024  8. Spinal stenosis of lumbar region, unspecified whether neurogenic claudication present- stable, continue gabapentin  9. Depressed mood- doing well, continue duloxetine  10. Mixed hyperlipidemia- controlled on atorvastatin  11. Pelvic organ prolapse quantification stage 1 cystocele- f/u urogyne as directed  12. Status post resection of meningioma- stable, no HA or other neurologic complaints  13. Renal insufficiency- push fluids, repeat BMP  -     Basic Metabolic Panel (8); Future; Expected date: 06/01/2024  14. Vaginal atrophy- continue estradiol cream    The patient indicates understanding of these issues and agrees to the plan.  Continue with current treatment plan.  Reinforced healthy diet, lifestyle, and exercise.      Return in about 5 months (around 7/8/2024), or if symptoms worsen or fail to improve, for f/u after labs for chronic issues.     Yanet Mcgowan MD, 2/22/2024     Supplementary Documentation:   General Health:  In the past six months, have you lost more than 10 pounds without trying?: 2 - No  Has your appetite been poor?: No  Type of Diet: Balanced  How does the patient maintain a good energy level?: Stretching  How would you describe your daily physical activity?: Light  How would you describe your current health state?: Poor  How do you maintain positive mental well-being?: Social Interaction  On a scale of 0 to 10, with 0 being no pain and 10 being severe pain, what is your pain level?: 4 - (Moderate)  In the past six months, have you  experienced urine leakage?: 1-Yes  At any time do you feel concerned for the safety/well-being of yourself and/or your children, in your home or elsewhere?: No  Have you had any immunizations at another office such as Influenza, Hepatitis B, Tetanus, or Pneumococcal?: Yes       Glenn Howard's SCREENING SCHEDULE   Tests on this list are recommended by your physician but may not be covered, or covered at this frequency, by your insurer.   Please check with your insurance carrier before scheduling to verify coverage.   PREVENTATIVE SERVICES FREQUENCY &  COVERAGE DETAILS LAST COMPLETION DATE   Diabetes Screening    Fasting Blood Sugar /  Glucose    One screening every 12 months if never tested or if previously tested but not diagnosed with pre-diabetes   One screening every 6 months if diagnosed with pre-diabetes Lab Results   Component Value Date     (H) 12/01/2023        Cardiovascular Disease Screening    Lipid Panel  Cholesterol  Lipoprotein (HDL)  Triglycerides Covered every 5 years for all Medicare beneficiaries without apparent signs or symptoms of cardiovascular disease Lab Results   Component Value Date    CHOLEST 157 12/01/2023    HDL 62 (H) 12/01/2023    LDL 72 12/01/2023    TRIG 133 12/01/2023         Electrocardiogram (EKG)   Covered if needed at Welcome to Medicare, and non-screening if indicated for medical reasons 04/04/2022      Ultrasound Screening for Abdominal Aortic Aneurysm (AAA) Covered once in a lifetime for one of the following risk factors    Men who are 65-75 years old and have ever smoked    Anyone with a family history -     Colorectal Cancer Screening  Covered for ages 50-85; only need ONE of the following:    Colonoscopy   Covered every 10 years    Covered every 2 years if patient is at high risk or previous colonoscopy was abnormal -    Health Maintenance   Topic Date Due    Colorectal Cancer Screening  11/29/2022       Flexible Sigmoidoscopy   Covered every 4 years -     Fecal Occult Blood Test Covered annually -   Bone Density Screening    Bone density screening    Covered every 2 years after age 65 if diagnosed with risk of osteoporosis or estrogen deficiency.    Covered yearly for long-term glucocorticoid medication use (Steroids) Last Dexa Scan:    XR DEXA BONE DENSITOMETRY (CPT=77080) 11/15/2018      No recommendations at this time   Pap and Pelvic    Pap   Covered every 2 years for women at normal risk; Annually if at high risk -  No recommendations at this time    Chlamydia Annually if high risk -  No recommendations at this time   Screening Mammogram    Mammogram     Recommend annually for all female patients aged 40 and older    One baseline mammogram covered for patients aged 35-39 11/16/2022    Health Maintenance   Topic Date Due    Mammogram  11/16/2023       Immunizations    Influenza Covered once per flu season  Please get every year 11/17/2023  No recommendations at this time    Pneumococcal Each vaccine (Xetslvv46 & Bchnylfss80) covered once after 65 Prevnar 13: 07/15/2015    Budwxvmhp88: 06/04/2014     No recommendations at this time    Hepatitis B One screening covered for patients with certain risk factors   -  No recommendations at this time    Tetanus Toxoid Not covered by Medicare Part B unless medically necessary (cut with metal); may be covered with your pharmacy prescription benefits -    Tetanus, Diptheria and Pertusis TD and TDaP Not covered by Medicare Part B -  No recommendations at this time    Zoster Not covered by Medicare Part B; may be covered with your pharmacy  prescription benefits 01/01/2013  No recommendations at this time     Annual Monitoring of Persistent Medications (ACE/ARB, digoxin diuretics, anticonvulsants)    Potassium Annually Lab Results   Component Value Date    K 3.9 12/01/2023         Creatinine   Annually Lab Results   Component Value Date    CREATSERUM 1.05 (H) 12/01/2023         BUN Annually Lab Results   Component Value Date     BUN 18 12/01/2023       Drug Serum Conc Annually No results found for: \"DIGOXIN\", \"DIG\", \"VALP\"

## 2024-02-23 DIAGNOSIS — E78.00 PURE HYPERCHOLESTEROLEMIA: ICD-10-CM

## 2024-02-24 RX ORDER — ATORVASTATIN CALCIUM 10 MG/1
TABLET, FILM COATED ORAL
Qty: 90 TABLET | Refills: 0 | Status: SHIPPED | OUTPATIENT
Start: 2024-02-24

## 2024-02-25 PROBLEM — R60.0 BILATERAL LEG EDEMA: Status: RESOLVED | Noted: 2022-12-27 | Resolved: 2024-02-25

## 2024-02-25 PROBLEM — R25.2 LEG CRAMPS: Status: RESOLVED | Noted: 2018-08-10 | Resolved: 2024-02-25

## 2024-02-28 ENCOUNTER — OFFICE VISIT (OUTPATIENT)
Dept: PHYSICAL THERAPY | Facility: HOSPITAL | Age: 74
End: 2024-02-28
Attending: OBSTETRICS & GYNECOLOGY
Payer: MEDICARE

## 2024-02-28 DIAGNOSIS — N32.81 DETRUSOR INSTABILITY: ICD-10-CM

## 2024-02-28 DIAGNOSIS — N81.6 RECTOCELE: ICD-10-CM

## 2024-02-28 DIAGNOSIS — N39.41 URGE INCONTINENCE: ICD-10-CM

## 2024-02-28 DIAGNOSIS — N39.3 FEMALE STRESS INCONTINENCE: Primary | ICD-10-CM

## 2024-02-28 PROCEDURE — 97162 PT EVAL MOD COMPLEX 30 MIN: CPT

## 2024-02-28 PROCEDURE — 97112 NEUROMUSCULAR REEDUCATION: CPT

## 2024-02-28 NOTE — PROGRESS NOTES
MUSCULOSKELETAL AND PELVIC FLOOR EVALUATION:     Diagnosis:   Female stress incontinence (N39.3)  Rectocele (N81.6)  Urge incontinence (N39.41)  Detrusor instability (N32.81)      Referring Provider: Juvencio  Date of Evaluation:    2/28/2024    Precautions:  None Next MD visit:   none scheduled  Date of Surgery: n/a     PATIENT SUMMARY   Glenn Howard is a 73 year old female  who presents to therapy today with c/o of prolapse for 1 year.  Had an injection 6 months ago for her back. Chronic LBP and L LE weakness from meningioma 2016.  Current symptoms include: back pain, constipation, leakage, and pelvic pressure    Pt describes pain level: current 0/10, best 0/10, worst 6/10. -back pain with occasional L LE radicular symptoms    Occupation/Activities: helps with grandchildren, light stretching, walking  PFDI-20: 119.8/300    Glenn describes prior level of function less prolapse. Pt goals include learn exs to decrease prolapse.  Past medical history was reviewed with Glenn. Significant findings include  has a past medical history of Anemia, Arthritis, Atherosclerosis of coronary artery, Back pain, Cataract, Eczema, Essential hypertension, Fibromyalgia, Glaucoma, Hyperlipidemia, Impaired vision, Meningioma (HCC), Osteoarthritis, Pure hypercholesterolemia, and Seizure disorder ([facility]).       URINARY HABITS  Types of symptoms: stress incontinence  Events associated with the onset of urinary complaints: n/a  Abdominal/Vaginal Pressure complaints: yes  Urinary Frequency: 6x/day  Urine Stream: strong  Amount: normal  Leaking occurs: 1x/every other month  Episodes of Leakage: 1x times per every other month  Amount of leakage: min  Pad use: pantyliner  Pad Change frequency: as needed  Nocturia: 1x  Fluid Intake: water  Bladder irritants: 2x/day  Hovering: no  Empty bladder just in case: yes  Do you ever leak urine without knowing it? no    BOWEL HABITS  Types of symptoms: Constipation   Frequency of bowel movements:  1x/day, some times   Stool consistency: [place] Stool Scale: 3  Do you strain with defecation: Yes   Laxative use: No    SEXUAL HEALTH STATUS  No pain    ASSESSMENT  Glenn presents to physical therapy evaluation with primary c/o prolapse pressure. The results of the objective tests and measures show  decreased pelvic floor muscle strength and coordination, fair bowel and bladder habits, decreased core strength, decreased L>R hip strength .  Functional deficits include but are not limited to embarrassment with leakage .  Signs and symptoms are consistent with diagnosis of Female stress incontinence (N39.3), Rectocele (N81.6), Urge incontinence (N39.41), Detrusor instability (N32.81)   . Pt and PT discussed evaluation findings, pathology, POC and HEP.  Pt voiced understanding and performs HEP correctly without reported pain. Skilled Pelvic Physical Therapy is medically necessary to address the above impairments and reach functional goals.    OBJECTIVE:   Posture: slouched sitting, standing-trunk extension for balance  Pelvic Alignment: symmetrical   Gait: pt ambulates on level ground with assistive device of cane R, slow maris .     External Palpation: B lumbar paraspinal tension  Abdominal Wall Integrity: intact  Diastasis Recti: no    Range Of Motion  Lumbar AROM screen: flexion wnl, ext 10-mild LOB, pain, SB 75%  LE AROM screen: grossly WNL     Strength (MMT)  L hip 4-/5 R hip 4+/5  Transverse Abdominis: 1/5    Informed consent for internal pelvic evaluation given: No    External Observation: deferred per pt    Sensory/Reflex:  intact light touch, symmetrically, no neuro complaints pelvic floor muscles     Internal Examination -deferred per pt    Internal Palpation: deferred per pt    Today's Treatment and Response:   Patient education was provided on objective findings of external and internal evaluation and expectations with treatment outcomes. Educated on pelvic anatomy and function with diagrams and pelvic  model, bladder normatives, adequate hydration levels, proper toileting posture, instructed in bladder, bowel, and diet diary log and issued handout , stress/urge urinary incontinence strategies, coordination of diaphragmatic breathing and pelvic floor contraction , and knack/pelvic muscle brace, prolapse do's and dont's , pressure management    NM Re-ed- internal retraining pelvic floor muscles to learn to perform diaphragmatic breathing , abdominal bracing and pelvic floor muscle contraction-supine and sitting     Patient was instructed in and issued a HEP for: diet/bladder/bowel diary , diaphragmatic breathing x10, kegel x10, abdominal bracing x10 TID    Charges: PT Eval Moderate Complexity, NMR (Jean incorporated)      Total Timed Treatment: 20 min     Total Treatment Time: 45 min     Based on clinical rationale and outcome measures, this evaluation involved Moderate Complexity decision making due to 1-2 personal factors/comorbidities, 3 body structures involved/activity limitations, and evolving symptoms including stress urinary incontinence and pelvic organ prolapse  PLAN OF CARE:    Goals: (to be met in 10 visits)  1. Independent in HEP and progression with improved understanding of bladder/bowel health, bladder retraining and long-term management.    2. Patient will demonstrate improved bladder voiding habits to voiding every 2 hours without urinary leakage.  3. Patient will demonstrate improve PFM isolation, coordination and strength so she is able to reduce urinary urge and prevent leakage during ADL's.  4. Pt will have increased transverse abdominis muscle strength to 2/5 and improved pressure management to assist with supporting pelvic floor muscles.   5. PFM contraction before increase intra-abdominal pressure.      Frequency / Duration: Patient will be seen for 1 x/week or a total of 10 visits over a 90 day period.  Treatment will include: Manual Therapy, Neuromuscular Re-education, Self-Care Home  Management, Therapeutic Activities, Therapeutic Exercise, and Home Exercise Program instruction     Education or treatment limitation: None  Rehab Potential:good      Patient/Family/Caregiver was advised of these findings, precautions, and treatment options and has agreed to actively participate in planning and for this course of care.    Thank you for your referral. Please co-sign or sign and return this letter via fax as soon as possible to [phone]. If you have any questions, please contact me at Dept: [phone]    Sincerely,  Electronically signed by therapist: Ivory Oliveira PT  Physician's certification required: Yes  I certify the need for these services furnished under this plan of treatment and while under my care.    X___________________________________________________ Date____________________    Certification From: 2/28/2024  To:5/28/2024

## 2024-03-06 ENCOUNTER — OFFICE VISIT (OUTPATIENT)
Dept: PHYSICAL THERAPY | Facility: HOSPITAL | Age: 74
End: 2024-03-06
Attending: OBSTETRICS & GYNECOLOGY
Payer: MEDICARE

## 2024-03-06 PROCEDURE — 97112 NEUROMUSCULAR REEDUCATION: CPT

## 2024-03-06 PROCEDURE — 97110 THERAPEUTIC EXERCISES: CPT

## 2024-03-06 NOTE — PROGRESS NOTES
Diagnosis:   Female stress incontinence (N39.3)  Rectocele (N81.6)  Urge incontinence (N39.41)  Detrusor instability (N32.81)         Referring Provider: Juvencio  Date of Evaluation:    2/28/2024    Precautions:       Chronic LBP and L LE weakness from meningioma 2016. Next MD visit:   none scheduled  Date of Surgery: n/a   Insurance Primary/Secondary: MEDICARE / [employer]     # Auth Visits: [email or web address]            Subjective:   Still having constipation.   No sciatic pain after doing exercises.   No urine leakage       Current symptoms include: back pain, constipation, leakage, and pelvic pressure-2/28/2024    PFDI-20: 119.8/300-2/28/2024    Pain: 0/10      Objective: Tx flow sheet     2/28/2024  URINARY HABITS  Types of symptoms: stress incontinence  Abdominal/Vaginal Pressure complaints: yes  Urinary Frequency: 6x/day  Leaking occurs: 1x/every other month  Episodes of Leakage: 1x times per every other month  Amount of leakage: min  Pad use: pantyliner  Nocturia: 1x  Fluid Intake: water  Bladder irritants: 2x/day  Empty bladder just in case: yes      BOWEL HABITS  Types of symptoms: Constipation   Frequency of bowel movements: 1x/day, some times   Stool consistency: [place] Stool Scale: 3  Do you strain with defecation: Yes       SEXUAL HEALTH STATUS  No pain    Posture: slouched sitting, standing-trunk extension for balance  Pelvic Alignment: symmetrical   Gait: pt ambulates on level ground with assistive device of cane R, slow maris.     External Palpation: B lumbar paraspinal tension    Range Of Motion  Lumbar AROM screen: flexion wnl, ext 10-mild LOB, pain, SB 75%  LE AROM screen: grossly WNL     Strength (MMT)  L hip 4-/5 R hip 4+/5  Transverse Abdominis: 1/5    Informed consent for internal pelvic evaluation given: No    External observation/Internal Examination/palpation -deferred per pt        Assessment:   Improved coordination of pelvic floor muscles via fascially connected mm.  Upgraded HEP.  No  adverse reaction reported.      Goals:   Goals: (to be met in 10 visits)-progressing  1. Independent in HEP and progression with improved understanding of bladder/bowel health, bladder retraining and long-term management.    2. Patient will demonstrate improved bladder voiding habits to voiding every 2 hours without urinary leakage.  3. Patient will demonstrate improve PFM isolation, coordination and strength so she is able to reduce urinary urge and prevent leakage during ADL's.  4. Pt will have increased transverse abdominis muscle strength to 2/5 and improved pressure management to assist with supporting pelvic floor muscles.   5. PFM contraction before increase intra-abdominal pressure.    Plan: Continue plan of care as pt tolerates with focus on pelvic floor muscles, bladder and bowel habits.   Date: 3/6/2024  TX#: 2/10 Date:                 TX#: 3/ Date:                 TX#: 4/ Date:                 TX#: 5/ Date:   Tx#: 6/   PFM NM  Re-ed     Subj assessment      Bowel/bladder habits    Julissa/phys pelvic floor muscles /diaphragm/fascially connected mm     PFM NM  Re-ed  PFM NM  Re-ed  PFM NM  Re-ed  PFM NM  Re-ed    Ther Ex:     NuStep 5min L2    diaphragmatic breathing   abdominal bracing   kegel    Kegel +  iso hip add  10\" x10     Kegel + articulating bridge x10 w iso hip add    Kegel +  Resisted   ER RTB  x10     Kegel + clamshell RTB x10 R/L      Kegel +  Resisted   ER RTB  x10     Kegel with plie/ER resisted RTB x10     Issued HEP and RTB         Ther Ex:  Ther Ex:  Ther Ex:  Ther Ex:    HEP: diaphragmatic breathing x10, kegel x10, abdominal bracing x10 TID    Charges: NMR 15', Tex2 30'       Total Timed Treatment: 45 min  Total Treatment Time: 45 min

## 2024-03-10 DIAGNOSIS — G89.29 OTHER CHRONIC PAIN: ICD-10-CM

## 2024-03-11 RX ORDER — DULOXETIN HYDROCHLORIDE 60 MG/1
CAPSULE, DELAYED RELEASE ORAL
Qty: 90 CAPSULE | Refills: 1 | Status: SHIPPED | OUTPATIENT
Start: 2024-03-11

## 2024-03-13 ENCOUNTER — OFFICE VISIT (OUTPATIENT)
Dept: PHYSICAL THERAPY | Facility: HOSPITAL | Age: 74
End: 2024-03-13
Attending: OBSTETRICS & GYNECOLOGY
Payer: MEDICARE

## 2024-03-13 PROCEDURE — 97112 NEUROMUSCULAR REEDUCATION: CPT

## 2024-03-13 PROCEDURE — 97110 THERAPEUTIC EXERCISES: CPT

## 2024-03-13 NOTE — PROGRESS NOTES
Diagnosis:   Female stress incontinence (N39.3)  Rectocele (N81.6)  Urge incontinence (N39.41)  Detrusor instability (N32.81)         Referring Provider: Juvencio  Date of Evaluation:    2/28/2024    Precautions:       Chronic LBP and L LE weakness from meningioma 2016. Next MD visit:   none scheduled  Date of Surgery: n/a   Insurance Primary/Secondary: MEDICARE / [employer]     # Auth Visits: [email or web address]            Subjective:   No nocturia.  Feels like bladder is emptying better.  No sciatic pain after doing exercises.   No urine leakage .   Doing HEP.  Not straining with urinating.      Current symptoms include: back pain, constipation, leakage, and pelvic pressure-2/28/2024    PFDI-20: 119.8/300-2/28/2024    Pain: 0/10      Objective: Tx flow sheet     2/28/2024  URINARY HABITS  Types of symptoms: stress incontinence  Abdominal/Vaginal Pressure complaints: yes  Urinary Frequency: 6x/day  Leaking occurs: 1x/every other month  Episodes of Leakage: 1x times per every other month  Amount of leakage: min  Pad use: pantyliner  Nocturia: 0x (was:1x)-3/13/2024   Bladder irritants: 2x/day  Empty bladder just in case: yes      BOWEL HABITS  Types of symptoms: Constipation   Frequency of bowel movements: 1x/day, some times   Stool consistency: [place] Stool Scale: 3  Do you strain with defecation: Yes       SEXUAL HEALTH STATUS  No pain    Posture: slouched sitting, standing-trunk extension for balance  Pelvic Alignment: symmetrical   Gait: pt ambulates on level ground with assistive device of cane R, slow maris.     External Palpation: B lumbar paraspinal tension    Range Of Motion  Lumbar AROM screen: flexion wnl, ext 10-mild LOB, pain, SB 75%  LE AROM screen: grossly WNL     Strength (MMT)  L hip 4-/5 R hip 4+/5  Transverse Abdominis: 1/5    Informed consent for internal pelvic evaluation given: No    External observation/Internal Examination/palpation -deferred per pt        Assessment:   Improved coordination of  pelvic floor muscles via fascially connected mm with simulated activities of daily living .  Pt appears to understand strategies to decrease urge throughout the day with urge deferment.  No adverse reaction reported.      Goals:   Goals: (to be met in 10 visits)-progressing  1. Independent in HEP and progression with improved understanding of bladder/bowel health, bladder retraining and long-term management.    2. Patient will demonstrate improved bladder voiding habits to voiding every 2 hours without urinary leakage.  3. Patient will demonstrate improve PFM isolation, coordination and strength so she is able to reduce urinary urge and prevent leakage during ADL's.  4. Pt will have increased transverse abdominis muscle strength to 2/5 and improved pressure management to assist with supporting pelvic floor muscles.   5. PFM contraction before increase intra-abdominal pressure.    Plan: Continue plan of care as pt tolerates with focus on pelvic floor muscles, bladder and bowel habits.   Date: 3/6/2024  TX#: 2/10 Date:           3/13/2024       TX#: 3/ Date:                 TX#: 4/ Date:                 TX#: 5/ Date:   Tx#: 6/   PFM NM  Re-ed     Subj assessment      Bowel/bladder habits    Julissa/phys pelvic floor muscles /diaphragm/fascially connected mm     PFM NM  Re-ed     Subj assessment      Bowel/bladder habits    Urge deferment  False urges    Julissa/phys pelvic floor muscles /diaphragm/fascially connected mm PFM NM  Re-ed  PFM NM  Re-ed  PFM NM  Re-ed    Ther Ex:     NuStep 5min L2    diaphragmatic breathing   abdominal bracing   kegel    Kegel +  iso hip add  10\" x10     Kegel + articulating bridge x10 w iso hip add    Kegel +  Resisted   ER RTB  x10     Kegel + clamshell RTB x10 R/L        Kegel with plie/ER resisted RTB x10     Issued HEP and RTB         Ther Ex:     NuStep 5min L2    diaphragmatic breathing   abdominal bracing   kegel    Kegel +  iso hip add  10\" x10     Kegel + articulating bridge x10 w iso  hip add    Kegel +  Resisted   ER RTB  x10     Kegel + clamshell RTB x10 R/L        Kegel +  U/LE lift  x10        Ther Ex:  Ther Ex:  Ther Ex:    HEP: diaphragmatic breathing x10, kegel x10, abdominal bracing x10 TID    Charges: NMR 15', Tex2 30'       Total Timed Treatment: 45 min  Total Treatment Time: 45 min

## 2024-03-18 ENCOUNTER — OFFICE VISIT (OUTPATIENT)
Dept: UROLOGY | Facility: CLINIC | Age: 74
End: 2024-03-18
Attending: OBSTETRICS & GYNECOLOGY
Payer: MEDICARE

## 2024-03-18 VITALS — RESPIRATION RATE: 18 BRPM | BODY MASS INDEX: 34 KG/M2 | TEMPERATURE: 97 F | HEIGHT: 60.24 IN

## 2024-03-18 DIAGNOSIS — N81.6 RECTOCELE: ICD-10-CM

## 2024-03-18 DIAGNOSIS — N39.41 URGE INCONTINENCE: ICD-10-CM

## 2024-03-18 DIAGNOSIS — N39.3 FEMALE STRESS INCONTINENCE: Primary | ICD-10-CM

## 2024-03-18 DIAGNOSIS — N32.81 DETRUSOR INSTABILITY: ICD-10-CM

## 2024-03-18 DIAGNOSIS — N95.2 POSTMENOPAUSAL ATROPHIC VAGINITIS: ICD-10-CM

## 2024-03-18 DIAGNOSIS — N81.84 PELVIC MUSCLE WASTING: ICD-10-CM

## 2024-03-18 PROCEDURE — 99212 OFFICE O/P EST SF 10 MIN: CPT

## 2024-03-18 NOTE — PROGRESS NOTES
Patient presents to follow up PFPT    Has completed 4 sessions, add'l scheduled  Reports 60% improvement thus far, particularly MANJULA sx    Using vag estrogen as rx  Bowels reg    Happy  Doesn't want to try pessary    UDS 11/6/23:  181/45cc & 580/62cc  FPC 385cc  DO @ 178cc  MANJULA  @ 100 ml with cough unreduced     Urogynecology Summary:  Urogynecology Summary  Prolapse: No  MANJULA: No  Urge Incontinence: No  Nocturia Frequency: 1  Frequency: 1 - 2 hours  Incomplete emptying: No  Constipation: Yes  Wears pad day?: 1 (light)  Wears Pad Night?: 1 (light)  Activities are limited by UI/POP?: Yes (both)  Currently Sexually Active: No  Avoids sexual activity due to: Other (pain and bulging)    Vitals:  Temp 97.3 °F (36.3 °C)   Resp 18   Ht 60.24\"   LMP  (LMP Unknown)   BMI 33.56 kg/m²     GENERAL EXAM:  GENERAL: alert & oriented, NAD  HEENT: NC/AT, sclera without injection  SKIN: no rashes  LUNGS:  without increased respiratory effort  EXT: no edema    PELVIC EXAM:  Deferred     Impression/Plan:    ICD-10-CM    1. Female stress incontinence  N39.3       2. Urge incontinence  N39.41       3. Rectocele  N81.6       4. Detrusor instability  N32.81       5. Postmenopausal atrophic vaginitis  N95.2       6. Pelvic muscle wasting  N81.84           Discussion Items:   Discussed mgmt of vulvovaginal atrophy with vaginal estrogen cream. Reviewed associated benefits, risks, alternatives, and goals. Recommend low dose 2-3x/week treatment.    Mentioned OAB meds, not interested at this time.    Mentioned pessary for prolapse, MANJULA  Not interested at this time    Treatment Plan:  Continue PFPT  Continue vag estrogen cream as prescribed  Bowel regimen  Bladder diet/drill  Pelvic floor exercises  Call with s/sx of UTI    All questions answered  She understands and agrees to plan    Return in about 3 months (around 6/18/2024) for PFPT f/u (phone).    Katina Cameron PA-C

## 2024-03-20 ENCOUNTER — OFFICE VISIT (OUTPATIENT)
Dept: PHYSICAL THERAPY | Facility: HOSPITAL | Age: 74
End: 2024-03-20
Attending: OBSTETRICS & GYNECOLOGY
Payer: MEDICARE

## 2024-03-20 PROCEDURE — 97110 THERAPEUTIC EXERCISES: CPT

## 2024-03-20 PROCEDURE — 97112 NEUROMUSCULAR REEDUCATION: CPT

## 2024-03-20 NOTE — PROGRESS NOTES
Diagnosis:   Female stress incontinence (N39.3)  Rectocele (N81.6)  Urge incontinence (N39.41)  Detrusor instability (N32.81)         Referring Provider: Juvencio  Date of Evaluation:    2/28/2024    Precautions:       Chronic LBP and L LE weakness from meningioma 2016. Next MD visit:   none scheduled  Date of Surgery: n/a   Insurance Primary/Secondary: MEDICARE / [employer]     # Auth Visits: [email or web address]            Subjective:   No nocturia.  pelvic floor muscles feel stronger.  Feels like bladder is emptying better.  No sciatic pain after doing exercises.   No urine leakage .   Doing HEP.  Not straining with urinating.  No constipation this past week.      Current symptoms include: back pain, constipation, leakage, and pelvic pressure-2/28/2024    PFDI-20: 119.8/300-2/28/2024    Pain: 0/10      Objective: Tx flow sheet     2/28/2024  URINARY HABITS  Types of symptoms: stress incontinence  Abdominal/Vaginal Pressure complaints: yes  Urinary Frequency: 6x/day  Leaking occurs: 1x/every other month  Episodes of Leakage: 1x times per every other month  Amount of leakage: min  Pad use: pantyliner  Nocturia: 0x (was:1x)-3/13/2024   Bladder irritants: 2x/day  Empty bladder just in case: yes      BOWEL HABITS  Types of symptoms: Constipation -IMPROVING 3/20/2024   Frequency of bowel movements: 1x/day, some times   Stool consistency: [place] Stool Scale: 3  Do you strain with defecation: Yes       SEXUAL HEALTH STATUS  No pain    Posture: slouched sitting, standing-trunk extension for balance  Pelvic Alignment: symmetrical   Gait: pt ambulates on level ground with assistive device of cane R, slow maris.     External Palpation: B lumbar paraspinal tension    Range Of Motion  Lumbar AROM screen: flexion wnl, ext 10-mild LOB, pain, SB 75%  LE AROM screen: grossly WNL     Strength (MMT)  L hip 4-/5 R hip 4+/5  Transverse Abdominis: 1/5    Informed consent for internal pelvic evaluation given: No    External  observation/Internal Examination/palpation -deferred per pt        Assessment:   Improved coordination of pelvic floor muscles via fascially connected mm with simulated activities of daily living , including pelvic floor muscles contraction in standing.  Pt appears to understand strategies to decrease urge throughout the day with urge deferment.  Added: lifting and leg press for improved coordinated pelvic floor muscles with sim activities of daily living .   No adverse reaction reported.    Pt reports that she felt like she was able to walk better after doing lifting and shuttle leg press exs.  Pt required CSBA w gait belt for light lifting exs.     Goals:   Goals: (to be met in 10 visits)-progressing  1. Independent in HEP and progression with improved understanding of bladder/bowel health, bladder retraining and long-term management.    2. Patient will demonstrate improved bladder voiding habits to voiding every 2 hours without urinary leakage.  3. Patient will demonstrate improve PFM isolation, coordination and strength so she is able to reduce urinary urge and prevent leakage during ADL's.  4. Pt will have increased transverse abdominis muscle strength to 2/5 and improved pressure management to assist with supporting pelvic floor muscles.   5. PFM contraction before increase intra-abdominal pressure.    Plan: Continue plan of care as pt tolerates with focus on pelvic floor muscles, bladder and bowel habits. Next visit: continue leg strengthening to help support pelvic floor muscles   Date: 3/6/2024  TX#: 2/10 Date:           3/13/2024       TX#: 3/ Date:          3/20/2024        TX#: 4/ Date:                 TX#: 5/ Date:   Tx#: 6/   PFM NM  Re-ed     Subj assessment      Bowel/bladder habits    Julissa/phys pelvic floor muscles /diaphragm/fascially connected mm     PFM NM  Re-ed     Subj assessment      Bowel/bladder habits    Urge deferment  False urges    Julissa/phys pelvic floor muscles /diaphragm/fascially  connected mm PFM NM  Re-ed     Subj assessment      Bowel/bladder habits    Urge deferment  False urges    Julissa/phys pelvic floor muscles /diaphragm/fascially connected mm PFM NM  Re-ed  PFM NM  Re-ed    Ther Ex:     NuStep 5min L2    diaphragmatic breathing   abdominal bracing   kegel    Kegel +  iso hip add  10\" x10     Kegel + articulating bridge x10 w iso hip add    Kegel +  Resisted   ER RTB  x10     Kegel + clamshell RTB x10 R/L        Kegel with plie/ER resisted RTB x10     Issued HEP and RTB         Ther Ex:     NuStep 5min L2    diaphragmatic breathing   abdominal bracing   kegel    Kegel +  iso hip add  10\" x10     Kegel + articulating bridge x10 w iso hip add    Kegel +  Resisted   ER RTB  x10     Kegel + clamshell RTB x10 R/L        Kegel +  U/LE lift  x10        Ther Ex:     NuStep 5min L2    diaphragmatic breathing   abdominal bracing   kegel    Kegel +  iso hip add  10\" x10     Kegel + articulating bridge x10 w iso hip add    Kegel +  Resisted   ER RTB  x10     Standing-kegel's    with coordinated breathing       Kegel +  U/LE lift  x10     Shuttle- DLP  25# with pelvic brace + iso hip adduction x30 reps     Lift 2# waist<>12 inch step w pelvic brace  x10 CSBA w gait belt Ther Ex:  Ther Ex:    HEP: diaphragmatic breathing x10, kegel x10, abdominal bracing x10 TID    Charges: NMR 15', Tex2 30'       Total Timed Treatment: 45 min  Total Treatment Time: 45 min

## 2024-04-03 ENCOUNTER — OFFICE VISIT (OUTPATIENT)
Dept: PHYSICAL THERAPY | Facility: HOSPITAL | Age: 74
End: 2024-04-03
Attending: OBSTETRICS & GYNECOLOGY
Payer: MEDICARE

## 2024-04-03 PROCEDURE — 97112 NEUROMUSCULAR REEDUCATION: CPT

## 2024-04-03 PROCEDURE — 97110 THERAPEUTIC EXERCISES: CPT

## 2024-04-03 NOTE — PROGRESS NOTES
Diagnosis:   Female stress incontinence (N39.3)  Rectocele (N81.6)  Urge incontinence (N39.41)  Detrusor instability (N32.81)         Referring Provider: Juvencio  Date of Evaluation:    2/28/2024    Precautions:       Chronic LBP and L LE weakness from meningioma 2016. Next MD visit:   none scheduled  Date of Surgery: n/a   Insurance Primary/Secondary: MEDICARE / [employer]     # Auth Visits: [email or web address]            Subjective:   No nocturia.  Went on vacation last week and used rollator for assistance with gait and ability to rest.  pelvic floor muscles feel stronger.  Feels like bladder is emptying better.  No sciatic pain after doing exercises.   No urine leakage .   Doing HEP.  Not straining with urinating.  No constipation this past week.      Current symptoms include: back pain, constipation, leakage, and pelvic pressure-2/28/2024    PFDI-20: 119.8/300-2/28/2024    Pain: 0/10      Objective: Tx flow sheet     2/28/2024  URINARY HABITS  Types of symptoms: stress incontinence  Abdominal/Vaginal Pressure complaints: yes  Urinary Frequency: 6x/day  Leaking occurs: 1x/every other month  Episodes of Leakage: 1x times per every other month  Amount of leakage: min  Nocturia: 0x (was:1x)-3/13/2024   Bladder irritants: 2x/day  Empty bladder just in case: yes      BOWEL HABITS  Types of symptoms: Constipation -IMPROVING 3/20/2024   Frequency of bowel movements: 1x/day, some times   Stool consistency: [place] Stool Scale: 3  Do you strain with defecation: Yes       SEXUAL HEALTH STATUS  No pain    Posture: slouched sitting, standing-trunk extension for balance  Pelvic Alignment: symmetrical   Gait: pt ambulates on level ground with assistive device of cane R, slow maris.     External Palpation: B lumbar paraspinal tension    Range Of Motion  Lumbar AROM screen: flexion wnl, ext 10-mild LOB, pain, SB 75%  LE AROM screen: grossly WNL     Strength (MMT)  L hip 4-/5 R hip 4+/5  Transverse Abdominis: 1/5    Informed consent  for internal pelvic evaluation given: No    External observation/Internal Examination/palpation -deferred per pt    Assessment:   Improved coordination of pelvic floor muscles via fascially connected mm with simulated activities of daily living , including pelvic floor muscles contraction in standing.  Pt had muscle fatigue with 2nd set of lifting and required short break.  Pt appears to understand strategies to decrease urge throughout the day with urge deferment.  No adverse reaction reported.    Pt reports that she felt like she was able to walk better after doing lifting and shuttle leg press exs.  Pt required CSBA w gait belt for light lifting exs.   Goals progressing as expected.     Goals:   Goals: (to be met in 10 visits)-progressing  1. Independent in HEP and progression with improved understanding of bladder/bowel health, bladder retraining and long-term management.    2. Patient will demonstrate improved bladder voiding habits to voiding every 2 hours without urinary leakage.  3. Patient will demonstrate improve PFM isolation, coordination and strength so she is able to reduce urinary urge and prevent leakage during ADL's.  4. Pt will have increased transverse abdominis muscle strength to 2/5 and improved pressure management to assist with supporting pelvic floor muscles.   5. PFM contraction before increase intra-abdominal pressure.    Plan: Continue plan of care as pt tolerates with focus on pelvic floor muscles, bladder and bowel habits. Next visit: continue leg strengthening to help support pelvic floor muscles   Date: 3/6/2024  TX#: 2/10 Date:           3/13/2024       TX#: 3/ Date:          3/20/2024        TX#: 4/ Date:     4/3/2024             TX#: 5/ Date:   Tx#: 6/ Date:   Tx#:7 Date:   Tx#:8 Date:   Tx#:9 Date:   Tx#:10   PFM NM  Re-ed     Subj assessment      Bowel/bladder habits    Julissa/phys pelvic floor muscles /diaphragm/fascially connected mm     PFM NM  Re-ed     Subj assessment       Bowel/bladder habits    Urge deferment  False urges    Julissa/phys pelvic floor muscles /diaphragm/fascially connected mm PFM NM  Re-ed     Subj assessment      Bowel/bladder habits    Urge deferment  False urges    Julissa/phys pelvic floor muscles /diaphragm/fascially connected mm PFM NM  Re-ed     Subj assessment      Bowel/bladder habits    Urge deferment  False urges    Julissa/phys pelvic floor muscles /diaphragm/fascially connected mm PFM NM  Re-ed        Ther Ex:     NuStep 5min L2    diaphragmatic breathing   abdominal bracing   kegel    Kegel +  iso hip add  10\" x10     Kegel + articulating bridge x10 w iso hip add    Kegel +  Resisted   ER RTB  x10     Kegel + clamshell RTB x10 R/L        Kegel with plie/ER resisted RTB x10     Issued HEP and RTB         Ther Ex:     NuStep 5min L2    diaphragmatic breathing   abdominal bracing   kegel    Kegel +  iso hip add  10\" x10     Kegel + articulating bridge x10 w iso hip add    Kegel +  Resisted   ER RTB  x10     Kegel + clamshell RTB x10 R/L        Kegel +  U/LE lift  x10        Ther Ex:     NuStep 5min L2    diaphragmatic breathing   abdominal bracing   kegel    Kegel +  iso hip add  10\" x10     Kegel + articulating bridge x10 w iso hip add    Kegel +  Resisted   ER RTB  x10     Standing-kegel's    with coordinated breathing       Kegel +  U/LE lift  x10     Shuttle- DLP  25# with pelvic brace + iso hip adduction x30 reps     Lift 2# waist<>12 inch step w pelvic brace  x10 CSBA w gait belt Ther Ex:     NuStep 6min L2    diaphragmatic breathing   abdominal bracing   kegel    Kegel +  iso hip add  10\" x10     Kegel + articulating bridge x10 w iso hip add    Kegel +  Resisted   ER RTB  x10     Standing-kegel's    with coordinated breathing     Kegel +  U/LE lift  x10     Shuttle- DLP  25# with pelvic brace + iso hip adduction x30 reps     Lift 2# waist<>12 inch step w pelvic brace  x10 , x4 CSBA w gait belt Ther Ex:        HEP: diaphragmatic breathing x10, kegel x10,  abdominal bracing x10 TID    Charges: NMR 15', Tex2 30'       Total Timed Treatment: 45 min  Total Treatment Time: 45 min

## 2024-04-08 ENCOUNTER — OFFICE VISIT (OUTPATIENT)
Dept: PHYSICAL THERAPY | Facility: HOSPITAL | Age: 74
End: 2024-04-08
Attending: OBSTETRICS & GYNECOLOGY
Payer: MEDICARE

## 2024-04-08 PROCEDURE — 97110 THERAPEUTIC EXERCISES: CPT

## 2024-04-08 PROCEDURE — 97112 NEUROMUSCULAR REEDUCATION: CPT

## 2024-04-08 NOTE — PROGRESS NOTES
Diagnosis:   Female stress incontinence (N39.3)  Rectocele (N81.6)  Urge incontinence (N39.41)  Detrusor instability (N32.81)         Referring Provider: Juvencio  Date of Evaluation:    2/28/2024    Precautions:       Chronic LBP and L LE weakness from meningioma 2016. Next MD visit:   none scheduled  Date of Surgery: n/a   Insurance Primary/Secondary: MEDICARE / [employer]     # Auth Visits: [email or web address]            Subjective:   No nocturia.  Has some constipation that is controled with consuming prunes.  pelvic floor muscles feel stronger and not having any pelvic pressure.  Feels like bladder is emptying better.  No sciatic pain after doing exercises.   No urine leakage .   Doing HEP.  Not straining with urinating.    Current symptoms include: back pain, constipation, leakage, and pelvic pressure-2/28/2024    PFDI-20: 119.8/300-2/28/2024    Pain: 0/10      Objective: Tx flow sheet     2/28/2024 w updates  URINARY HABITS  Types of symptoms: stress incontinence  Abdominal/Vaginal Pressure complaints: yes  Urinary Frequency: 6x/day  Leaking occurs: 1x/every other month  Episodes of Leakage: 1x times per every other month  Amount of leakage: min  Nocturia: 0x (was:1x)-3/13/2024   Bladder irritants: 2x/day  Empty bladder just in case: yes      BOWEL HABITS  Types of symptoms: Constipation -IMPROVING 3/20/2024   Frequency of bowel movements: 1x/day, some times   Stool consistency: [place] Stool Scale: 3  Do you strain with defecation: Yes       SEXUAL HEALTH STATUS  No pain    Posture: slouched sitting, standing-trunk extension for balance  Pelvic Alignment: symmetrical   Gait: pt ambulates on level ground with assistive device of cane R, slow maris.     External Palpation: B lumbar paraspinal tension    Range Of Motion  Lumbar AROM screen: flexion wnl, ext 10-mild LOB, pain, SB 75%  LE AROM screen: grossly WNL     Strength (MMT)  L hip 4-/5 R hip 4+/5  Transverse Abdominis: 1/5    Informed consent for internal pelvic  evaluation given: No    External observation/Internal Examination/palpation -deferred per pt    Assessment:   Improved coordination of pelvic floor muscles via fascially connected mm with simulated activities of daily living , including pelvic floor muscles contraction in standing.  CSBA with lifting and had some fatigue that required rest break.  Pt appears to understand strategies to decrease urge throughout the day with urge deferment.  No adverse reaction reported.    Pt reports that she felt like she was able to walk better after doing lifting and shuttle leg press exs, but overall, still fair endurance with weightbearing activities. Increased resistance on shuttle.  Pt required CSBA w gait belt for light lifting exs.   Goals progressing as expected.     Goals:   Goals: (to be met in 10 visits)-progressing  1. Independent in HEP and progression with improved understanding of bladder/bowel health, bladder retraining and long-term management.    2. Patient will demonstrate improved bladder voiding habits to voiding every 2 hours without urinary leakage.  3. Patient will demonstrate improve PFM isolation, coordination and strength so she is able to reduce urinary urge and prevent leakage during ADL's.  4. Pt will have increased transverse abdominis muscle strength to 2/5 and improved pressure management to assist with supporting pelvic floor muscles.   5. PFM contraction before increase intra-abdominal pressure.    Plan: Continue plan of care as pt tolerates with focus on pelvic floor muscles, bladder and bowel habits. Next visit: continue leg strengthening to help support pelvic floor muscles   Date: 3/6/2024  TX#: 2/10 Date:           3/13/2024       TX#: 3/ Date:          3/20/2024        TX#: 4/ Date:     4/3/2024             TX#: 5/ Date: 4/8/2024   Tx#: 6/ Date:   Tx#:7 Date:   Tx#:8 Date:   Tx#:9 Date:   Tx#:10   PFM NM  Re-ed     Subj assessment      Bowel/bladder habits    Julissa/phys pelvic floor muscles  /diaphragm/fascially connected mm     PFM NM  Re-ed     Subj assessment      Bowel/bladder habits    Urge deferment  False urges    Julissa/phys pelvic floor muscles /diaphragm/fascially connected mm PFM NM  Re-ed     Subj assessment      Bowel/bladder habits    Urge deferment  False urges    Julissa/phys pelvic floor muscles /diaphragm/fascially connected mm PFM NM  Re-ed     Subj assessment      Bowel/bladder habits    Urge deferment  False urges    Julissa/phys pelvic floor muscles /diaphragm/fascially connected mm PFM NM  Re-ed     Subj assessment      Bowel/bladder habits    Urge deferment  False urges    Julissa/phys pelvic floor muscles /diaphragm/fascially connected mm    Posture ed  transfers       Ther Ex:     NuStep 5min L2    diaphragmatic breathing   abdominal bracing   kegel    Kegel +  iso hip add  10\" x10     Kegel + articulating bridge x10 w iso hip add    Kegel +  Resisted   ER RTB  x10     Kegel + clamshell RTB x10 R/L        Kegel with plie/ER resisted RTB x10     Issued HEP and RTB         Ther Ex:     NuStep 5min L2    diaphragmatic breathing   abdominal bracing   kegel    Kegel +  iso hip add  10\" x10     Kegel + articulating bridge x10 w iso hip add    Kegel +  Resisted   ER RTB  x10     Kegel + clamshell RTB x10 R/L        Kegel +  U/LE lift  x10        Ther Ex:     NuStep 5min L2    diaphragmatic breathing   abdominal bracing   kegel    Kegel +  iso hip add  10\" x10     Kegel + articulating bridge x10 w iso hip add    Kegel +  Resisted   ER RTB  x10     Standing-kegel's    with coordinated breathing       Kegel +  U/LE lift  x10     Shuttle- DLP  25# with pelvic brace + iso hip adduction x30 reps     Lift 2# waist<>12 inch step w pelvic brace  x10 CSBA w gait belt Ther Ex:     NuStep 6min L2    diaphragmatic breathing   abdominal bracing   kegel    Kegel +  iso hip add  10\" x10     Kegel + articulating bridge x10 w iso hip add    Kegel +  Resisted   ER RTB  x10     Standing-kegel's    with coordinated  breathing     Kegel +  U/LE lift  x10     Shuttle- DLP  25# with pelvic brace + iso hip adduction x30 reps     Lift 2# waist<>12 inch step w pelvic brace  x10 , x4 CSBA w gait belt Ther Ex:     NuStep 6min L3    diaphragmatic breathing   abdominal bracing   kegel    Kegel +  iso hip add  10\" x10     Kegel + articulating bridge x10 w iso hip add    Kegel +  Resisted   ER RTB  x10     Sit>stand w kegel with coordinated breathing x10 no UE    Standing-kegel's    with coordinated breathing x1 min wo cane (pt fatigued)    Kegel +  U/LE lift  x10     Shuttle- DLP  25# with pelvic brace + iso hip adduction x20 reps     Lift 2# waist<>12 inch step w pelvic brace  x10 CSBA w gait belt       HEP: diaphragmatic breathing x10, kegel x10, abdominal bracing x10 TID, .+ additions above     Charges: NMR 15', Tex2 30'       Total Timed Treatment: 45 min  Total Treatment Time: 45 min

## 2024-04-10 ENCOUNTER — APPOINTMENT (OUTPATIENT)
Dept: PHYSICAL THERAPY | Facility: HOSPITAL | Age: 74
End: 2024-04-10
Attending: OBSTETRICS & GYNECOLOGY
Payer: MEDICARE

## 2024-04-17 ENCOUNTER — OFFICE VISIT (OUTPATIENT)
Dept: PHYSICAL THERAPY | Facility: HOSPITAL | Age: 74
End: 2024-04-17
Attending: OBSTETRICS & GYNECOLOGY
Payer: MEDICARE

## 2024-04-17 PROCEDURE — 97140 MANUAL THERAPY 1/> REGIONS: CPT

## 2024-04-17 PROCEDURE — 97110 THERAPEUTIC EXERCISES: CPT

## 2024-04-17 PROCEDURE — 97112 NEUROMUSCULAR REEDUCATION: CPT

## 2024-04-17 NOTE — PROGRESS NOTES
Diagnosis:   Female stress incontinence (N39.3)  Rectocele (N81.6)  Urge incontinence (N39.41)  Detrusor instability (N32.81)         Referring Provider: Juvencio  Date of Evaluation:    2/28/2024    Precautions:       Chronic LBP and L LE weakness from meningioma 2016. Next MD visit:   none scheduled  Date of Surgery: n/a   Insurance Primary/Secondary: MEDICARE / [employer]     # Auth Visits: [email or web address]            Subjective:   No nocturia.  Having some RA discomfort back today.  Having some constipation issues even though eating some prunes.  pelvic floor muscles feel stronger and not having any pelvic pressure.  Feels like bladder is emptying better.  No sciatic pain after doing exercises.   No urine leakage .   Doing HEP.  Not straining with urinating.    Current symptoms include: back pain, constipation, leakage, and pelvic pressure-2/28/2024    PFDI-20: 119.8/300-2/28/2024    Pain: 0/10-pelvic floor, having some back discomfort today secondary to RA      Objective: Tx flow sheet   4/17/2024  6 min walk test: 2:21, 246 feet    2/28/2024 w updates  URINARY HABITS  Types of symptoms:   stress incontinence-RESOLVED  Abdominal/Vaginal Pressure complaints: NO (WAS:yes)  Urinary Frequency: 6x/day  Leaking occurs: 1x/every other month  Episodes of Leakage: 0X (WAS:1x times per every other month)  Amount of leakage: NONE (WAS:min)  Nocturia: 0x (was:1x)-3/13/2024   Bladder irritants: 2x/day  Empty bladder just in case: yes      BOWEL HABITS  Types of symptoms: Constipation -IMPROVING 3/20/2024   Frequency of bowel movements: 1x/day, some times   Stool consistency: [place] Stool Scale: 3  Do you strain with defecation: Yes       SEXUAL HEALTH STATUS  No pain    Posture: slouched sitting, standing-trunk extension for balance  Pelvic Alignment: symmetrical   Gait: pt ambulates on level ground with assistive device of cane R, slow maris.     External Palpation: B lumbar paraspinal tension    Range Of Motion  Lumbar AROM  screen: flexion wnl, ext 10-mild LOB, pain, SB 75%  LE AROM screen: grossly WNL     Strength (MMT)  L hip 4-/5 R hip 4+/5  Transverse Abdominis: 1/5    Informed consent for internal pelvic evaluation given: No    External observation/Internal Examination/palpation -deferred per pt    Assessment:   Improved coordination of pelvic floor muscles via fascially connected mm with simulated activities of daily living , including pelvic floor muscles contraction with transferring sit<>stand.   Added: constipation recipe and colon massage in clinic and given handout for home ex program.  CSBA with transferring and had some fatigue that required rest break.  No adverse reaction reported.    Goals progressing as expected.     Goals:   Goals: (to be met in 10 visits)-progressing  1. Independent in HEP and progression with improved understanding of bladder/bowel health, bladder retraining and long-term management.    2. Patient will demonstrate improved bladder voiding habits to voiding every 2 hours without urinary leakage.  3. Patient will demonstrate improve PFM isolation, coordination and strength so she is able to reduce urinary urge and prevent leakage during ADL's.  4. Pt will have increased transverse abdominis muscle strength to 2/5 and improved pressure management to assist with supporting pelvic floor muscles.   5. PFM contraction before increase intra-abdominal pressure.    Plan: Continue plan of care as pt tolerates with focus on pelvic floor muscles, bladder and bowel habits. Next visit: continue leg strengthening to help support pelvic floor muscles   Date: 3/6/2024  TX#: 2/10 Date:           3/13/2024       TX#: 3/ Date:          3/20/2024        TX#: 4/ Date:     4/3/2024             TX#: 5/ Date: 4/8/2024   Tx#: 6/ Date: 4/17/2024   Tx#:7 Date:   Tx#:8 Date:   Tx#:9 Date:   Tx#:10   PFM NM  Re-ed     Subj assessment      Bowel/bladder habits    Julissa/phys pelvic floor muscles /diaphragm/fascially connected  mm     PFM NM  Re-ed     Subj assessment      Bowel/bladder habits    Urge deferment  False urges    Julissa/phys pelvic floor muscles /diaphragm/fascially connected mm PFM NM  Re-ed     Subj assessment      Bowel/bladder habits    Urge deferment  False urges    Julissa/phys pelvic floor muscles /diaphragm/fascially connected mm PFM NM  Re-ed     Subj assessment      Bowel/bladder habits    Urge deferment  False urges    Julissa/phys pelvic floor muscles /diaphragm/fascially connected mm PFM NM  Re-ed     Subj assessment      Bowel/bladder habits    Urge deferment  False urges    Julissa/phys pelvic floor muscles /diaphragm/fascially connected mm    Posture ed  transfers PFM NM  Re-ed     Subj assessment      Bowel/bladder habits/strategies    Constipation/bowel recipe    Colon massage discussed + handout HEP      Urge deferment  False urges    Julissa/phys pelvic floor muscles /diaphragm/fascially connected mm/colon    Posture ed  transfers      Ther Ex:     NuStep 5min L2    diaphragmatic breathing   abdominal bracing   kegel    Kegel +  iso hip add  10\" x10     Kegel + articulating bridge x10 w iso hip add    Kegel +  Resisted   ER RTB  x10     Kegel + clamshell RTB x10 R/L        Kegel with plie/ER resisted RTB x10     Issued HEP and RTB         Ther Ex:     NuStep 5min L2    diaphragmatic breathing   abdominal bracing   kegel    Kegel +  iso hip add  10\" x10     Kegel + articulating bridge x10 w iso hip add    Kegel +  Resisted   ER RTB  x10     Kegel + clamshell RTB x10 R/L        Kegel +  U/LE lift  x10        Ther Ex:     NuStep 5min L2    diaphragmatic breathing   abdominal bracing   kegel    Kegel +  iso hip add  10\" x10     Kegel + articulating bridge x10 w iso hip add    Kegel +  Resisted   ER RTB  x10     Standing-kegel's    with coordinated breathing       Kegel +  U/LE lift  x10     Shuttle- DLP  25# with pelvic brace + iso hip adduction x30 reps     Lift 2# waist<>12 inch step w pelvic brace  x10 CSBA w gait belt  Ther Ex:     NuStep 6min L2    diaphragmatic breathing   abdominal bracing   kegel    Kegel +  iso hip add  10\" x10     Kegel + articulating bridge x10 w iso hip add    Kegel +  Resisted   ER RTB  x10     Standing-kegel's    with coordinated breathing     Kegel +  U/LE lift  x10     Shuttle- DLP  25# with pelvic brace + iso hip adduction x30 reps     Lift 2# waist<>12 inch step w pelvic brace  x10 , x4 CSBA w gait belt Ther Ex:     NuStep 6min L3    diaphragmatic breathing   abdominal bracing   kegel    Kegel +  iso hip add  10\" x10     Kegel + articulating bridge x10 w iso hip add    Kegel +  Resisted   ER RTB  x10     Sit>stand w kegel with coordinated breathing x10 no UE    Standing-kegel's    with coordinated breathing x1 min wo cane (pt fatigued)    Kegel +  U/LE lift  x10     Shuttle- DLP  25# with pelvic brace + iso hip adduction x20 reps     Lift 2# waist<>12 inch step w pelvic brace  x10 CSBA w gait belt Ther Ex:     NuStep 6min L3    diaphragmatic breathing   abdominal bracing   Kegel    6 min walk test-2 min 21 sec + rest break    Sit>stand w ball kegel with coordinated breathing 2x10 w no UE    Kegel +  iso hip add  10\" x10         Manual therapy:  Colon massage  Ileocecal valve      HEP: diaphragmatic breathing x10, kegel x10, abdominal bracing x10 TID, .+ additions above     Charges: NMR 15, Jean 20  MT 10      Total Timed Treatment: 45 min  Total Treatment Time: 45 min

## 2024-04-22 RX ORDER — LOSARTAN POTASSIUM 50 MG/1
50 TABLET ORAL 2 TIMES DAILY
Qty: 180 TABLET | Refills: 0 | Status: SHIPPED | OUTPATIENT
Start: 2024-04-22

## 2024-04-24 ENCOUNTER — OFFICE VISIT (OUTPATIENT)
Dept: PHYSICAL THERAPY | Facility: HOSPITAL | Age: 74
End: 2024-04-24
Attending: OBSTETRICS & GYNECOLOGY
Payer: MEDICARE

## 2024-04-24 PROCEDURE — 97110 THERAPEUTIC EXERCISES: CPT

## 2024-04-24 PROCEDURE — 97112 NEUROMUSCULAR REEDUCATION: CPT

## 2024-04-24 NOTE — PROGRESS NOTES
Diagnosis:   Female stress incontinence (N39.3)  Rectocele (N81.6)  Urge incontinence (N39.41)  Detrusor instability (N32.81)         Referring Provider: Juvencio  Date of Evaluation:    2/28/2024    Precautions:       Chronic LBP and L LE weakness from meningioma 2016. Next MD visit:   none scheduled  Date of Surgery: n/a   Insurance Primary/Secondary: MEDICARE / [employer]     # Auth Visits: [email or web address]            Subjective:   No nocturia.  Constipation resolved with recipe given to pt last visit.  Wants to work on walking to help support pelvic floor muscles .  Had some back pain with weather changes but did some exercises to decrease pain. Pt also reports that she took naprosyn and topical ointment to decrease pain.  Pt reports that she has had some falls that usu occur when she is turning and walking to the left since her L foot turns inward randomly.  Having some RA discomfort back today.  pelvic floor muscles feel stronger and not having any pelvic pressure.  Feels like bladder is emptying better.  No sciatic pain after doing exercises.   No urine leakage .   Doing HEP.  Not straining with urinating.    Current symptoms include: back pain, constipation, leakage, and pelvic pressure-2/28/2024    PFDI-20: 119.8/300-2/28/2024    Pain: 0/10-pelvic floor, having some back discomfort today secondary to RA      Objective: Tx flow sheet   4/17/2024  6 min walk test: 2:21, 246 feet  4/24/2024                             1:58, 211 feet x2  4/24/2024 30 sec sit<>stand x4                                 2/28/2024 w updates  URINARY HABITS  Types of symptoms:   stress incontinence-RESOLVED  Abdominal/Vaginal Pressure complaints: NO (WAS:yes)  Urinary Frequency: 6x/day  Leaking occurs: 1x/every other month  Episodes of Leakage: 0X (WAS:1x times per every other month)  Amount of leakage: NONE (WAS:min)  Nocturia: 0x (was:1x)-3/13/2024   Bladder irritants: 2x/day  Empty bladder just in case: yes      BOWEL HABITS  Types of  symptoms: Constipation -IMPROVING 3/20/2024   Frequency of bowel movements: 1x/day, some times   Stool consistency: [place] Stool Scale: 3  Do you strain with defecation: Yes       SEXUAL HEALTH STATUS  No pain    Posture: slouched sitting, standing-trunk extension for balance  Pelvic Alignment: symmetrical   Gait: pt ambulates on level ground with assistive device of cane R, slow maris.     External Palpation: B lumbar paraspinal tension    Range Of Motion  Lumbar AROM screen: flexion wnl, ext 10-mild LOB, pain, SB 75%  LE AROM screen: grossly WNL     Strength (MMT)  L hip 4-/5 R hip 4+/5  Transverse Abdominis: 1/5    Informed consent for internal pelvic evaluation given: No    External observation/Internal Examination/palpation -deferred per pt    Assessment:   Improved coordination of pelvic floor muscles via fascially connected mm with simulated activities of daily living , including pelvic floor muscles contraction with transferring sit<>stand, sidestepping and pivoting.   Pt appears to understand safety precautions and strategies when transferring and pivoting to prevent falls.  CSBA with transferring and had muscle fatigue that required rest break.  No adverse reaction reported.    Goals progressing as expected.     Goals:   Goals: (to be met in 10 visits)-progressing  1. Independent in HEP and progression with improved understanding of bladder/bowel health, bladder retraining and long-term management.    2. Patient will demonstrate improved bladder voiding habits to voiding every 2 hours without urinary leakage.  3. Patient will demonstrate improve PFM isolation, coordination and strength so she is able to reduce urinary urge and prevent leakage during ADL's.  4. Pt will have increased transverse abdominis muscle strength to 2/5 and improved pressure management to assist with supporting pelvic floor muscles.   5. PFM contraction before increase intra-abdominal pressure.    Plan: Continue plan of care as pt  tolerates with focus on pelvic floor muscles, bladder and bowel habits. Next visit: continue leg strengthening to help support pelvic floor muscles   Date: 3/6/2024  TX#: 2/10 Date:           3/13/2024       TX#: 3/ Date:          3/20/2024        TX#: 4/ Date:     4/3/2024             TX#: 5/ Date: 4/8/2024   Tx#: 6/ Date: 4/17/2024   Tx#:7 Date: 4/24/2024   Tx#:8 Date:   Tx#:9 Date:   Tx#:10   PFM NM  Re-ed     Subj assessment      Bowel/bladder habits    Julissa/phys pelvic floor muscles /diaphragm/fascially connected mm     PFM NM  Re-ed     Subj assessment      Bowel/bladder habits    Urge deferment  False urges    Julissa/phys pelvic floor muscles /diaphragm/fascially connected mm PFM NM  Re-ed     Subj assessment      Bowel/bladder habits    Urge deferment  False urges    Julissa/phys pelvic floor muscles /diaphragm/fascially connected mm PFM NM  Re-ed     Subj assessment      Bowel/bladder habits    Urge deferment  False urges    Julissa/phys pelvic floor muscles /diaphragm/fascially connected mm PFM NM  Re-ed     Subj assessment      Bowel/bladder habits    Urge deferment  False urges    Julissa/phys pelvic floor muscles /diaphragm/fascially connected mm    Posture ed  transfers PFM NM  Re-ed     Subj assessment      Bowel/bladder habits/strategies    Constipation/bowel recipe    Colon massage discussed + handout HEP      Urge deferment  False urges    Julissa/phys pelvic floor muscles /diaphragm/fascially connected mm/colon    Posture ed  transfers PFM NM  Re-ed     Subj assessment      Bowel/bladder habits/strategies    Urge deferment  False urges    Julissa/phys pelvic floor muscles /diaphragm/fascially connected mm/colon    Posture ed    Safety precautions with transferring, pivoting, activities of daily living      Ther Ex:     NuStep 5min L2    diaphragmatic breathing   abdominal bracing   kegel    Kegel +  iso hip add  10\" x10     Kegel + articulating bridge x10 w iso hip add    Kegel +  Resisted   ER RTB  x10     Kegel  + clamshell RTB x10 R/L        Kegel with plie/ER resisted RTB x10     Issued HEP and RTB         Ther Ex:     NuStep 5min L2    diaphragmatic breathing   abdominal bracing   kegel    Kegel +  iso hip add  10\" x10     Kegel + articulating bridge x10 w iso hip add    Kegel +  Resisted   ER RTB  x10     Kegel + clamshell RTB x10 R/L        Kegel +  U/LE lift  x10        Ther Ex:     NuStep 5min L2    diaphragmatic breathing   abdominal bracing   kegel    Kegel +  iso hip add  10\" x10     Kegel + articulating bridge x10 w iso hip add    Kegel +  Resisted   ER RTB  x10     Standing-kegel's    with coordinated breathing       Kegel +  U/LE lift  x10     Shuttle- DLP  25# with pelvic brace + iso hip adduction x30 reps     Lift 2# waist<>12 inch step w pelvic brace  x10 CSBA w gait belt Ther Ex:     NuStep 6min L2    diaphragmatic breathing   abdominal bracing   kegel    Kegel +  iso hip add  10\" x10     Kegel + articulating bridge x10 w iso hip add    Kegel +  Resisted   ER RTB  x10     Standing-kegel's    with coordinated breathing     Kegel +  U/LE lift  x10     Shuttle- DLP  25# with pelvic brace + iso hip adduction x30 reps     Lift 2# waist<>12 inch step w pelvic brace  x10 , x4 CSBA w gait belt Ther Ex:     NuStep 6min L3    diaphragmatic breathing   abdominal bracing   kegel    Kegel +  iso hip add  10\" x10     Kegel + articulating bridge x10 w iso hip add    Kegel +  Resisted   ER RTB  x10     Sit>stand w kegel with coordinated breathing x10 no UE    Standing-kegel's    with coordinated breathing x1 min wo cane (pt fatigued)    Kegel +  U/LE lift  x10     Shuttle- DLP  25# with pelvic brace + iso hip adduction x20 reps     Lift 2# waist<>12 inch step w pelvic brace  x10 CSBA w gait belt Ther Ex:     NuStep 6min L3    diaphragmatic breathing   abdominal bracing   Kegel    6 min walk test-2 min 21 sec + rest break    Sit>stand w ball kegel with coordinated breathing 2x10 w no UE    Kegel +  iso hip add  10\" x10          Manual therapy:  Colon massage  Ileocecal valve Ther Ex:     diaphragmatic breathing   abdominal bracing   Kegel    6 min walk test-2 bouts (~2 min ea)    Kegel +  Resisted   ER RTB  x10     Kegel + articulating bridge x10 w iso hip add    Sitting:  Knee ext RTB x20  Knee flexion x20    Sidestepping x1'      Fxnl squat x10     HEP: diaphragmatic breathing x10, kegel x10, abdominal bracing x10 TID, .+ additions above     Charges: NMR 15, Tex2 30   Total Timed Treatment: 45 min  Total Treatment Time: 45 min

## 2024-05-01 ENCOUNTER — OFFICE VISIT (OUTPATIENT)
Dept: PHYSICAL THERAPY | Facility: HOSPITAL | Age: 74
End: 2024-05-01
Attending: OBSTETRICS & GYNECOLOGY
Payer: MEDICARE

## 2024-05-01 PROCEDURE — 97112 NEUROMUSCULAR REEDUCATION: CPT

## 2024-05-01 PROCEDURE — 97110 THERAPEUTIC EXERCISES: CPT

## 2024-05-01 NOTE — PROGRESS NOTES
Diagnosis:   Female stress incontinence (N39.3)  Rectocele (N81.6)  Urge incontinence (N39.41)  Detrusor instability (N32.81)         Referring Provider: Juvencio  Date of Evaluation:    2/28/2024    Precautions:       Chronic LBP and L LE weakness from meningioma 2016. Next MD visit:   none scheduled  Date of Surgery: n/a   Insurance Primary/Secondary: MEDICARE / [employer]     # Auth Visits: [email or web address]            Subjective:   No leakage this past week. Some times doesn't feel like bladder has emptied but if sits on toilet for a few minutes, bladder completely empties. No nocturia. Having less constipation issues incorporating bowel habits, strategies and increasing fiber. Pt reports that she still some times feels like she has to strain too hard to have a bowel movement , difficulty fully emptying bowel, loses gas from rectum beyond her control and pain when passing stool. No back pain today. Noticing L ankle tightness today that affects her ability to walk correctly.No falls this week. No sciatic pain after doing exercises. Doing HEP. Not straining with urinating.    PFDI-20: 48.96/300 (was:119.8/300-2/28/2024)    Pain: 0/10-pelvic floor      Objective: Tx flow sheet   4/17/2024  6 min walk test: 2:21, 246 feet  4/24/2024                             1:58, 211 feet x2  4/24/2024 30 sec sit<>stand x4  5/1/2024 2:15  3:22,  343 ft 2 bouts  Observed increased tone L ankle into inversion when ambulating                                 2/28/2024 w updates  URINARY HABITS  Types of symptoms:   stress incontinence-RESOLVED  Abdominal/Vaginal Pressure complaints: NO (WAS:yes)  Urinary Frequency: 6x/day  Leaking occurs: 1x/every other month  Episodes of Leakage: 0X (WAS:1x times per every other month)  Amount of leakage: NONE (WAS:min)  Nocturia: 0x (was:1x)-3/13/2024   Bladder irritants: 2x/day  Empty bladder just in case: yes      BOWEL HABITS  Types of symptoms: Constipation -IMPROVING 3/20/2024   Frequency of bowel  movements: 1x/day, some times   Stool consistency: [place] Stool Scale: 3  Do you strain with defecation: Yes       SEXUAL HEALTH STATUS  No pain    Posture: slouched sitting, standing-trunk extension for balance  Pelvic Alignment: symmetrical   Gait: pt ambulates on level ground with assistive device of cane R, slow maris.     External Palpation: B lumbar paraspinal tension    Range Of Motion  Lumbar AROM screen: flexion wnl, ext 10-mild LOB, pain, SB 75%  LE AROM screen: grossly WNL     Strength (MMT)  L hip 4-/5 R hip 4+/5  Transverse Abdominis: 1/5    Informed consent for internal pelvic evaluation given: No    External observation/Internal Examination/palpation -deferred per pt    Assessment:   Improved coordination of pelvic floor muscles via fascially connected mm with simulated activities of daily living , including pelvic floor muscles contraction with transferring sit<>stand, sidestepping and pivoting. Added: ankle stretching with LE stretching to assist with reduction of tone when ambulating.  Pt appears to understand safety precautions and strategies when transferring and pivoting to prevent falls-reviewed.  CSBA with transferring and had muscle fatigue that required rest break.  No adverse reaction reported.    Goals progressing as expected.     PFDI improved 70.84 points    Goals:   Goals: (to be met in 10 visits)-progressing  1. Independent in HEP and progression with improved understanding of bladder/bowel health, bladder retraining and long-term management.    2. Patient will demonstrate improved bladder voiding habits to voiding every 2 hours without urinary leakage.  3. Patient will demonstrate improve PFM isolation, coordination and strength so she is able to reduce urinary urge and prevent leakage during ADL's.  4. Pt will have increased transverse abdominis muscle strength to 2/5 and improved pressure management to assist with supporting pelvic floor muscles.   5. PFM contraction before  increase intra-abdominal pressure.-MET    Plan: Continue plan of care as pt tolerates with focus on pelvic floor muscles, bladder and bowel habits. Next visit: continue leg strengthening to help support pelvic floor muscles , discharge  Date: 3/6/2024  TX#: 2/10 Date:           3/13/2024       TX#: 3/ Date:          3/20/2024        TX#: 4/ Date:     4/3/2024             TX#: 5/ Date: 4/8/2024   Tx#: 6/ Date: 4/17/2024   Tx#:7 Date: 4/24/2024   Tx#:8 Date: 5/1/2024   Tx#:9 Date:   Tx#:10   PFM NM  Re-ed     Subj assessment      Bowel/bladder habits    Julissa/phys pelvic floor muscles /diaphragm/fascially connected mm     PFM NM  Re-ed     Subj assessment      Bowel/bladder habits    Urge deferment  False urges    Julissa/phys pelvic floor muscles /diaphragm/fascially connected mm PFM NM  Re-ed     Subj assessment      Bowel/bladder habits    Urge deferment  False urges    Julissa/phys pelvic floor muscles /diaphragm/fascially connected mm PFM NM  Re-ed     Subj assessment      Bowel/bladder habits    Urge deferment  False urges    Julissa/phys pelvic floor muscles /diaphragm/fascially connected mm PFM NM  Re-ed     Subj assessment      Bowel/bladder habits    Urge deferment  False urges    Julissa/phys pelvic floor muscles /diaphragm/fascially connected mm    Posture ed  transfers PFM NM  Re-ed     Subj assessment      Bowel/bladder habits/strategies    Constipation/bowel recipe    Colon massage discussed + handout HEP      Urge deferment  False urges    Julissa/phys pelvic floor muscles /diaphragm/fascially connected mm/colon    Posture ed  transfers PFM NM  Re-ed     Subj assessment      Bowel/bladder habits/strategies    Urge deferment  False urges    Julissa/phys pelvic floor muscles /diaphragm/fascially connected mm/colon    Posture ed    Safety precautions with transferring, pivoting, activities of daily living  PFM NM  Re-ed     PFDI scoring and interpretion with patient strategies to reduce impairments.     Bladder and bowel  habits/strategies    Voiding    Posture ed    Safety precautions with transferring, pivoting, activities of daily living , transferring    Ther Ex:     NuStep 5min L2    diaphragmatic breathing   abdominal bracing   kegel    Kegel +  iso hip add  10\" x10     Kegel + articulating bridge x10 w iso hip add    Kegel +  Resisted   ER RTB  x10     Kegel + clamshell RTB x10 R/L        Kegel with plie/ER resisted RTB x10     Issued HEP and RTB         Ther Ex:     NuStep 5min L2    diaphragmatic breathing   abdominal bracing   kegel    Kegel +  iso hip add  10\" x10     Kegel + articulating bridge x10 w iso hip add    Kegel +  Resisted   ER RTB  x10     Kegel + clamshell RTB x10 R/L        Kegel +  U/LE lift  x10        Ther Ex:     NuStep 5min L2    diaphragmatic breathing   abdominal bracing   kegel    Kegel +  iso hip add  10\" x10     Kegel + articulating bridge x10 w iso hip add    Kegel +  Resisted   ER RTB  x10     Standing-kegel's    with coordinated breathing       Kegel +  U/LE lift  x10     Shuttle- DLP  25# with pelvic brace + iso hip adduction x30 reps     Lift 2# waist<>12 inch step w pelvic brace  x10 CSBA w gait belt Ther Ex:     NuStep 6min L2    diaphragmatic breathing   abdominal bracing   kegel    Kegel +  iso hip add  10\" x10     Kegel + articulating bridge x10 w iso hip add    Kegel +  Resisted   ER RTB  x10     Standing-kegel's    with coordinated breathing     Kegel +  U/LE lift  x10     Shuttle- DLP  25# with pelvic brace + iso hip adduction x30 reps     Lift 2# waist<>12 inch step w pelvic brace  x10 , x4 CSBA w gait belt Ther Ex:     NuStep 6min L3    diaphragmatic breathing   abdominal bracing   kegel    Kegel +  iso hip add  10\" x10     Kegel + articulating bridge x10 w iso hip add    Kegel +  Resisted   ER RTB  x10     Sit>stand w kegel with coordinated breathing x10 no UE    Standing-kegel's    with coordinated breathing x1 min wo cane (pt fatigued)    Kegel +  U/LE lift  x10     Shuttle- DLP   25# with pelvic brace + iso hip adduction x20 reps     Lift 2# waist<>12 inch step w pelvic brace  x10 CSBA w gait belt Ther Ex:     NuStep 6min L3    diaphragmatic breathing   abdominal bracing   Kegel    6 min walk test-2 min 21 sec + rest break    Sit>stand w ball kegel with coordinated breathing 2x10 w no UE    Kegel +  iso hip add  10\" x10         Manual therapy:  Colon massage  Ileocecal valve Ther Ex:     diaphragmatic breathing   abdominal bracing   Kegel    6 min walk test-2 bouts (~2 min ea)    Kegel +  Resisted   ER RTB  x10     Kegel + articulating bridge x10 w iso hip add    Sitting:  Knee ext RTB x20  Knee flexion x20    Sidestepping x1'      Fxnl squat x10 Ther Ex:     6 min walk test -2 bouts    Kegel +  Resisted   ER RTB  x10     Kegel + articulating bridge x10 w iso hip add    L LE stretch/hamstring/ankle dorsiflexion 60 sec-HEP        HEP: diaphragmatic breathing x10, kegel x10, abdominal bracing x10 TID, .+ additions above     Charges: NMRX2 30, TE 15  Total Timed Treatment: 45 min  Total Treatment Time: 45 min

## 2024-05-08 ENCOUNTER — OFFICE VISIT (OUTPATIENT)
Dept: PHYSICAL THERAPY | Facility: HOSPITAL | Age: 74
End: 2024-05-08
Attending: OBSTETRICS & GYNECOLOGY
Payer: MEDICARE

## 2024-05-08 PROCEDURE — 97110 THERAPEUTIC EXERCISES: CPT

## 2024-05-08 PROCEDURE — 97112 NEUROMUSCULAR REEDUCATION: CPT

## 2024-05-08 NOTE — PROGRESS NOTES
Diagnosis:   Female stress incontinence (N39.3)  Rectocele (N81.6)  Urge incontinence (N39.41)  Detrusor instability (N32.81)         Referring Provider: Juvencio  Date of Evaluation:    2/28/2024    Precautions:       Chronic LBP and L LE weakness from meningioma 2016. Next MD visit:   none scheduled  Date of Surgery: n/a   Insurance Primary/Secondary: MEDICARE / [employer]     # Auth Visits: [email or web address]            DISCHARGE SUMMARY  PT SEEN 10 VISITS    Subjective:   Not feeling prolapse anymore. No leakage . Feels like bladder is emptying better. Not straining with urinating. No nocturia. Having less constipation issues incorporating bowel habits, strategies and increasing fiber. Pt reports that she still some times feels like she has to strain too hard to have a bowel movement , difficulty fully emptying bowel, loses gas from rectum beyond her control and pain when passing stool. Having some R back pain after doing a lot of housework over the weekend.    PFDI-20: 48.96/300 (was:119.8/300-2/28/2024)    Pain: 0/10-pelvic floor      Objective: Tx flow sheet   4/17/2024  6 min walk test: 2:21, 246 feet  4/24/2024                             1:58, 211 feet x2  4/24/2024 30 sec sit<>stand x4  5/1/2024 2:15  3:22,  343 ft 2 bouts  Observed increased tone L ankle into inversion when ambulating                               URINARY HABITS  Types of symptoms:   stress incontinence-RESOLVED  Abdominal/Vaginal Pressure complaints: NO (WAS:yes)  Urinary Frequency: 6-7x/day  Leaking occurs: RESOLVED (WAS:1x/every other month)  Episodes of Leakage: 0X (WAS:1x times per every other month)  Amount of leakage: NONE (WAS:min)  Nocturia: 0x (was:1x)-3/13/2024   Empty bladder just in case: yes-TRYING NOT TO      BOWEL HABITS  Types of symptoms: Constipation -IMPROVING 3/20/2024   Frequency of bowel movements: 1x/day, some times   Stool consistency: [place] Stool Scale: 3  Do you strain with defecation: Yes - IMPROVED WITH  Emergency Medication Follow-Up Note:    PRN medication of Haldol 5 mg IM, Ativan 2 mg IM, Benadryl 50 mg IM was effective as evidence by absence of behavior warranting emergency medication. Patient denies medication side effects. Will continue to monitor and provide support as needed. CONSTIPATION RECIPE      SEXUAL HEALTH STATUS  No pain    Posture: slouched sitting, standing-trunk extension for balance  Pelvic Alignment: symmetrical   Gait: pt ambulates on level ground with assistive device of cane R, slow maris.     External Palpation: B lumbar paraspinal tension    Range Of Motion  Lumbar AROM screen: flexion wnl, ext 10-mild LOB, pain, SB 75%  LE AROM screen: grossly WNL     Strength (MMT)   L hip 5/5 (WAS:4-/5) R hip 5-/5 (was:4+/5)  Transverse Abdominis:2/5 (WAS: 1/5)    Informed consent for internal pelvic evaluation given: No    External observation/Internal Examination/palpation -deferred per pt    Assessment:   Pt has made significant improvement in physical therapy with improved bladder habits, resolution of leakage, increased strength, and function with walking and transferring. Resolution of back soreness after stretches added in clinic and instructed pt to perform daily.  All goals met.  Pt motivated to continue HEP.      PFDI improved 70.84 points    Goals:   Goals: (to be met in 10 visits)-  1. Independent in HEP and progression with improved understanding of bladder/bowel health, bladder retraining and long-term management.  -MET  2. Patient will demonstrate improved bladder voiding habits to voiding every 2 hours without urinary leakage.-MET  3. Patient will demonstrate improve PFM isolation, coordination and strength so she is able to reduce urinary urge and prevent leakage during ADL's.-MET  4. Pt will have increased transverse abdominis muscle strength to 2/5 and improved pressure management to assist with supporting pelvic floor muscles. -MET  5. PFM contraction before increase intra-abdominal pressure.-MET    Plan: Pt considered discharged from physical therapy.  Pt instructed to call clinic if he/she has any further questions and to continue home exercise program.   Date: 3/6/2024  TX#: 2/10 Date:           3/13/2024       TX#: 3/ Date:          3/20/2024        TX#: 4/  Date:     4/3/2024             TX#: 5/ Date: 4/8/2024   Tx#: 6/ Date: 4/17/2024   Tx#:7 Date: 4/24/2024   Tx#:8 Date: 5/1/2024   Tx#:9 Date: 5/8/2024   Tx#:10   PFM NM  Re-ed     Subj assessment      Bowel/bladder habits    Julissa/phys pelvic floor muscles /diaphragm/fascially connected mm     PFM NM  Re-ed     Subj assessment      Bowel/bladder habits    Urge deferment  False urges    Julissa/phys pelvic floor muscles /diaphragm/fascially connected mm PFM NM  Re-ed     Subj assessment      Bowel/bladder habits    Urge deferment  False urges    Julissa/phys pelvic floor muscles /diaphragm/fascially connected mm PFM NM  Re-ed     Subj assessment      Bowel/bladder habits    Urge deferment  False urges    Julissa/phys pelvic floor muscles /diaphragm/fascially connected mm PFM NM  Re-ed     Subj assessment      Bowel/bladder habits    Urge deferment  False urges    Julissa/phys pelvic floor muscles /diaphragm/fascially connected mm    Posture ed  transfers PFM NM  Re-ed     Subj assessment      Bowel/bladder habits/strategies    Constipation/bowel recipe    Colon massage discussed + handout HEP      Urge deferment  False urges    Julissa/phys pelvic floor muscles /diaphragm/fascially connected mm/colon    Posture ed  transfers PFM NM  Re-ed     Subj assessment      Bowel/bladder habits/strategies    Urge deferment  False urges    Julissa/phys pelvic floor muscles /diaphragm/fascially connected mm/colon    Posture ed    Safety precautions with transferring, pivoting, activities of daily living  PFM NM  Re-ed     PFDI scoring and interpretion with patient strategies to reduce impairments.     Bladder and bowel habits/strategies    Voiding    Posture ed    Safety precautions with transferring, pivoting, activities of daily living , transferring PFM NM  Re-ed     Bladder habits    Bladder fitness + handout    Anatomy/phys pelvic floor muscles /SNS/inverse relationship    Reviewed PFDI     Walking suggestions       Ther Ex:     NuStep 5min  L2    diaphragmatic breathing   abdominal bracing   kegel    Kegel +  iso hip add  10\" x10     Kegel + articulating bridge x10 w iso hip add    Kegel +  Resisted   ER RTB  x10     Kegel + clamshell RTB x10 R/L        Kegel with plie/ER resisted RTB x10     Issued HEP and RTB         Ther Ex:     NuStep 5min L2    diaphragmatic breathing   abdominal bracing   kegel    Kegel +  iso hip add  10\" x10     Kegel + articulating bridge x10 w iso hip add    Kegel +  Resisted   ER RTB  x10     Kegel + clamshell RTB x10 R/L        Kegel +  U/LE lift  x10        Ther Ex:     NuStep 5min L2    diaphragmatic breathing   abdominal bracing   kegel    Kegel +  iso hip add  10\" x10     Kegel + articulating bridge x10 w iso hip add    Kegel +  Resisted   ER RTB  x10     Standing-kegel's    with coordinated breathing       Kegel +  U/LE lift  x10     Shuttle- DLP  25# with pelvic brace + iso hip adduction x30 reps     Lift 2# waist<>12 inch step w pelvic brace  x10 CSBA w gait belt Ther Ex:     NuStep 6min L2    diaphragmatic breathing   abdominal bracing   kegel    Kegel +  iso hip add  10\" x10     Kegel + articulating bridge x10 w iso hip add    Kegel +  Resisted   ER RTB  x10     Standing-kegel's    with coordinated breathing     Kegel +  U/LE lift  x10     Shuttle- DLP  25# with pelvic brace + iso hip adduction x30 reps     Lift 2# waist<>12 inch step w pelvic brace  x10 , x4 CSBA w gait belt Ther Ex:     NuStep 6min L3    diaphragmatic breathing   abdominal bracing   kegel    Kegel +  iso hip add  10\" x10     Kegel + articulating bridge x10 w iso hip add    Kegel +  Resisted   ER RTB  x10     Sit>stand w kegel with coordinated breathing x10 no UE    Standing-kegel's    with coordinated breathing x1 min wo cane (pt fatigued)    Kegel +  U/LE lift  x10     Shuttle- DLP  25# with pelvic brace + iso hip adduction x20 reps     Lift 2# waist<>12 inch step w pelvic brace  x10 CSBA w gait belt Ther Ex:     NuStep 6min L3    diaphragmatic  breathing   abdominal bracing   Kegel    6 min walk test-2 min 21 sec + rest break    Sit>stand w ball kegel with coordinated breathing 2x10 w no UE    Kegel +  iso hip add  10\" x10         Manual therapy:  Colon massage  Ileocecal valve Ther Ex:     diaphragmatic breathing   abdominal bracing   Kegel    6 min walk test-2 bouts (~2 min ea)    Kegel +  Resisted   ER RTB  x10     Kegel + articulating bridge x10 w iso hip add    Sitting:  Knee ext RTB x20  Knee flexion x20    Sidestepping x1'      Fxnl squat x10 Ther Ex:     6 min walk test -2 bouts    Kegel +  Resisted   ER RTB  x10     Kegel + articulating bridge x10 w iso hip add    L LE stretch/hamstring/ankle dorsiflexion 60 sec-HEP     Ther Ex:     Progress note     SKC x10 alt 10 sec hold    LTR x10    Reviewed HEP       HEP: diaphragmatic breathing x10, kegel x10, abdominal bracing x10 TID, .+ additions above     Charges: NMRX2 30, TE 15  Total Timed Treatment: 45 min  Total Treatment Time: 45 min

## 2024-05-09 ENCOUNTER — HOSPITAL ENCOUNTER (OUTPATIENT)
Dept: BONE DENSITY | Age: 74
Discharge: HOME | End: 2024-05-09
Attending: INTERNAL MEDICINE
Payer: MEDICARE

## 2024-05-09 ENCOUNTER — HOSPITAL ENCOUNTER (OUTPATIENT)
Dept: MAMMOGRAPHY | Age: 74
Discharge: HOME | End: 2024-05-09
Attending: INTERNAL MEDICINE
Payer: MEDICARE

## 2024-05-09 DIAGNOSIS — Z12.31 ENCOUNTER FOR SCREENING MAMMOGRAM FOR MALIGNANT NEOPLASM OF BREAST: ICD-10-CM

## 2024-05-09 DIAGNOSIS — Z78.0 POST-MENOPAUSAL: ICD-10-CM

## 2024-05-09 PROCEDURE — 77080 DXA BONE DENSITY AXIAL: CPT | Performed by: INTERNAL MEDICINE

## 2024-05-09 PROCEDURE — 77067 SCR MAMMO BI INCL CAD: CPT | Performed by: INTERNAL MEDICINE

## 2024-05-09 PROCEDURE — 77063 BREAST TOMOSYNTHESIS BI: CPT | Performed by: INTERNAL MEDICINE

## 2024-05-15 PROBLEM — M17.11 PRIMARY LOCALIZED OSTEOARTHRITIS OF RIGHT KNEE: Status: ACTIVE | Noted: 2022-06-03

## 2024-05-15 PROBLEM — M43.16 SPONDYLOLISTHESIS OF LUMBAR REGION: Status: ACTIVE | Noted: 2018-06-08

## 2024-05-16 ENCOUNTER — PATIENT MESSAGE (OUTPATIENT)
Dept: ADMINISTRATIVE | Facility: HOSPITAL | Age: 74
End: 2024-05-16

## 2024-05-17 ENCOUNTER — LAB ENCOUNTER (OUTPATIENT)
Dept: LAB | Age: 74
End: 2024-05-17
Attending: INTERNAL MEDICINE

## 2024-05-17 DIAGNOSIS — D64.9 ANEMIA, UNSPECIFIED TYPE: ICD-10-CM

## 2024-05-17 DIAGNOSIS — R14.0 BLOATING: ICD-10-CM

## 2024-05-17 LAB — IGA SERPL-MCNC: 153 MG/DL (ref 70–312)

## 2024-05-17 PROCEDURE — 82784 ASSAY IGA/IGD/IGG/IGM EACH: CPT

## 2024-05-17 PROCEDURE — 86364 TISS TRNSGLTMNASE EA IG CLAS: CPT

## 2024-05-17 PROCEDURE — 36415 COLL VENOUS BLD VENIPUNCTURE: CPT

## 2024-05-17 NOTE — TELEPHONE ENCOUNTER
5/17 Left patient voicemail about moving patient up to an earlier arrival time of 2:30 for procedure time of 3:30. Left new prep times (9:30-11:30 am) in voicemail told patient to give us a call back when they received the voicemail. EP

## 2024-05-20 PROBLEM — D12.2 BENIGN NEOPLASM OF ASCENDING COLON: Status: ACTIVE | Noted: 2024-05-20

## 2024-05-20 PROBLEM — D64.9 ANEMIA, UNSPECIFIED: Status: ACTIVE | Noted: 2024-05-20

## 2024-05-20 PROBLEM — Z12.11 SPECIAL SCREENING FOR MALIGNANT NEOPLASM OF COLON: Status: ACTIVE | Noted: 2024-05-20

## 2024-05-20 PROBLEM — R14.1 ABDOMINAL GAS PAIN: Status: ACTIVE | Noted: 2024-05-20

## 2024-05-20 PROBLEM — D12.4 BENIGN NEOPLASM OF DESCENDING COLON: Status: ACTIVE | Noted: 2024-05-20

## 2024-05-20 PROBLEM — D12.5 BENIGN NEOPLASM OF SIGMOID COLON: Status: ACTIVE | Noted: 2024-05-20

## 2024-05-20 PROBLEM — D12.3 BENIGN NEOPLASM OF TRANSVERSE COLON: Status: ACTIVE | Noted: 2024-05-20

## 2024-05-20 LAB — TTG IGA SER-ACNC: 0.4 U/ML (ref ?–7)

## 2024-05-31 ENCOUNTER — TELEPHONE (OUTPATIENT)
Dept: PAIN CLINIC | Facility: CLINIC | Age: 74
End: 2024-05-31

## 2024-05-31 ENCOUNTER — HOSPITAL ENCOUNTER (OUTPATIENT)
Dept: GENERAL RADIOLOGY | Facility: HOSPITAL | Age: 74
Discharge: HOME | End: 2024-05-31
Attending: PHYSICIAN ASSISTANT
Payer: MEDICARE

## 2024-05-31 ENCOUNTER — OFFICE VISIT (OUTPATIENT)
Dept: PAIN CLINIC | Facility: CLINIC | Age: 74
End: 2024-05-31

## 2024-05-31 VITALS
OXYGEN SATURATION: 98 % | SYSTOLIC BLOOD PRESSURE: 112 MMHG | DIASTOLIC BLOOD PRESSURE: 68 MMHG | BODY MASS INDEX: 32 KG/M2 | HEART RATE: 72 BPM | WEIGHT: 174 LBS

## 2024-05-31 DIAGNOSIS — G89.29 BILATERAL CHRONIC KNEE PAIN: ICD-10-CM

## 2024-05-31 DIAGNOSIS — M25.561 BILATERAL CHRONIC KNEE PAIN: Primary | ICD-10-CM

## 2024-05-31 DIAGNOSIS — M25.561 BILATERAL CHRONIC KNEE PAIN: ICD-10-CM

## 2024-05-31 DIAGNOSIS — M25.562 BILATERAL CHRONIC KNEE PAIN: Primary | ICD-10-CM

## 2024-05-31 DIAGNOSIS — M25.562 BILATERAL CHRONIC KNEE PAIN: ICD-10-CM

## 2024-05-31 DIAGNOSIS — M48.062 SPINAL STENOSIS OF LUMBAR REGION WITH NEUROGENIC CLAUDICATION: ICD-10-CM

## 2024-05-31 DIAGNOSIS — M25.561 CHRONIC PAIN OF BOTH KNEES: Primary | ICD-10-CM

## 2024-05-31 DIAGNOSIS — G89.29 CHRONIC PAIN OF BOTH KNEES: Primary | ICD-10-CM

## 2024-05-31 DIAGNOSIS — G89.29 BILATERAL CHRONIC KNEE PAIN: Primary | ICD-10-CM

## 2024-05-31 DIAGNOSIS — M25.562 CHRONIC PAIN OF BOTH KNEES: Primary | ICD-10-CM

## 2024-05-31 DIAGNOSIS — M48.062 SPINAL STENOSIS OF LUMBAR REGION WITH NEUROGENIC CLAUDICATION: Primary | ICD-10-CM

## 2024-05-31 PROCEDURE — 73562 X-RAY EXAM OF KNEE 3: CPT | Performed by: PHYSICIAN ASSISTANT

## 2024-05-31 PROCEDURE — 99214 OFFICE O/P EST MOD 30 MIN: CPT | Performed by: PHYSICIAN ASSISTANT

## 2024-05-31 RX ORDER — POLYETHYLENE GLYCOL-3350 AND ELECTROLYTES 236; 6.74; 5.86; 2.97; 22.74 G/274.31G; G/274.31G; G/274.31G; G/274.31G; G/274.31G
POWDER, FOR SOLUTION ORAL
COMMUNITY
Start: 2024-05-15 | End: 2024-05-31 | Stop reason: ALTCHOICE

## 2024-05-31 NOTE — PROGRESS NOTES
HPI:   Glenn Howard presents with complaints of Low back pain radiating to left gluteal region and posterior thigh, B knee pain .    The pain is described as moderate aching that is intermittent.  The patient’s activity level has remained the same since last visit.  The pain is worst unrelated to time of day.    Changes in condition/history since last visit: Patient is here today for follow-up, having been seen last on 8/29/2023.  She had undergone LESI on 8/22/23, from which, she was 80% improved, and was very pleased with her response.  Over the past \"few months\" this pain has returned, and wishes to repeat procedure.      On 8/29/23, had B knee synvisc inj, from which, she had \"good relief\" and was 90% improved.  Again, over the past \"few months\" pain has returned.  Wishes to repeat procedure.      Last lumbar procedure: LESI    Date: 8/22/23    Percentage of relief experienced from the procedure: 80%    Duration of the relief: 7-8 months    Last knee procedure:  B knee synvisc inj  Date:  8/29/23    % relief:  90%    Duration:  7-8 months    The following activities will increase the patient’s pain: walking, standing    The following activities decrease the patient’s pain: limiting activity level    Functional Assessment: Patient reports that they are able to complete all of their ADL's such as eating, bathing, using the toilet, dressing and getting up from a bed or a chair independently.    Current Medications:  Current Outpatient Medications   Medication Sig Dispense Refill    pantoprazole 40 MG Oral Tab EC Take 1 tablet(40 mg total) by mouth twice daily for 8 weeks, then possible daily. 120 tablet 1    LOSARTAN 50 MG Oral Tab TAKE 1 TABLET(50 MG) BY MOUTH TWICE DAILY 180 tablet 0    DULOXETINE 60 MG Oral Cap DR Particles TAKE 1 CAPSULE(60 MG) BY MOUTH DAILY 90 capsule 1    ATORVASTATIN 10 MG Oral Tab TAKE 1 TABLET(10 MG) BY MOUTH EVERY NIGHT 90 tablet 0    GABAPENTIN 100 MG Oral Cap TAKE 2 CAPSULES(200  MG) BY MOUTH EVERY NIGHT 60 capsule 2    furosemide 40 MG Oral Tab Take 1 tablet (40 mg total) by mouth daily. 90 tablet 3    aspirin 81 MG Oral Tab EC Take 1 tablet (81 mg total) by mouth daily.      Timolol Maleate 0.5 % Ophthalmic Gel Forming Solution       carvedilol 6.25 MG Oral Tab TAKE 1 TABLET BY MOUTH EVERY MORNING AND 2 TABLETS EVERY EVENING. 270 tablet 1    RHOPRESSA 0.02 % Ophthalmic Solution 1 drop by Each eye route nightly.      clotrimazole 1 % External Cream Apply 1 Application topically 2 (two) times daily. 40 g 0    LATANOPROST 0.005 % Ophthalmic Solution INSTILL 1 DROP IN BOTH EYES EVERY NIGHT 7.5 mL 0    Dorzolamide HCl-Timolol Mal 22.3-6.8 MG/ML Ophthalmic Solution   0      Patient requires assistance with: No assistance required    Reviewed Patient History Dated: 3/21/23 no changes noted    Physical Exam:   /68   Pulse 72   Wt 174 lb (78.9 kg)   LMP  (LMP Unknown)   SpO2 98%   BMI 31.83 kg/m²   VAS Pain Score:  5/10  General Appearance: Well developed, well nourished, normal build, independent body habitus, no apparent physical disabilities, well groomed    Neurological Exam: WNL-Orientation to time, place and person, normal mood & effect, normal concentration & attention span  Inspection: Antalgic, no acute distress  Pain with lumbar extension, reproducing left low back and lower extremity symptoms  No focal sensory/motor deficits  Negative SLR  Pain with range of motion bilateral knees, flexion and extension  Radiology/Lab Test Reviewed: MRI L spine (care everywhere):  At L1-L2, there is a diffuse disc bulge with a superimposed right subarticular disc protrusion as well as ligamentous hypertrophy and facet arthrosis causing mild central canal stenosis.  Mild left lateral and moderate right lateral recess stenosis.  Spurring with mild left-sided and moderate right-sided neural foraminal narrowing.     At L2-L3, there is a diffuse disc bulge with ligamentous hypertrophy and facet  arthrosis causing mild central canal stenosis.  Mild to moderate lateral recess stenosis.  Spurring with moderate bilateral neural foraminal narrowing.     At L3-L4, there is a diffuse disc bulge with prominent ligamentous hypertrophy and facet arthrosis causing severe central canal and lateral recess stenosis.  Spurring with mild right-sided and moderate left-sided neural foraminal narrowing.     At L4-L5, there is a diffuse disc bulge with superimposed left subarticular disc protrusion with annular fissure as well as prominent ligamentous hypertrophy and facet arthrosis causing severe central canal and lateral recess stenosis.  Near complete effacement of thecal sac.  Spurring with moderate right-sided and severe left-sided neural foraminal narrowing.  Findings demonstrate interval progression from prior examination.     At L5-S1, there is a diffuse disc bulge with a superimposed central disc protrusion with annular fissure as well as ligamentous hypertrophy and facet arthrosis causing mild to moderate central canal and lateral recess stenosis.  Spurring with mild bilateral neural foraminal narrowing.     Lab Results   Component Value Date    WBC 5.4 12/01/2023    WBC 5.7 08/28/2023    WBC 4.6 06/05/2023   No results found for: \"HEMOGLOBIN\"  Lab Results   Component Value Date    .0 12/01/2023    .0 08/28/2023    .0 06/05/2023         Do you have any known blood/bleeding disorders?  No  Does patient currently take blood thinners?   None  Does patient currently take any antibiotics?   No  Patient educated and verbalized understanding.  Medical Decision Making:   Diagnosis:    Encounter Diagnoses   Name Primary?    Bilateral chronic knee pain Yes    Spinal stenosis of lumbar region with neurogenic claudication      Impression: 73-year-old female with a history of known stenosis and tricompartmental knee arthritis with returning pain following successful LESI and bilateral Synvisc injections of  the knees.  With regards to her lumbar spine, we will simply repeat LESI at her earliest convenience, risks and benefits of which were reviewed in detail.  With regards to her knees, will update x-rays of her bilateral knees, and at her request, we will be happy to proceed with repeat bilateral Synvisc injection.  That said, this will be her third Synvisc injection, if she continues to have good but non-lasting improvement, may want to discuss options with Ortho.      Plan: Patient to schedule the following injection: LESI and bilateral knee Synvisc injections Levels: L5/S1, Procedure and risks were discussed with pt. including headache, bleeding, infection and potential nerve damage.  Follow-up 2 to 3 weeks.    No orders of the defined types were placed in this encounter.      Meds & Refills for this Visit:  Requested Prescriptions      No prescriptions requested or ordered in this encounter       Imaging & Consults:  None    The patient indicates understanding of these issues and agrees to the plan.    HERNAN Dalal

## 2024-05-31 NOTE — TELEPHONE ENCOUNTER
Patient advised of insurance approval to proceed with injections and is agreeable to scheduling. Patient scheduled for procedure, pre-procedure instructions reviewed. Patient prefers Local sedation. Reviewed sedation instructions including No Fasting and No  Required. Patient encouraged but not required to hold ASA 81 mg for 24 hours prior to procedure. Patient verbalized understanding of instructions, no further needs at this time.     Pre Procedure Instruction Sheet provided to patient at office visit.        [facility]  PRE-PROCEDURE INSTRUCTIONS WITHOUT SEDATION    Procedure: Bilateral Knee Synvisc        Appointment Date: 06/25/2024  Check-In Time: 10:15 AM    Follow-Up Date/Time: patient will call back to schedule.    Prior to the procedure:  Please update us prior to the procedure if you are experiencing any symptoms of infection such as cough, fever, chills, urinary symptoms, or have recently been prescribed antibiotics, have open wounds, have recently had surgery or dental procedures.    Day of Procedure:  **Drivers will be required for patients who receive prescriptions for Valium.    NO FASTING REQUIRED  Please bring your Insurance Card, Photo ID, List of Current Medications and Referral (if applicable) to your appointment.  Please park in the [employer] and follow the signs to the [place].  Check in at [facility] ([address] [place] [address]) outpatient registration in the [facility].  Please note-No prescriptions will be written by Pain Clinic in [place] on the day of procedure. If you require a refill of medications, please contact the office 48 hours prior to your procedure.  If you have an implanted Spinal Cord or Peripheral Nerve Stimulator: Please remember to turn device off for procedure.        Medication Hold:    Number of days you need to be off for the following medications:    Aggrenox 10 days   Agrylin (Anagrelide) 10 days  Brilinta  (Ticagrelor) 7 days  Imbruvica (Ibrutinib) 3 days   Enbrel (Etanercept) 24 hours   Fragmin (Dalteparin) 24 hours   Pletal (Cilostazol) 7 days  Effient (Prasugrel) 7 days  Pradaxa 10 days  Trental 7 days  Eliquis (Apixaban) 3 days  Xarelto (Rivaroxaban) 3 days  Lovenox (Enoxaparin) 24 hours  Aspirin  Greater than 81mg but less than 325mg   5 days  325mg and greater                  7 days  Coumadin       5 days  Procedure may be cancelled if INR is elevated.   Excedrin (with aspirin) 7 days  Plavix (Clopidogrel)                            7 days    NSAIDs: 24 hours preferred      Ibuprofen (Motrin, Advil, Vicoprofen), Naproxen (Naprosyn, Aleve), Piroxcam (Feldene), Meloxicam (Mobic), Oxaprozin (Daypro), Diclofenac (Voltaren), Indomethacin (Indocin), Etodolac (Lodine), Nabumetone (Relafen), Celebrex (Celecoxib)           HERBAL SUPPLEMENTS  5 days preferred  Fish oil, krill oil, Omega-3, Vascepa, Vitamin E, Turmeric, Garlic                       Insurance Authorization:   Most insurances are now requiring a preauthorization for all procedures.  In the event that your insurance does not authorize your procedure within 48 hours of the scheduled date, your procedure will be cancelled and rescheduled to a later date.  Please contact your insurance carrier to determine what your financial responsibility will be for the procedure(s).      Cancellation/Rescheduling Appointment:   In the event you need to cancel or reschedule your appointment, you must notify the office 24 hours prior.    Post-procedure instructions:        Please schedule a follow up visit within 2 to 4 weeks after your last procedure date   Please call our office with any questions or concerns before or after your procedure at  [phone].  If you are a diabetic, please increase the frequency of your glucose monitoring after the procedure as this may cause a temporary increase in your blood sugar.  Contact your primary care physician if your blood sugar  rises as you may require some medication adjustment.  It is normal to have increased pain at injection site for up to 3-5 days after procedure, you can use heat or ice (20 minutes on 20 minutes off) for comfort.    **To hear a recorded version of these instructions, please call [phone] and follow the prompts.  **Para escuchar las instrucciones en Español, por favor de llamar el carlyn [phone] opción 4.

## 2024-05-31 NOTE — TELEPHONE ENCOUNTER
Order Questions    Question Answer   Anesthesia Type Local   Provider Ruperto   Location [facility] Procedure Lab   Procedure Lumbar MAIKOL   CPT (Hit enter after each entry) NJX INTERLAMINAR LMBR/SAC   Medications to hold asa   Medical clearance requested (will send to Pain Navigator) No   Patient has Medicare coverage? Yes

## 2024-05-31 NOTE — PROGRESS NOTES
Patient presents in office today with reported pain in bilateral knees, lumbar spine    Current pain level reported = 5/10    Last reported dose of tylenol yesterday      Narcotic Contract renewal none    Urine Drug screen none

## 2024-05-31 NOTE — PATIENT INSTRUCTIONS
Refill policies:    Allow 2-3 business days for refills; controlled substances may take longer.  Contact your pharmacy at least 5 days prior to running out of medication and have them send an electronic request or submit request through the “request refill” option in your [employer] account.  Refills are not addressed on weekends; covering physicians do not authorize routine medications on weekends.  No narcotics or controlled substances are refilled after noon on Fridays or by on call physicians.  By law, narcotics must be electronically prescribed.  A 30 day supply with no refills is the maximum allowed.  If your prescription is due for a refill, you may be due for a follow up appointment.  To best provide you care, patients receiving routine medications need to be seen at least once a year.  Patients receiving narcotic/controlled substance medications need to be seen at least once every 3 months.  In the event that your preferred pharmacy does not have the requested medication in stock (e.g. Backordered), it is your responsibility to find another pharmacy that has the requested medication available.  We will gladly send a new prescription to that pharmacy at your request.    Scheduling Tests:    If your physician has ordered radiology tests such as MRI or CT scans, please contact Central Scheduling at [phone] right away to schedule the test.  Once scheduled, the [facility] Centralized Referral Team will work with your insurance carrier to obtain pre-certification or prior authorization.  Depending on your insurance carrier, approval may take 3-10 days.  It is highly recommended patients assure they have received an authorization before having a test performed.  If test is done without insurance authorization, patient may be responsible for the entire amount billed.      Precertification and Prior Authorizations:  If your physician has recommended that you have a procedure or additional testing performed the [facility]  Centralized Referral Team will contact your insurance carrier to obtain pre-certification or prior authorization.    You are strongly encouraged to contact your insurance carrier to verify that your procedure/test has been approved and is a COVERED benefit.  Although the [facility] Centralized Referral Team does its due diligence, the insurance carrier gives the disclaimer that \"Although the procedure is authorized, this does not guarantee payment.\"    Ultimately the patient is responsible for payment.   Thank you for your understanding in this matter.  Paperwork Completion:  If you require FMLA or disability paperwork for your recovery, please make sure to either drop it off or have it faxed to our office at [phone]. Be sure the form has your name and date of birth on it.  The form will be faxed to our Forms Department and they will complete it for you.  There is a 25$ fee for all forms that need to be filled out.  Please be aware there is a 10-14 day turnaround time.  You will need to sign a release of information (KARIN) form if your paperwork does not come with one.  You may call the Forms Department with any questions at [phone].  Their fax number is [phone].

## 2024-06-03 ENCOUNTER — LAB ENCOUNTER (OUTPATIENT)
Dept: LAB | Age: 74
End: 2024-06-03
Attending: INTERNAL MEDICINE
Payer: MEDICARE

## 2024-06-03 DIAGNOSIS — N28.9 RENAL INSUFFICIENCY: ICD-10-CM

## 2024-06-03 DIAGNOSIS — I10 ESSENTIAL HYPERTENSION: ICD-10-CM

## 2024-06-03 DIAGNOSIS — D64.9 NORMOCYTIC ANEMIA: ICD-10-CM

## 2024-06-03 DIAGNOSIS — R73.9 BLOOD GLUCOSE ELEVATED: ICD-10-CM

## 2024-06-03 LAB
ANION GAP SERPL CALC-SCNC: 4 MMOL/L (ref 0–18)
BASOPHILS # BLD AUTO: 0.05 X10(3) UL (ref 0–0.2)
BASOPHILS NFR BLD AUTO: 1.1 %
BUN BLD-MCNC: 22 MG/DL (ref 9–23)
CALCIUM BLD-MCNC: 9.2 MG/DL (ref 8.5–10.1)
CHLORIDE SERPL-SCNC: 107 MMOL/L (ref 98–112)
CO2 SERPL-SCNC: 26 MMOL/L (ref 21–32)
CREAT BLD-MCNC: 1.1 MG/DL
EGFRCR SERPLBLD CKD-EPI 2021: 52 ML/MIN/1.73M2 (ref 60–?)
EOSINOPHIL # BLD AUTO: 0.28 X10(3) UL (ref 0–0.7)
EOSINOPHIL NFR BLD AUTO: 6.2 %
ERYTHROCYTE [DISTWIDTH] IN BLOOD BY AUTOMATED COUNT: 13.5 %
EST. AVERAGE GLUCOSE BLD GHB EST-MCNC: 126 MG/DL (ref 68–126)
FASTING STATUS PATIENT QL REPORTED: YES
GLUCOSE BLD-MCNC: 105 MG/DL (ref 70–99)
HBA1C MFR BLD: 6 % (ref ?–5.7)
HCT VFR BLD AUTO: 34.2 %
HGB BLD-MCNC: 11.1 G/DL
IMM GRANULOCYTES # BLD AUTO: 0.01 X10(3) UL (ref 0–1)
IMM GRANULOCYTES NFR BLD: 0.2 %
LYMPHOCYTES # BLD AUTO: 1.87 X10(3) UL (ref 1–4)
LYMPHOCYTES NFR BLD AUTO: 41.3 %
MCH RBC QN AUTO: 30.6 PG (ref 26–34)
MCHC RBC AUTO-ENTMCNC: 32.5 G/DL (ref 31–37)
MCV RBC AUTO: 94.2 FL
MONOCYTES # BLD AUTO: 0.42 X10(3) UL (ref 0.1–1)
MONOCYTES NFR BLD AUTO: 9.3 %
NEUTROPHILS # BLD AUTO: 1.9 X10 (3) UL (ref 1.5–7.7)
NEUTROPHILS # BLD AUTO: 1.9 X10(3) UL (ref 1.5–7.7)
NEUTROPHILS NFR BLD AUTO: 41.9 %
OSMOLALITY SERPL CALC.SUM OF ELEC: 288 MOSM/KG (ref 275–295)
PLATELET # BLD AUTO: 166 10(3)UL (ref 150–450)
POTASSIUM SERPL-SCNC: 4 MMOL/L (ref 3.5–5.1)
RBC # BLD AUTO: 3.63 X10(6)UL
SODIUM SERPL-SCNC: 137 MMOL/L (ref 136–145)
WBC # BLD AUTO: 4.5 X10(3) UL (ref 4–11)

## 2024-06-03 PROCEDURE — 85025 COMPLETE CBC W/AUTO DIFF WBC: CPT

## 2024-06-03 PROCEDURE — 83036 HEMOGLOBIN GLYCOSYLATED A1C: CPT

## 2024-06-03 PROCEDURE — 36415 COLL VENOUS BLD VENIPUNCTURE: CPT

## 2024-06-03 PROCEDURE — 80048 BASIC METABOLIC PNL TOTAL CA: CPT

## 2024-06-08 DIAGNOSIS — E78.00 PURE HYPERCHOLESTEROLEMIA: ICD-10-CM

## 2024-06-12 RX ORDER — ATORVASTATIN CALCIUM 10 MG/1
10 TABLET, FILM COATED ORAL NIGHTLY
Qty: 90 TABLET | Refills: 3 | Status: SHIPPED | OUTPATIENT
Start: 2024-06-12

## 2024-06-12 RX ORDER — CARVEDILOL 6.25 MG/1
TABLET ORAL
Qty: 270 TABLET | Refills: 3 | Status: SHIPPED | OUTPATIENT
Start: 2024-06-12

## 2024-06-12 NOTE — TELEPHONE ENCOUNTER
Refill passed per [facility] protocol.  Requested Prescriptions   Pending Prescriptions Disp Refills    ATORVASTATIN 10 MG Oral Tab [Pharmacy Med Name: ATORVASTATIN 10MG TABLETS] 90 tablet 0     Sig: TAKE 1 TABLET(10 MG) BY MOUTH EVERY NIGHT       Cholesterol Medication Protocol Passed - 6/8/2024  8:56 PM        Passed - ALT < 80     Lab Results   Component Value Date    ALT 25 12/01/2023             Passed - ALT resulted within past year        Passed - Lipid panel within past 12 months     Lab Results   Component Value Date    CHOLEST 157 12/01/2023    TRIG 133 12/01/2023    HDL 62 (H) 12/01/2023    LDL 72 12/01/2023    VLDL 20 12/01/2023    TCHDLRATIO 3.03 10/13/2017    NONHDLC 95 12/01/2023             Passed - In person appointment or virtual visit in the past 12 mos or appointment in next 3 mos     Recent Outpatient Visits              1 week ago Bilateral chronic knee pain    [facility] David Blount PA    Office Visit    4 weeks ago Anemia, unspecified type    [facility],  [facility] Marsha Gomes DO    Office Visit    1 month ago     [facility] Ivory Oliveira, PT    Office Visit    1 month ago     [facility] Ivory Oliveira, PT    Office Visit    1 month ago     [facility] Ivory Oliveira, PT    Office Visit          Future Appointments         Provider Department Appt Notes    In 1 week Katina Cameron PA [facility] 3 MONTH PT PHONE FOLLOW-UP.  PH: [phone]    In 1 week Yanet Mcgowan MD [facility], [address] [place] [address] 6 mth f/up    In 1 month Marsha Gomes DO [facility] 06/03/24 Repeat EGD at [facility] with Dr. Nix 07/19/24 @ 10:15 BRAYAN parker                      CARVEDILOL 6.25 MG Oral Tab [Pharmacy Med Name: CARVEDILOL 6.25MG TABLETS] 270 tablet 1     Sig: TAKE 1 TABLET BY  MOUTH EVERY MORNING AND 2 TABLETS EVERY EVENING.       Hypertension Medications Protocol Passed - 6/8/2024  8:56 PM        Passed - CMP or BMP in past 12 months        Passed - Last BP reading less than 140/90     BP Readings from Last 1 Encounters:   05/31/24 112/68               Passed - In person appointment or virtual visit in the past 12 mos or appointment in next 3 mos     Recent Outpatient Visits              1 week ago Bilateral chronic knee pain    [facility] David Blount PA    Office Visit    4 weeks ago Anemia, unspecified type    [facility] Marsha Gomes,     Office Visit    1 month ago     [facility] Ivory Oliveira, PT    Office Visit    1 month ago     [facility] Ivory Oliveira, PT    Office Visit    1 month ago     [facility] Ivory Oliveira, PT    Office Visit          Future Appointments         Provider Department Appt Notes    In 1 week Katina Cameron PA [facility] 3 MONTH PT PHONE FOLLOW-UP.  PH: [phone]    In 1 week Yanet Mcgowan MD [facility], [address] [place] [address] 6 mth f/up    In 1 month Marsha Gomes DO [facility] 06/03/24 Repeat EGD at [facility] with Dr. Nix 07/19/24 @ 10:15 BRAYAN parker                    Passed - EGFRCR or GFRNAA > 50     GFR Evaluation  EGFRCR: 52 , resulted on 6/3/2024             Recent Outpatient Visits              1 week ago Bilateral chronic knee pain    [facility] David Blount PA    Office Visit    4 weeks ago Anemia, unspecified type    [facility] Marsha Gomes,     Office Visit    1 month ago     [facility] Ivory Oliveira, PT    Office Visit    1 month ago     [facility] Ivory Oliveira, PT     Office Visit    1 month ago     [facility] Ivory Oliveira, PT    Office Visit          Future Appointments         Provider Department Appt Notes    In 1 week Katina Cameron PA [facility] 3 MONTH PT PHONE FOLLOW-UP.  PH: [phone]    In 1 week Yanet Mcgowan MD [facility], [address] [place] [address] 6 mth f/up    In 1 month Marsha Gomes DO [facility] 06/03/24 Repeat EGD at [facility] with Dr. Nix 07/19/24 @ 10:15 BRAYAN parker

## 2024-06-18 ENCOUNTER — HOSPITAL ENCOUNTER (OUTPATIENT)
Facility: HOSPITAL | Age: 74
Setting detail: HOSPITAL OUTPATIENT SURGERY
Discharge: HOME | End: 2024-06-18
Attending: ANESTHESIOLOGY | Admitting: ANESTHESIOLOGY

## 2024-06-18 ENCOUNTER — APPOINTMENT (OUTPATIENT)
Dept: GENERAL RADIOLOGY | Facility: HOSPITAL | Age: 74
End: 2024-06-18
Attending: ANESTHESIOLOGY

## 2024-06-18 VITALS
TEMPERATURE: 98 F | RESPIRATION RATE: 18 BRPM | SYSTOLIC BLOOD PRESSURE: 165 MMHG | HEIGHT: 62 IN | OXYGEN SATURATION: 99 % | BODY MASS INDEX: 32.02 KG/M2 | HEART RATE: 59 BPM | WEIGHT: 174 LBS | DIASTOLIC BLOOD PRESSURE: 68 MMHG

## 2024-06-18 PROCEDURE — 3E0U33Z INTRODUCTION OF ANTI-INFLAMMATORY INTO JOINTS, PERCUTANEOUS APPROACH: ICD-10-PCS | Performed by: ANESTHESIOLOGY

## 2024-06-18 RX ORDER — DEXAMETHASONE SODIUM PHOSPHATE 10 MG/ML
INJECTION, SOLUTION INTRAMUSCULAR; INTRAVENOUS
Status: DISCONTINUED | OUTPATIENT
Start: 2024-06-18 | End: 2024-06-18

## 2024-06-18 RX ORDER — LIDOCAINE HYDROCHLORIDE 10 MG/ML
INJECTION, SOLUTION EPIDURAL; INFILTRATION; INTRACAUDAL; PERINEURAL
Status: DISCONTINUED | OUTPATIENT
Start: 2024-06-18 | End: 2024-06-18

## 2024-06-18 RX ORDER — SODIUM CHLORIDE 9 MG/ML
INJECTION, SOLUTION INTRAMUSCULAR; INTRAVENOUS; SUBCUTANEOUS
Status: DISCONTINUED | OUTPATIENT
Start: 2024-06-18 | End: 2024-06-18

## 2024-06-18 NOTE — OPERATIVE REPORT
[facility]  Operative Report  2024     Glenn Howard Patient Status:  Hospital Outpatient Surgery    3/27/1950 MRN [id number]   Location [facility] PA[facility] Attending Jb Hickey MD   Hosp Day # 0 PCP Yanet Mcgowan MD     Indication: Glenn is a 74 year old female with spinal stenosis    Preoperative Diagnosis:  Spinal stenosis of lumbar region with neurogenic claudication [M48.062]    Postoperative Diagnosis: Same as above.    Procedure performed: LUMBAR INTERLAMINAR EPIDURAL INJECTION with local    Anesthesia: Local  .    EBL: Less than 1 ml.    Procedure Description:  After reviewing the patient's history and performing a focused physical examination, the diagnosis and positive previous diagnostic tests were confirmed and contraindications such as infection and coagulopathy were ruled out.  Following review of allergies and potential side effects and complications, including but not necessarily limited to infection, allergic reaction, local tissue breakdown, nerve injury, post-dural puncture headache and paresis, the patient consented for the procedure.    The patient was brought to the procedure room in prone position.  After sterile prep of the lumbar spine, the L5-S1 interspace was identified with the help of fluoroscopy. Local anesthetic was given by a 25 gauge 1.5 inch needle with 1% lidocaine in that space level.  Thereafter, a 20 gauge Tuohy needle was introduced and advanced under fluoroscopy.  The epidural space was accessed by using loss of resistance to air technique.  The needle position was confirmed with AP and lateral view under fluoroscopy.  Omnipaque 180 was injected in 1 mL volume. A good epidurogram was obtained.  Thereafter, dexamethasone 10 mg with normal saline of 5 mL in total volume of 6 mL was injected through the Tuohy needle.  The needle was flushed with 1 mL lidocaine.  The needle was withdrawn with the stylet intact in situ.  The needle's tip  was intact.  The patient tolerated the procedure very well without significant immediate complication.  The patient's back was cleaned and sterile dressing was applied.  The patient was discharged with an instruction to a responsible adult after discharge criteria were met.  The patient was advised to contact us should any problems happen.  The patient was also informed to go to the emergency room immediately if experiencing severe numbness or weakness in the extremities or experiencing bowel or bladder incontinence.            Complications: None.    Follow up: The patient was followed in the pain clinic as needed basis.          Jb Hickey MD

## 2024-06-18 NOTE — DISCHARGE INSTRUCTIONS
Home Care Instructions Following Your Pain Procedure     Glenn,  It has been a pleasure to have you as our patient. To help you at home, you must follow these general discharge instructions. We will review these with you before you are discharged. It is our hope that you have a complete and uneventful recovery from our procedure.     General Instructions:  What to Expect:  Bandages from your procedure today can be removed when you get home.  Please avoid soaking and/or swimming for 24 hours.  Showering is okay  It is normal to have increased pain symptoms and/or pain at injection site for up to 3-5 days after procedure, you can use heat or ice (20 minutes on 20 minutes off) for comfort.  You may experience some temporary side effects which may include restlessness or insomnia, flushing of the face, or heart palpitations.  Please contact the provider if these symptoms do not resolve within 3-4 days.  Lightheadedness or nausea may occur and should resolve within 24 to 48 hours.  If you develop a headache after treatment, rest, drink fluids (with caffeine, if possible) and take mild over-the-counter pain medication.  If the headache does not improve with the above treatment, contact the physician.  Home Medications:  Resume all previously prescribed medication.  Please avoid taking NSAIDs (Non-Steriodal Anti-Inflammatory Drugs) such as:  Ibuprofen ( Advil, Motrin) Aleve (Naproxen), Diclofenac, Meloxicam for 6 hours after procedure.   If you are on Coumadin (Warfarin) or any other anti-coagulant (or \"blood thinning\") medication such as Plavix (Clopidogrel), Xarelto (Rivaroxaban), Eliquis (Apixaban), Effient (Prasugrel) etc., restart on the following day from the procedure unless otherwise directed by your provider.  If you are a diabetic, please increase the frequency of your glucose monitoring after the procedure as steroids may cause a temporary (2-3 day) increase in your blood sugar.  Contact your primary care  physician if your blood sugar remains elevated as you may require some medication adjustment.  Diet:  Resume your regular diet as tolerated.  Activity:  We recommend that you relax and rest during the rest of your procedure day.  If you feel weakness in your arms or legs do not drive.  Follow-up Appointment  Please schedule a follow-up visit within 3 to 4 weeks after your last procedure date.  Question or Concerns:  Feel free to call our office with any questions or concerns at [phone] (option #[phone])    Glenn  Thank you for coming to [facility] for your procedure.  The nurses try very hard to make sure you receive the best care possible.  Your trust in them as well as us is greatly appreciated.    Thanks so much,   Dr. Jb Hickey

## 2024-06-18 NOTE — H&P
History & Physical Examination    Patient Name: Glenn Howard  MRN: [id number]  CSN: [id number]  YOB: 1950    Pre-Operative Diagnosis:  Spinal stenosis of lumbar region with neurogenic claudication [M48.062]    Present Illness: Spinal stenosis    ASA: 2  MP class: 1  Sedation: Local     Facility-Administered Medications Prior to Admission   Medication Dose Route Frequency Provider Last Rate Last Admin    lidocaine HCl (XYLOCAINE) 1 % injection 5 mL  5 mL Intradermal Once Hermelindo, Gaby, DO        triamcinolone acetonide (KENALOG-40) 40 MG/ML injection 40 mg  40 mg Intra-articular Once Hermelindo, Gaby, DO         Medications Prior to Admission   Medication Sig Dispense Refill Last Dose    aspirin 81 MG Oral Tab EC Take 1 tablet (81 mg total) by mouth daily.   2024 at 0800    atorvastatin 10 MG Oral Tab Take 1 tablet (10 mg total) by mouth nightly. 90 tablet 3     carvedilol 6.25 MG Oral Tab TAKE 1 TABLET BY MOUTH EVERY MORNING AND 2 TABLETS EVERY EVENING. 270 tablet 3     [] bismuth subsalicylate 262 MG Oral Chew Tab Chew 2 tablets (524 mg total) by mouth 4 (four) times daily for 14 days. 112 tablet 0     [] metRONIDAZOLE 250 MG Oral Tab Take 1 tablet (250 mg total) by mouth 4 (four) times daily for 14 days. 56 tablet 0     [] Doxycycline Hyclate 100 MG Oral Tab Take 1 tablet (100 mg total) by mouth 2 (two) times daily for 14 days. 28 tablet 0     pantoprazole 40 MG Oral Tab EC Take 1 tablet(40 mg total) by mouth twice daily for 8 weeks, then possible daily. 120 tablet 1     [] polyethylene glycol, PEG 3350-KCl-NaBcb-NaCl-NaSulf, 236 g Oral Recon Soln Take 4,000 mL by mouth once for 1 dose. 4000 mL 0     LOSARTAN 50 MG Oral Tab TAKE 1 TABLET(50 MG) BY MOUTH TWICE DAILY 180 tablet 0     DULOXETINE 60 MG Oral Cap DR Particles TAKE 1 CAPSULE(60 MG) BY MOUTH DAILY 90 capsule 1     GABAPENTIN 100 MG Oral Cap TAKE 2 CAPSULES(200 MG) BY MOUTH EVERY NIGHT 60 capsule 2      furosemide 40 MG Oral Tab Take 1 tablet (40 mg total) by mouth daily. 90 tablet 3     Timolol Maleate 0.5 % Ophthalmic Gel Forming Solution        RHOPRESSA 0.02 % Ophthalmic Solution 1 drop by Each eye route nightly.       clotrimazole 1 % External Cream Apply 1 Application topically 2 (two) times daily. 40 g 0     LATANOPROST 0.005 % Ophthalmic Solution INSTILL 1 DROP IN BOTH EYES EVERY NIGHT 7.5 mL 0     Dorzolamide HCl-Timolol Mal 22.3-6.8 MG/ML Ophthalmic Solution   0      No current facility-administered medications for this encounter.       Allergies:   Allergies   Allergen Reactions    Diphenhydramine OTHER (SEE COMMENTS) and HIVES     Unknown reaction    Heparin OTHER (SEE COMMENTS)     Seizure  Blood clot      Penicillins HIVES       Past Medical History:    Anemia    Arthritis    Atherosclerosis of coronary artery    Back pain    Bloating    Cataract    Constipation    Eczema    Essential hypertension    Eye disease    Fibromyalgia    Flatulence/gas pain/belching    Food intolerance    Frequent urination    Glaucoma    Headache disorder    Heartburn    Hemorrhoids    High cholesterol    Hyperlipidemia    Impaired vision    Irregular bowel habits    Leg swelling    Meningioma (HCC)    Osteoarthritis    Pain in joints    Pure hypercholesterolemia    Seizure disorder (HCC)    Sleep disturbance    Wears glasses     Past Surgical History:   Procedure Laterality Date    Angiogram      Biopsy of thyroid,percut  12/15/2011    fine needle aspiration-right thyroid nodule-hyperplastic nodule    Cataract      Colonoscopy  10yrs    Excis infratent meningioma  2018    Hysterectomy      age 37          Total abdom hysterectomy       Family History   Problem Relation Age of Onset    Breast Cancer Mother 52    Uterine Cancer Mother     Diabetes Father     Heart Attack Father     Breast Cancer Sister      Social History     Tobacco Use    Smoking status: Never     Passive exposure: Never    Smokeless tobacco:  Never   Substance Use Topics    Alcohol use: No     Comment: occ       SYSTEM Check if Review is Normal Check if Physical Exam is Normal If not normal, please explain:   HEENT [x ] [x ]    NECK & BACK [x ] [x ]    HEART [x ] [x ]    LUNGS [x ] [x ]    ABDOMEN [x ] [x ]    UROGENITAL [x ] [x ]    EXTREMITIES [x ] [x ]    OTHER        [ x ] I have discussed the risks and benefits and alternatives with the patient/family.  They understand and agree to proceed with plan of care.  [ x ] I have reviewed the History and Physical done within the last 30 days.  Any changes noted above.    Jb Hickey MD

## 2024-06-19 ENCOUNTER — VIRTUAL PHONE E/M (OUTPATIENT)
Dept: UROLOGY | Facility: CLINIC | Age: 74
End: 2024-06-19
Attending: OBSTETRICS & GYNECOLOGY

## 2024-06-19 ENCOUNTER — TELEPHONE (OUTPATIENT)
Dept: PAIN CLINIC | Facility: CLINIC | Age: 74
End: 2024-06-19

## 2024-06-19 DIAGNOSIS — N32.81 DETRUSOR INSTABILITY: ICD-10-CM

## 2024-06-19 DIAGNOSIS — N95.2 POSTMENOPAUSAL ATROPHIC VAGINITIS: ICD-10-CM

## 2024-06-19 DIAGNOSIS — N81.84 PELVIC MUSCLE WASTING: ICD-10-CM

## 2024-06-19 DIAGNOSIS — N39.41 URGE INCONTINENCE: Primary | ICD-10-CM

## 2024-06-19 RX ORDER — ESTRADIOL 0.1 MG/G
CREAM VAGINAL
Qty: 42.5 G | Refills: 3 | Status: SHIPPED | OUTPATIENT
Start: 2024-06-19

## 2024-06-19 NOTE — TELEPHONE ENCOUNTER
Jordan called placed to patient for post procedure follow up. Patient stated doing well, just a little soreness. Informed patient that soreness is to be expected after the procedure. Educated patient that it takes 3-5 days for the steroid to be effective and to allow adequate time for medication to work. Encouraged patient to alternate ice and heat and to take medications as prescribed. Pt verbalized understanding to call with any questions or concerns.      Procedure: LUMBAR INTERLAMINAR EPIDURAL INJECTION with local   Date: 06/19/24  Follow up Visit Scheduled:

## 2024-06-19 NOTE — PROGRESS NOTES
This visit is being conducted as a televisit with patient's consent.  Patient is in a safe, private environment for televisit, provider located in the office setting.    Visit for f/u of PFPT  Has completed 10 sessions  Reports 90% improvement  Happy     Denies MANJULA  Rare UUI    Using vag estrogen 2x weekly, originally rx'd by GYN, requesting refill  Bowels reg    UDS 11/6/23:  181/45cc & 580/62cc  alf 385cc  DO @ 178cc  MANJULA  @ 100 ml with cough unreduced       Impression/Plan:    ICD-10-CM    1. Urge incontinence  N39.41       2. Detrusor instability  N32.81       3. Postmenopausal atrophic vaginitis  N95.2       4. Pelvic muscle wasting  N81.84           Discussion Items:   Discussed mgmt of vulvovaginal atrophy with vaginal estrogen cream. Reviewed associated benefits, risks, alternatives, and goals. Recommend low dose 2x/week treatment.    Treatment Plan:  Continue vag estrogen as prescribed  Bowel regimen  Bladder diet/drill  Pelvic floor exercises  Call with s/sx of UTI    All questions answered  She understands and agrees to plan    Return in about 1 year (around 6/19/2025) for yearly exam, UGA, UUI.    Katina Cameron PA-C    Note to patient: The 21st Century Cures Act makes medical notes like these available to patients in the interest of transparency.  However, be advised this is a medical document.  It is intended as peer to peer communication.  It is written in medical language and may contain abbreviations or verbiage that are unfamiliar.  It may appear blunt or direct.  Medical documents are intended to carry relevant information, facts as evident, and the clinical opinion of the practitioner.

## 2024-06-22 ENCOUNTER — OFFICE VISIT (OUTPATIENT)
Dept: INTERNAL MEDICINE CLINIC | Facility: CLINIC | Age: 74
End: 2024-06-22

## 2024-06-22 VITALS
WEIGHT: 174 LBS | DIASTOLIC BLOOD PRESSURE: 64 MMHG | HEART RATE: 64 BPM | SYSTOLIC BLOOD PRESSURE: 122 MMHG | BODY MASS INDEX: 32.02 KG/M2 | OXYGEN SATURATION: 97 % | HEIGHT: 62 IN

## 2024-06-22 DIAGNOSIS — N18.31 STAGE 3A CHRONIC KIDNEY DISEASE (HCC): ICD-10-CM

## 2024-06-22 DIAGNOSIS — I10 PRIMARY HYPERTENSION: Primary | ICD-10-CM

## 2024-06-22 DIAGNOSIS — R73.9 BLOOD GLUCOSE ELEVATED: ICD-10-CM

## 2024-06-22 DIAGNOSIS — K29.70 GASTRITIS DETERMINED BY ENDOSCOPY: ICD-10-CM

## 2024-06-22 DIAGNOSIS — M17.0 BILATERAL PRIMARY OSTEOARTHRITIS OF KNEE: ICD-10-CM

## 2024-06-22 PROCEDURE — 99214 OFFICE O/P EST MOD 30 MIN: CPT | Performed by: INTERNAL MEDICINE

## 2024-06-22 PROCEDURE — G2211 COMPLEX E/M VISIT ADD ON: HCPCS | Performed by: INTERNAL MEDICINE

## 2024-06-22 NOTE — PROGRESS NOTES
Glenn Howard is a 74 year old female.    Chief Complaint   Patient presents with    Follow - Up     Room 3 ac  6 month follow up       HPI:   Pleasant  patient with pre dm, HTN, HL, lumbar spinal stenosis, b/l knee OA, meningioma s/p resection here for follow up. She is accompanied by her .  She has to use cane to ambulate otherwise she falls, in fact she had a fall last week. No head injury just minor abrasion of right elbow and now soreness of muscles.   She has advanced OA of knees and planning to get Synvisc injection soon (helped for a year last time). Requesting parking placard with these issues.  She and her  do watch diet and walk, her pre dm numbers have improved. Hgba1c down from 6.2 to 6 this month  She has chronic anemia with recent hgb 11.1, cscope and egd done by Dr. Gomes. Polyp removed and she had ulcer and gastritis so placed on PPI BID. She will have rpt EGD next month. Her BP is controlled on medications, no cardiac complaints today. BP in office 122/64. Her CKD is stable with Cr 1.1, avoids nsaids and only taking tylenol prn pain    Patient Active Problem List   Diagnosis    HTN (hypertension)    Hyperlipidemia    Status post resection of meningioma    Depressed mood    Spinal stenosis of lumbar region    Vaginal atrophy    Pelvic organ prolapse quantification stage 1 cystocele    Vaginal prolapse without uterine prolapse    Vertigo    Back pain with radiculopathy    Bilateral primary osteoarthritis of knee    Knee pain    Primary localized osteoarthritis of right knee    Primary localized osteoarthrosis, lower leg    Spondylolisthesis of lumbar region    Anemia, unspecified    abdominal bloating    Special screening for malignant neoplasm of colon    Benign neoplasm of ascending colon    Benign neoplasm of sigmoid colon    Benign neoplasm of transverse colon    Benign neoplasm of descending colon    Stage 3a chronic kidney disease (HCC)    Blood glucose elevated     Gastritis determined by endoscopy     Current Outpatient Medications   Medication Sig Dispense Refill    atorvastatin 10 MG Oral Tab Take 1 tablet (10 mg total) by mouth nightly. 90 tablet 3    carvedilol 6.25 MG Oral Tab TAKE 1 TABLET BY MOUTH EVERY MORNING AND 2 TABLETS EVERY EVENING. 270 tablet 3    LOSARTAN 50 MG Oral Tab TAKE 1 TABLET(50 MG) BY MOUTH TWICE DAILY 180 tablet 0    DULOXETINE 60 MG Oral Cap DR Particles TAKE 1 CAPSULE(60 MG) BY MOUTH DAILY 90 capsule 1    GABAPENTIN 100 MG Oral Cap TAKE 2 CAPSULES(200 MG) BY MOUTH EVERY NIGHT 60 capsule 2    furosemide 40 MG Oral Tab Take 1 tablet (40 mg total) by mouth daily. 90 tablet 3    aspirin 81 MG Oral Tab EC Take 1 tablet (81 mg total) by mouth daily.      Timolol Maleate 0.5 % Ophthalmic Gel Forming Solution       RHOPRESSA 0.02 % Ophthalmic Solution 1 drop by Each eye route nightly.      LATANOPROST 0.005 % Ophthalmic Solution INSTILL 1 DROP IN BOTH EYES EVERY NIGHT 7.5 mL 0    Dorzolamide HCl-Timolol Mal 22.3-6.8 MG/ML Ophthalmic Solution   0    estradiol (ESTRACE) 0.1 MG/GM Vaginal Cream Apply 1/2 gram vaginally 2 times per week at bedtime 42.5 g 3    pantoprazole 40 MG Oral Tab EC Take 1 tablet(40 mg total) by mouth twice daily for 8 weeks, then possible daily. (Patient not taking: Reported on 6/22/2024) 120 tablet 1    clotrimazole 1 % External Cream Apply 1 Application topically 2 (two) times daily. (Patient not taking: Reported on 6/22/2024) 40 g 0      Past Medical History:    Anemia    Anxiety    Arthritis    Atherosclerosis of coronary artery    Back pain    Bloating    Cataract    Constipation    Eczema    Esophageal reflux    Essential hypertension    Eye disease    Fibromyalgia    Flatulence/gas pain/belching    Food intolerance    Frequent urination    Glaucoma    Headache disorder    Heartburn    Hemorrhoids    High cholesterol    Hyperlipidemia    Impaired vision    Irregular bowel habits    Leg swelling    Meningioma (HCC)     Osteoarthritis    Pain in joints    Pure hypercholesterolemia    Seizure disorder (HCC)    Sleep disturbance    Wears glasses      Social History:  Social History     Socioeconomic History    Marital status:    Tobacco Use    Smoking status: Never     Passive exposure: Never    Smokeless tobacco: Never   Vaping Use    Vaping status: Never Used   Substance and Sexual Activity    Alcohol use: No     Comment: occ    Drug use: No    Sexual activity: Not Currently     Partners: Male     Birth control/protection: Hysterectomy   Other Topics Concern    Caffeine Concern Yes    Stress Concern No    Weight Concern No    Special Diet No    Exercise Yes    Seat Belt Yes     Family History   Problem Relation Age of Onset    Breast Cancer Mother 52    Uterine Cancer Mother     Diabetes Father     Heart Attack Father     Breast Cancer Sister         Allergies  Allergies   Allergen Reactions    Diphenhydramine OTHER (SEE COMMENTS) and HIVES     Unknown reaction    Heparin OTHER (SEE COMMENTS)     Seizure  Blood clot      Penicillins HIVES         REVIEW OF SYSTEMS:   GENERAL HEALTH:  no fevers   RESPIRATORY: no cough  CARDIOVASCULAR: denies chest pain  GI: denies abdominal pain  : no dysuria  NEURO: denies headaches  MS: knee pain, back pain, left leg pain  PSYCH: No reported depression   HEME: No adenopathy      EXAM:   /64   Pulse 64   Ht 5' 2\" (1.575 m)   Wt 174 lb (78.9 kg)   LMP  (LMP Unknown)   SpO2 97%   BMI 31.83 kg/m²   GENERAL: well developed, well nourished,in no apparent distress  HEENT: atraumatic, normocephalic  NECK: supple,no adenopathy  LUNGS: normal rate without respiratory distress, lungs clear to auscultation  CARDIO: RRR nl S1 S2  GI: normal bowel sounds, soft, NT/ND  Spine: lumbar TTP  EXTREMITIES: b/l knee with crepitations and bony enlargement  NEURO: Alert and oriented, slow gait, needs cane for balance    ASSESSMENT AND PLAN:     Encounter Diagnoses   Name     Primary hypertension -  controlled, CPM     Gastritis determined by endoscopy- continue PPI BID, repeat EGD planned with [facility]     Blood glucose elevated- watch diet, check hgba1c     Bilateral primary osteoarthritis of knee- she has synvisc injection set up, forms filled out for parking placard, advaised to avoid nsaids due to gastritis and CKD. Ok for tylenol ES prn     Stage 3a chronic kidney disease (HCC)- stable, continue to monitor      Synvisc injection  Orders Placed This Encounter   Procedures    CBC W Differential W Platelet [E]    Comp Metabolic Panel (14)    Lipid Panel [E]    Hemoglobin A1C [E]       Meds & Refills for this Visit:  Requested Prescriptions      No prescriptions requested or ordered in this encounter       Imaging & Consults:  None    Return in about 4 months (around 10/22/2024), or if symptoms worsen or fail to improve, for chronic issues, please do labs first.  There are no Patient Instructions on file for this visit.      The patient indicates understanding of these issues and agrees to the plan.

## 2024-06-25 ENCOUNTER — HOSPITAL ENCOUNTER (OUTPATIENT)
Facility: HOSPITAL | Age: 74
Setting detail: HOSPITAL OUTPATIENT SURGERY
Discharge: HOME | End: 2024-06-25
Attending: ANESTHESIOLOGY | Admitting: ANESTHESIOLOGY

## 2024-06-25 ENCOUNTER — APPOINTMENT (OUTPATIENT)
Dept: GENERAL RADIOLOGY | Facility: HOSPITAL | Age: 74
End: 2024-06-25
Attending: ANESTHESIOLOGY

## 2024-06-25 VITALS
TEMPERATURE: 98 F | SYSTOLIC BLOOD PRESSURE: 154 MMHG | RESPIRATION RATE: 18 BRPM | DIASTOLIC BLOOD PRESSURE: 62 MMHG | HEART RATE: 55 BPM | OXYGEN SATURATION: 99 %

## 2024-06-25 PROCEDURE — 3E0U33Z INTRODUCTION OF ANTI-INFLAMMATORY INTO JOINTS, PERCUTANEOUS APPROACH: ICD-10-PCS | Performed by: ANESTHESIOLOGY

## 2024-06-25 PROCEDURE — 3E0U3BZ INTRODUCTION OF ANESTHETIC AGENT INTO JOINTS, PERCUTANEOUS APPROACH: ICD-10-PCS | Performed by: ANESTHESIOLOGY

## 2024-06-25 PROCEDURE — 20610 DRAIN/INJ JOINT/BURSA W/O US: CPT | Performed by: ANESTHESIOLOGY

## 2024-06-25 PROCEDURE — 77002 NEEDLE LOCALIZATION BY XRAY: CPT | Performed by: ANESTHESIOLOGY

## 2024-06-25 RX ORDER — LIDOCAINE HYDROCHLORIDE 10 MG/ML
INJECTION, SOLUTION EPIDURAL; INFILTRATION; INTRACAUDAL; PERINEURAL
Status: DISCONTINUED | OUTPATIENT
Start: 2024-06-25 | End: 2024-06-25

## 2024-06-25 RX ORDER — METHYLPREDNISOLONE ACETATE 40 MG/ML
INJECTION, SUSPENSION INTRA-ARTICULAR; INTRALESIONAL; INTRAMUSCULAR; SOFT TISSUE
Status: DISCONTINUED | OUTPATIENT
Start: 2024-06-25 | End: 2024-06-25

## 2024-06-25 NOTE — OPERATIVE REPORT
[facility]  Operative Report  2024     Glenn Howard Patient Status:  Hospital Outpatient Surgery    3/27/1950 MRN [id number]   Location [facility] Attending Jb Hickey MD   Hosp Day # 0 PCP Yanet Mcgowan MD     Indication: Glenn is a 74 year old female with chronic knee pain    Preoperative Diagnosis:  Chronic pain of both knees [M25.561, M25.562, G89.29]    Postoperative Diagnosis: Same as above.    Procedure performed: bilateral knees SYNVISC JOINT INJECTION with local    Anesthesia: Local  .    EBL: Less than 1 ml.    Procedure Description:   After reviewing the patient's history and performing a focused physical examination, the diagnosis was confirmed and contraindications such as infection and coagulopathy were ruled out.  Following review of potential side effects and complications, including but not necessarily limited to infection, allergic reaction, local tissue breakdown, nerve injury, and paresis, the patient indicated they understood and agreed to proceed. After obtaining the informed consent, the patient was brought to the procedure room and monitored.      The patient was brought to the procedure room and positioned supine with affected knee supported.  Fluoroscopy of the knee joint was taken in AP view.  The lateral aspect of the patellar tendon was marked.  A skin well was raised with 1% lidocaine.  Subsequently a 22-gauge 3.5 Quincke spinal needle was introduced under direct fluoroscopy under AP view.  The needle's position was confirmed by AP and lateral fluoroscopic view.  Thereafter, 1 mL Omnipaque 180 was injected.  After a good arthrogram was obtained, 6 mL of Synvisc was injected after repeated aspiration.  The needle was then flushed with 1 mL 1% lidocaine.  The similar procedure was repeated on the other side.  The patient tolerated the procedure very well and was discharged home after recovery criteria were met.  The patient was advised  to follow up with us in a one-week interval for subsequent injections, up to three.    Complications: None.    Follow up:  Clinic    Jb Hickey MD

## 2024-06-25 NOTE — H&P
History & Physical Examination    Patient Name: Glenn Howard  MRN: [id number]  [facility]: [id number]  YOB: 1950    Pre-Operative Diagnosis:  Chronic pain of both knees [M25.561, M25.562, G89.29]    Present Illness: Knee pain    ASA: 2  MP class: 1  Sedation: Local      Facility-Administered Medications Prior to Admission   Medication Dose Route Frequency Provider Last Rate Last Admin    lidocaine HCl (XYLOCAINE) 1 % injection 5 mL  5 mL Intradermal Once Hermelindo, Gaby, DO        triamcinolone acetonide (KENALOG-40) 40 MG/ML injection 40 mg  40 mg Intra-articular Once Hermelindo, Gaby, DO         Medications Prior to Admission   Medication Sig Dispense Refill Last Dose    aspirin 81 MG Oral Tab EC Take 1 tablet (81 mg total) by mouth daily.   2024 at 0800    estradiol (ESTRACE) 0.1 MG/GM Vaginal Cream Apply 1/2 gram vaginally 2 times per week at bedtime 42.5 g 3     atorvastatin 10 MG Oral Tab Take 1 tablet (10 mg total) by mouth nightly. 90 tablet 3     carvedilol 6.25 MG Oral Tab TAKE 1 TABLET BY MOUTH EVERY MORNING AND 2 TABLETS EVERY EVENING. 270 tablet 3     [] bismuth subsalicylate 262 MG Oral Chew Tab Chew 2 tablets (524 mg total) by mouth 4 (four) times daily for 14 days. 112 tablet 0     [] metRONIDAZOLE 250 MG Oral Tab Take 1 tablet (250 mg total) by mouth 4 (four) times daily for 14 days. 56 tablet 0     [] Doxycycline Hyclate 100 MG Oral Tab Take 1 tablet (100 mg total) by mouth 2 (two) times daily for 14 days. 28 tablet 0     pantoprazole 40 MG Oral Tab EC Take 1 tablet(40 mg total) by mouth twice daily for 8 weeks, then possible daily. (Patient not taking: Reported on 2024) 120 tablet 1     [] polyethylene glycol, PEG 3350-KCl-NaBcb-NaCl-NaSulf, 236 g Oral Recon Soln Take 4,000 mL by mouth once for 1 dose. 4000 mL 0     LOSARTAN 50 MG Oral Tab TAKE 1 TABLET(50 MG) BY MOUTH TWICE DAILY 180 tablet 0     DULOXETINE 60 MG Oral Cap DR Particles TAKE 1  CAPSULE(60 MG) BY MOUTH DAILY 90 capsule 1     GABAPENTIN 100 MG Oral Cap TAKE 2 CAPSULES(200 MG) BY MOUTH EVERY NIGHT 60 capsule 2     furosemide 40 MG Oral Tab Take 1 tablet (40 mg total) by mouth daily. 90 tablet 3     Timolol Maleate 0.5 % Ophthalmic Gel Forming Solution        RHOPRESSA 0.02 % Ophthalmic Solution 1 drop by Each eye route nightly.       clotrimazole 1 % External Cream Apply 1 Application topically 2 (two) times daily. (Patient not taking: Reported on 6/22/2024) 40 g 0     LATANOPROST 0.005 % Ophthalmic Solution INSTILL 1 DROP IN BOTH EYES EVERY NIGHT 7.5 mL 0     Dorzolamide HCl-Timolol Mal 22.3-6.8 MG/ML Ophthalmic Solution   0      Current Facility-Administered Medications   Medication Dose Route Frequency    hylan G-F 20 (Synvisc-One) 48 MG/6ML intra-articular injection 96 mg  12 mL Intra-articular Once       Allergies:   Allergies   Allergen Reactions    Diphenhydramine OTHER (SEE COMMENTS) and HIVES     Unknown reaction    Heparin OTHER (SEE COMMENTS)     Seizure  Blood clot      Penicillins HIVES       Past Medical History:    Anemia    Anxiety    Arthritis    Atherosclerosis of coronary artery    Back pain    Bloating    Cataract    Constipation    Eczema    Esophageal reflux    Essential hypertension    Eye disease    Fibromyalgia    Flatulence/gas pain/belching    Food intolerance    Frequent urination    Glaucoma    Headache disorder    Heartburn    Hemorrhoids    High cholesterol    Hyperlipidemia    Impaired vision    Irregular bowel habits    Leg swelling    Meningioma (HCC)    Osteoarthritis    Pain in joints    Pure hypercholesterolemia    Seizure disorder (HCC)    Sleep disturbance    Wears glasses     Past Surgical History:   Procedure Laterality Date    Angiogram      Biopsy of thyroid,percut  12/15/2011    fine needle aspiration-right thyroid nodule-hyperplastic nodule    Cataract      Colonoscopy  10yrs    Excis infratent meningioma  03/09/2018    Hysterectomy      age 37           Other surgical history  Brain surgery for tumer    Total abdom hysterectomy       Family History   Problem Relation Age of Onset    Breast Cancer Mother 52    Uterine Cancer Mother     Diabetes Father     Heart Attack Father     Breast Cancer Sister      Social History     Tobacco Use    Smoking status: Never     Passive exposure: Never    Smokeless tobacco: Never   Substance Use Topics    Alcohol use: No     Comment: occ       SYSTEM Check if Review is Normal Check if Physical Exam is Normal If not normal, please explain:   HEENT [x ] [x ]    NECK & BACK [x ] [x ]    HEART [x ] [x ]    LUNGS [x ] [x ]    ABDOMEN [x ] [x ]    UROGENITAL [x ] [x ]    EXTREMITIES [x ] [x ]    OTHER        [ x ] I have discussed the risks and benefits and alternatives with the patient/family.  They understand and agree to proceed with plan of care.  [ x ] I have reviewed the History and Physical done within the last 30 days.  Any changes noted above.    Jb Hickey MD

## 2024-06-25 NOTE — DISCHARGE INSTRUCTIONS
Home Care Instructions Following Your Pain Procedure     Glenn,  It has been a pleasure to have you as our patient. To help you at home, you must follow these general discharge instructions. We will review these with you before you are discharged. It is our hope that you have a complete and uneventful recovery from our procedure.     General Instructions:  What to Expect:  Bandages from your procedure today can be removed when you get home.  Please avoid soaking and/or swimming for 24 hours.  Showering is okay  It is normal to have increased pain symptoms and/or pain at injection site for up to 3-5 days after procedure, you can use heat or ice (20 minutes on 20 minutes off) for comfort.  You may experience some temporary side effects which may include restlessness or insomnia, flushing of the face, or heart palpitations.  Please contact the provider if these symptoms do not resolve within 3-4 days.  Lightheadedness or nausea may occur and should resolve within 24 to 48 hours.  If you develop a headache after treatment, rest, drink fluids (with caffeine, if possible) and take mild over-the-counter pain medication.  If the headache does not improve with the above treatment, contact the physician.  Home Medications:  Resume all previously prescribed medication.  Please avoid taking NSAIDs (Non-Steriodal Anti-Inflammatory Drugs) such as:  Ibuprofen ( Advil, Motrin) Aleve (Naproxen), Diclofenac, Meloxicam for 6 hours after procedure.   If you are on Coumadin (Warfarin) or any other anti-coagulant (or \"blood thinning\") medication such as Plavix (Clopidogrel), Xarelto (Rivaroxaban), Eliquis (Apixaban), Effient (Prasugrel) etc., restart on the following day from the procedure unless otherwise directed by your provider.  If you are a diabetic, please increase the frequency of your glucose monitoring after the procedure as steroids may cause a temporary (2-3 day) increase in your blood sugar.  Contact your primary care  physician if your blood sugar remains elevated as you may require some medication adjustment.  Diet:  Resume your regular diet as tolerated.  Activity:  We recommend that you relax and rest during the rest of your procedure day.  If you feel weakness in your arms or legs do not drive.  Follow-up Appointment  Please schedule a follow-up visit within 3 to 4 weeks after your last procedure date.  Question or Concerns:  Feel free to call our office with any questions or concerns at [phone] (option #2)    Glenn  Thank you for coming to [facility] for your procedure.  The nurses try very hard to make sure you receive the best care possible.  Your trust in them as well as us is greatly appreciated.    Thanks so much,   Dr. Jb Hickey

## 2024-06-26 ENCOUNTER — TELEPHONE (OUTPATIENT)
Dept: PAIN CLINIC | Facility: CLINIC | Age: 74
End: 2024-06-26

## 2024-06-26 NOTE — TELEPHONE ENCOUNTER
Jordan called placed to patient for post procedure follow up. Patient stated doing ok less. Pt verbalized understanding to call with any questions or concerns.      Procedure: bilateral knees SYNVISC JOINT INJECTION with   Date: 6/25/2024  Follow up Visit Scheduled:  7/19/2024 with

## 2024-07-03 ENCOUNTER — NURSE TRIAGE (OUTPATIENT)
Dept: INTERNAL MEDICINE CLINIC | Facility: CLINIC | Age: 74
End: 2024-07-03

## 2024-07-03 NOTE — TELEPHONE ENCOUNTER
Action Requested: Summary for Provider     []  Critical Lab, Recommendations Needed  [] Need Additional Advice  []   FYI    []   Need Orders  [] Need Medications Sent to Pharmacy  []  Other     SUMMARY: Patient reports a positive home Covid test yesterday.  Symptoms started with body aches, headache and cough.  Cough is dry, occasionally productive.  Denies fever, SOB, chest pain.  Mouth feels very dry.  Advised patient of home remedies to treat symptoms, if any worsening symptoms call office or go to IC.  Patient agreeable to plan.    Reason for call: Covid  Onset: Yesterday    Advised patient to push fluids, diet as tolerated, continue with Dayquil, mucinex, wash hands, rest.        Reason for Disposition   COVID-19 diagnosed by positive lab test (e.g., PCR, rapid self-test kit) and mild symptoms (e.g., cough, fever, others) and no complications or SOB    Protocols used: Coronavirus (COVID-19) Diagnosed or [id number]

## 2024-07-03 NOTE — TELEPHONE ENCOUNTER
Patient called in stated that she tested positive for covid-19 yesterday. Patient is asking what should do because she is on a lot of other medication. Transferred to Triage.

## 2024-07-17 ENCOUNTER — OFFICE VISIT (OUTPATIENT)
Dept: PAIN CLINIC | Facility: CLINIC | Age: 74
End: 2024-07-17
Payer: MEDICARE

## 2024-07-17 VITALS — OXYGEN SATURATION: 99 % | HEART RATE: 68 BPM | SYSTOLIC BLOOD PRESSURE: 118 MMHG | DIASTOLIC BLOOD PRESSURE: 64 MMHG

## 2024-07-17 DIAGNOSIS — M17.0 BILATERAL PRIMARY OSTEOARTHRITIS OF KNEE: Primary | ICD-10-CM

## 2024-07-17 PROCEDURE — 99214 OFFICE O/P EST MOD 30 MIN: CPT | Performed by: ANESTHESIOLOGY

## 2024-07-17 NOTE — PROGRESS NOTES
Last procedure:   bilateral knees SYNVISC JOINT INJECTION with local   Date: 6/25/24  Percentage of relief obtained: 80-85%  Duration of relief: Consistent    Current Pain Score: 4    Narcotic Contract Exp: NA

## 2024-07-17 NOTE — PROGRESS NOTES
Name: Glenn Howard   : 3/27/1950   DOS: 2024     Pain Clinic Follow Up Visit:     Chief Complaint   Patient presents with    Procedure Follow Up     bilateral knees SYNVISC JOINT INJECTION with local        Glenn Cárdenasl is a 74 year old female with a history of osteoarthritis of both knees with advanced degenerative changes here for follow-up.  The patient is status post bilateral Synvisc injection.  Reports 80 to 85% relief.  Overall pleased with results of the procedure.  Does have some pain along the medial aspect of the right knee.    Pt denies any chills, fever, or weakness. There is no bladder or bowel incontinence associated with the pain.    REVIEW OF SYSTEMS:  A ten point review of systems was performed with pertinent positives and negatives in the HPI.    Allergies   Allergen Reactions    Diphenhydramine OTHER (SEE COMMENTS) and HIVES     Unknown reaction    Heparin OTHER (SEE COMMENTS)     Seizure  Blood clot      Penicillins HIVES       Current Outpatient Medications   Medication Sig Dispense Refill    estradiol (ESTRACE) 0.1 MG/GM Vaginal Cream Apply 1/2 gram vaginally 2 times per week at bedtime 42.5 g 3    atorvastatin 10 MG Oral Tab Take 1 tablet (10 mg total) by mouth nightly. 90 tablet 3    carvedilol 6.25 MG Oral Tab TAKE 1 TABLET BY MOUTH EVERY MORNING AND 2 TABLETS EVERY EVENING. 270 tablet 3    LOSARTAN 50 MG Oral Tab TAKE 1 TABLET(50 MG) BY MOUTH TWICE DAILY 180 tablet 0    DULOXETINE 60 MG Oral Cap DR Particles TAKE 1 CAPSULE(60 MG) BY MOUTH DAILY 90 capsule 1    GABAPENTIN 100 MG Oral Cap TAKE 2 CAPSULES(200 MG) BY MOUTH EVERY NIGHT 60 capsule 2    furosemide 40 MG Oral Tab Take 1 tablet (40 mg total) by mouth daily. 90 tablet 3    aspirin 81 MG Oral Tab EC Take 1 tablet (81 mg total) by mouth daily.      Timolol Maleate 0.5 % Ophthalmic Gel Forming Solution       RHOPRESSA 0.02 % Ophthalmic Solution 1 drop by Each eye route nightly.      LATANOPROST 0.005 % Ophthalmic  Solution INSTILL 1 DROP IN BOTH EYES EVERY NIGHT 7.5 mL 0    Dorzolamide HCl-Timolol Mal 22.3-6.8 MG/ML Ophthalmic Solution   0         EXAM:   /64 (BP Location: Left arm, Patient Position: Sitting)   Pulse 68   LMP  (LMP Unknown)   SpO2 99%   General:  Patient is a(n) 74 year old year old female in no acute distress.  Neurologic:: WNL-Orientation to time, place and person, normal mood & affect, concentration & attention span intact.   Inspection:  Ambulates with well-coordinated, fluid, non-antalgic gait.  Gait is normal.  Neck: Full range of motion  Back: Gait is intact  Knees: Injection site clear range.  Stable.  Cranial nerve: Grossly intact  Respiratory: Speech nonlabored  IMAGES:     Knee x-ray reviewed with advanced degenerative changes in both knees    ASSESSMENT AND PLAN:     1. Bilateral primary osteoarthritis of knee      The patient is a very pleasant 74-year-old female with history of osteoarthritis of both knees.  She is following up after intra-articular injection with Synvisc.  Overall, doing well without complication.  Ports 85% relief which is current and sustained.  Patient encouraged to continue with Aspercreme as she is not able to take nonsteroidals.  To follow-up on as-needed basis.    Radiology orders and consultations:None  The patient indicates understanding of these issues and agrees to the plan.  No follow-ups on file.    Jb Hickey MD, 7/17/2024, 12:00 PM

## 2024-07-17 NOTE — PATIENT INSTRUCTIONS
Refill policies:    Allow 2-3 business days for refills; controlled substances may take longer.  Contact your pharmacy at least 5 days prior to running out of medication and have them send an electronic request or submit request through the “request refill” option in your [employer] account.  Refills are not addressed on weekends; covering physicians do not authorize routine medications on weekends.  No narcotics or controlled substances are refilled after noon on Fridays or by on call physicians.  By law, narcotics must be electronically prescribed.  A 30 day supply with no refills is the maximum allowed.  If your prescription is due for a refill, you may be due for a follow up appointment.  To best provide you care, patients receiving routine medications need to be seen at least once a year.  Patients receiving narcotic/controlled substance medications need to be seen at least once every 3 months.  In the event that your preferred pharmacy does not have the requested medication in stock (e.g. Backordered), it is your responsibility to find another pharmacy that has the requested medication available.  We will gladly send a new prescription to that pharmacy at your request.    Scheduling Tests:    If your physician has ordered radiology tests such as MRI or CT scans, please contact Central Scheduling at [phone] right away to schedule the test.  Once scheduled, the [facility] Centralized Referral Team will work with your insurance carrier to obtain pre-certification or prior authorization.  Depending on your insurance carrier, approval may take 3-10 days.  It is highly recommended patients assure they have received an authorization before having a test performed.  If test is done without insurance authorization, patient may be responsible for the entire amount billed.      Precertification and Prior Authorizations:  If your physician has recommended that you have a procedure or additional testing performed the [facility]  Centralized Referral Team will contact your insurance carrier to obtain pre-certification or prior authorization.    You are strongly encouraged to contact your insurance carrier to verify that your procedure/test has been approved and is a COVERED benefit.  Although the [facility] Centralized Referral Team does its due diligence, the insurance carrier gives the disclaimer that \"Although the procedure is authorized, this does not guarantee payment.\"    Ultimately the patient is responsible for payment.   Thank you for your understanding in this matter.  Paperwork Completion:  If you require FMLA or disability paperwork for your recovery, please make sure to either drop it off or have it faxed to our office at [phone]. Be sure the form has your name and date of birth on it.  The form will be faxed to our Forms Department and they will complete it for you.  There is a 25$ fee for all forms that need to be filled out.  Please be aware there is a 10-14 day turnaround time.  You will need to sign a release of information (KARIN) form if your paperwork does not come with one.  You may call the Forms Department with any questions at [phone].  Their fax number is [phone].

## 2024-07-18 ENCOUNTER — NURSE ONLY (OUTPATIENT)
Dept: LAB | Age: 74
End: 2024-07-18
Attending: INTERNAL MEDICINE
Payer: MEDICARE

## 2024-07-18 DIAGNOSIS — A04.8 HELICOBACTER PYLORI INFECTION: ICD-10-CM

## 2024-07-18 PROCEDURE — 83013 H PYLORI (C-13) BREATH: CPT

## 2024-07-19 PROBLEM — B96.81 H PYLORI ULCER: Status: ACTIVE | Noted: 2024-07-19

## 2024-07-19 PROBLEM — K27.9 H PYLORI ULCER: Status: ACTIVE | Noted: 2024-07-19

## 2024-07-19 LAB — H PYLORI BREATH TEST: NEGATIVE

## 2024-07-24 RX ORDER — LOSARTAN POTASSIUM 50 MG/1
50 TABLET ORAL 2 TIMES DAILY
Qty: 180 TABLET | Refills: 0 | Status: SHIPPED | OUTPATIENT
Start: 2024-07-24 | End: 2024-10-23

## 2024-09-05 DIAGNOSIS — G89.29 OTHER CHRONIC PAIN: ICD-10-CM

## 2024-09-09 RX ORDER — DULOXETIN HYDROCHLORIDE 60 MG/1
60 CAPSULE, DELAYED RELEASE ORAL DAILY
Qty: 90 CAPSULE | Refills: 3 | Status: SHIPPED | OUTPATIENT
Start: 2024-09-09

## 2024-09-09 NOTE — TELEPHONE ENCOUNTER
REFILL PASSED PER [facility] PROTOCOLS    Requested Prescriptions   Pending Prescriptions Disp Refills    DULOXETINE 60 MG Oral Cap DR Particles [Pharmacy Med Name: DULOXETINE DR 60MG CAPSULES] 90 capsule 1     Sig: TAKE 1 CAPSULE(60 MG) BY MOUTH DAILY       Psychiatric Non-Scheduled (Anti-Anxiety) Passed - 9/5/2024  3:23 AM        Passed - In person appointment or virtual visit in the past 6 mos or appointment in next 3 mos     Recent Outpatient Visits              1 month ago Helicobacter pylori infection    [address]    Nurse Only    1 month ago Bilateral primary osteoarthritis of knee    [facility] Jb Hickey MD    Office Visit    2 months ago Primary hypertension    [address] Yanet Mcgowan MD    Office Visit    2 months ago Urge incontinence    [facility] Katina Cameron PA    Virtual Phone E/M    3 months ago Bilateral chronic knee pain    [facility] David Blount PA    Office Visit          Future Appointments         Provider Department Appt Notes    In 1 month Yanet Mcgowan MD [address]     In 9 months Katina Cameron PA [facility] 1 YEAR UGA/UUI FOLLOW-UP                    Passed - Depression Screening completed within the past 12 months             Future Appointments         Provider Department Appt Notes    In 1 month Yanet Mcgowan MD [address]     In 9 months Katina Cameron PA [facility] 1 YEAR UGA/UUI FOLLOW-UP          Recent Outpatient Visits              1 month ago Helicobacter pylori infection    [address]    Nurse Only    1  month ago Bilateral primary osteoarthritis of knee    [facility], [facility] David Kun, MD    Office Visit    2 months ago Primary hypertension    [facility], [address] [place] [address], Yanet Willis MD    Office Visit    2 months ago Urge incontinence    [facility] Katina Cameron PA    Virtual Phone E/M    3 months ago Bilateral chronic knee pain    [facility], Naya Chawla, David Trujillo PA    Office Visit

## 2024-10-23 RX ORDER — LOSARTAN POTASSIUM 50 MG/1
50 TABLET ORAL 2 TIMES DAILY
Qty: 180 TABLET | Refills: 3 | Status: SHIPPED | OUTPATIENT
Start: 2024-10-23

## 2024-10-23 NOTE — TELEPHONE ENCOUNTER
Refill passed per [facility] protocol.  Requested Prescriptions   Pending Prescriptions Disp Refills    LOSARTAN 50 MG Oral Tab [Pharmacy Med Name: LOSARTAN 50MG TABLETS] 180 tablet 0     Sig: TAKE 1 TABLET(50 MG) BY MOUTH TWICE DAILY       Hypertension Medications Protocol Passed - 10/23/2024  8:40 AM        Passed - CMP or BMP in past 12 months        Passed - Last BP reading less than 140/90     BP Readings from Last 1 Encounters:   07/17/24 118/64               Passed - In person appointment or virtual visit in the past 12 mos or appointment in next 3 mos     Recent Outpatient Visits              3 months ago Helicobacter pylori infection    [address]    Nurse Only    3 months ago Bilateral primary osteoarthritis of knee    [facility] Jb Hickey MD    Office Visit    4 months ago Primary hypertension    [address] Yanet Mcgowan MD    Office Visit    4 months ago Urge incontinence    [facility] Katina Cameron PA    Virtual Phone E/M    4 months ago Bilateral chronic knee pain    [facility] David Blount PA    Office Visit          Future Appointments         Provider Department Appt Notes    In 4 months Yanet Mcgowan MD [address]     last cpe 2/22/24    In 7 months Katina Cameron PA [facility] 1 YEAR UGA/UUI FOLLOW-UP                    Passed - EGFRCR or GFRNAA > 50     GFR Evaluation  EGFRCR: 52 , resulted on 6/3/2024             Recent Outpatient Visits              3 months ago Helicobacter pylori infection    [address]    Nurse Only    3 months ago Bilateral primary osteoarthritis of knee    [facility],  [facility] Jb Hickey MD    Office Visit    4 months ago Primary hypertension    [address] Yanet Mcgowan MD    Office Visit    4 months ago Urge incontinence    [facility] [place] Katina Cameron PA    Virtual Phone E/M    4 months ago Bilateral chronic knee pain    [facility]    [facility]          Future Appointments         Provider Department Appt Notes    In 4 months Yanet Mcgowan MD [address]     last cpe 2/22/24    In 7 months Katina Cameron PA [facility] 1 YEAR UGA/UUI FOLLOW-UP

## 2024-10-29 ENCOUNTER — MED REC SCAN ONLY (OUTPATIENT)
Dept: INTERNAL MEDICINE CLINIC | Facility: CLINIC | Age: 74
End: 2024-10-29

## 2024-12-19 RX ORDER — NAPROXEN 500 MG/1
500 TABLET ORAL 2 TIMES DAILY PRN
Qty: 180 TABLET | Refills: 0 | OUTPATIENT
Start: 2024-12-19

## 2024-12-19 NOTE — TELEPHONE ENCOUNTER
Per office visit with Dr. Jb Hickey 7/17/24:  Patient encouraged to continue with Aspercreme as she is not able to take nonsteroidals. To follow-up on as-needed basis.       Per Pre-Procedure telephone encounter (Dr. Jb Hickey) 5/31/24:   NSAIDs:24 hours preferred    Ibuprofen (Motrin, Advil, Vicoprofen), Naproxen (Naprosyn, Aleve), Piroxcam (Feldene), Meloxicam (Mobic), Oxaprozin (Daypro), Diclofenac (Voltaren), Indomethacin (Indocin), Etodolac (Lodine), Nabumetone (Relafen), Celebrex (Celecoxib)    Medication was discontinued pain management due to patient having to hold NSAIDS for OR procedure.  ___________________________________________________________________________________    Per office visit with Dr. Gomes, [facility] 5/15/24:  She takes Naprosyn twice daily for varicose veins   __________________________________________________________________________________    Future Appointments   Date Time Provider Department Center   2/24/2025  4:00 PM Yanet Mcgowan MD [facility] [address] [facility]     Last office visit: 6/22/2024    Requested Prescriptions     Pending Prescriptions Disp Refills    NAPROXEN 500 MG Oral Tab [Pharmacy Med Name: NAPROXEN 500MG TABLETS] 180 tablet 0     Sig: TAKE 1 TABLET(500 MG) BY MOUTH TWICE DAILY WITH FOOD AS NEEDED

## 2025-01-09 ENCOUNTER — PATIENT MESSAGE (OUTPATIENT)
Dept: INTERNAL MEDICINE CLINIC | Facility: CLINIC | Age: 75
End: 2025-01-09

## 2025-01-21 DIAGNOSIS — I10 PRIMARY HYPERTENSION: ICD-10-CM

## 2025-01-21 DIAGNOSIS — R60.0 BILATERAL LEG EDEMA: ICD-10-CM

## 2025-01-24 RX ORDER — FUROSEMIDE 40 MG/1
40 TABLET ORAL DAILY
Qty: 90 TABLET | Refills: 3 | Status: SHIPPED | OUTPATIENT
Start: 2025-01-24

## 2025-01-24 NOTE — TELEPHONE ENCOUNTER
Anxiety  Patient feels symptoms are well controlled on current regimen of Paxil    Patient will continue with his medication and a refill was sent to his mail order pharmacy Refill passed [facility] protocol.

## 2025-02-12 ENCOUNTER — LABORATORY ENCOUNTER (OUTPATIENT)
Dept: LAB | Age: 75
End: 2025-02-12
Attending: INTERNAL MEDICINE
Payer: MEDICARE

## 2025-02-12 DIAGNOSIS — I10 PRIMARY HYPERTENSION: ICD-10-CM

## 2025-02-12 DIAGNOSIS — N18.31 STAGE 3A CHRONIC KIDNEY DISEASE (HCC): ICD-10-CM

## 2025-02-12 DIAGNOSIS — R73.9 BLOOD GLUCOSE ELEVATED: ICD-10-CM

## 2025-02-12 DIAGNOSIS — K29.70 GASTRITIS DETERMINED BY ENDOSCOPY: ICD-10-CM

## 2025-02-12 LAB
ALBUMIN SERPL-MCNC: 4.3 G/DL (ref 3.2–4.8)
ALBUMIN/GLOB SERPL: 1.8 {RATIO} (ref 1–2)
ALP LIVER SERPL-CCNC: 98 U/L
ALT SERPL-CCNC: 18 U/L
ANION GAP SERPL CALC-SCNC: 9 MMOL/L (ref 0–18)
AST SERPL-CCNC: 27 U/L (ref ?–34)
BASOPHILS # BLD AUTO: 0.05 X10(3) UL (ref 0–0.2)
BASOPHILS NFR BLD AUTO: 1.2 %
BILIRUB SERPL-MCNC: 0.6 MG/DL (ref 0.2–1.1)
BUN BLD-MCNC: 17 MG/DL (ref 9–23)
CALCIUM BLD-MCNC: 9.5 MG/DL (ref 8.7–10.6)
CHLORIDE SERPL-SCNC: 106 MMOL/L (ref 98–112)
CHOLEST SERPL-MCNC: 170 MG/DL (ref ?–200)
CO2 SERPL-SCNC: 24 MMOL/L (ref 21–32)
CREAT BLD-MCNC: 0.88 MG/DL
EGFRCR SERPLBLD CKD-EPI 2021: 68 ML/MIN/1.73M2 (ref 60–?)
EOSINOPHIL # BLD AUTO: 0.27 X10(3) UL (ref 0–0.7)
EOSINOPHIL NFR BLD AUTO: 6.3 %
ERYTHROCYTE [DISTWIDTH] IN BLOOD BY AUTOMATED COUNT: 14.9 %
EST. AVERAGE GLUCOSE BLD GHB EST-MCNC: 128 MG/DL (ref 68–126)
FASTING PATIENT LIPID ANSWER: YES
FASTING STATUS PATIENT QL REPORTED: YES
GLOBULIN PLAS-MCNC: 2.4 G/DL (ref 2–3.5)
GLUCOSE BLD-MCNC: 96 MG/DL (ref 70–99)
HBA1C MFR BLD: 6.1 % (ref ?–5.7)
HCT VFR BLD AUTO: 34.9 %
HDLC SERPL-MCNC: 64 MG/DL (ref 40–59)
HGB BLD-MCNC: 11.4 G/DL
IMM GRANULOCYTES # BLD AUTO: 0.01 X10(3) UL (ref 0–1)
IMM GRANULOCYTES NFR BLD: 0.2 %
LDLC SERPL CALC-MCNC: 85 MG/DL (ref ?–100)
LYMPHOCYTES # BLD AUTO: 1.6 X10(3) UL (ref 1–4)
LYMPHOCYTES NFR BLD AUTO: 37.1 %
MCH RBC QN AUTO: 29.8 PG (ref 26–34)
MCHC RBC AUTO-ENTMCNC: 32.7 G/DL (ref 31–37)
MCV RBC AUTO: 91.1 FL
MONOCYTES # BLD AUTO: 0.43 X10(3) UL (ref 0.1–1)
MONOCYTES NFR BLD AUTO: 10 %
NEUTROPHILS # BLD AUTO: 1.95 X10 (3) UL (ref 1.5–7.7)
NEUTROPHILS # BLD AUTO: 1.95 X10(3) UL (ref 1.5–7.7)
NEUTROPHILS NFR BLD AUTO: 45.2 %
NONHDLC SERPL-MCNC: 106 MG/DL (ref ?–130)
OSMOLALITY SERPL CALC.SUM OF ELEC: 289 MOSM/KG (ref 275–295)
PLATELET # BLD AUTO: 165 10(3)UL (ref 150–450)
POTASSIUM SERPL-SCNC: 4.1 MMOL/L (ref 3.5–5.1)
PROT SERPL-MCNC: 6.7 G/DL (ref 5.7–8.2)
RBC # BLD AUTO: 3.83 X10(6)UL
SODIUM SERPL-SCNC: 139 MMOL/L (ref 136–145)
TRIGL SERPL-MCNC: 120 MG/DL (ref 30–149)
VLDLC SERPL CALC-MCNC: 19 MG/DL (ref 0–30)
WBC # BLD AUTO: 4.3 X10(3) UL (ref 4–11)

## 2025-02-12 PROCEDURE — 85025 COMPLETE CBC W/AUTO DIFF WBC: CPT

## 2025-02-12 PROCEDURE — 80053 COMPREHEN METABOLIC PANEL: CPT

## 2025-02-12 PROCEDURE — 83036 HEMOGLOBIN GLYCOSYLATED A1C: CPT

## 2025-02-12 PROCEDURE — 36415 COLL VENOUS BLD VENIPUNCTURE: CPT

## 2025-02-12 PROCEDURE — 80061 LIPID PANEL: CPT

## 2025-02-28 ENCOUNTER — OFFICE VISIT (OUTPATIENT)
Dept: INTERNAL MEDICINE CLINIC | Facility: CLINIC | Age: 75
End: 2025-02-28
Payer: MEDICARE

## 2025-02-28 VITALS
HEIGHT: 62 IN | DIASTOLIC BLOOD PRESSURE: 74 MMHG | TEMPERATURE: 97 F | OXYGEN SATURATION: 98 % | HEART RATE: 64 BPM | BODY MASS INDEX: 31.83 KG/M2 | WEIGHT: 173 LBS | RESPIRATION RATE: 16 BRPM | SYSTOLIC BLOOD PRESSURE: 138 MMHG

## 2025-02-28 DIAGNOSIS — M17.0 BILATERAL PRIMARY OSTEOARTHRITIS OF KNEE: ICD-10-CM

## 2025-02-28 DIAGNOSIS — Z98.890 STATUS POST RESECTION OF MENINGIOMA: ICD-10-CM

## 2025-02-28 DIAGNOSIS — R45.89 DEPRESSED MOOD: ICD-10-CM

## 2025-02-28 DIAGNOSIS — Z86.018 STATUS POST RESECTION OF MENINGIOMA: ICD-10-CM

## 2025-02-28 DIAGNOSIS — D64.9 CHRONIC ANEMIA: ICD-10-CM

## 2025-02-28 DIAGNOSIS — R73.9 BLOOD GLUCOSE ELEVATED: ICD-10-CM

## 2025-02-28 DIAGNOSIS — N18.31 STAGE 3A CHRONIC KIDNEY DISEASE (HCC): ICD-10-CM

## 2025-02-28 DIAGNOSIS — I10 PRIMARY HYPERTENSION: ICD-10-CM

## 2025-02-28 DIAGNOSIS — E78.2 MIXED HYPERLIPIDEMIA: ICD-10-CM

## 2025-02-28 DIAGNOSIS — M54.10 BACK PAIN WITH RADICULOPATHY: ICD-10-CM

## 2025-02-28 DIAGNOSIS — Z00.00 ENCOUNTER FOR MEDICARE ANNUAL WELLNESS EXAM: Primary | ICD-10-CM

## 2025-02-28 DIAGNOSIS — I83.813 VARICOSE VEINS OF BOTH LOWER EXTREMITIES WITH PAIN: ICD-10-CM

## 2025-02-28 DIAGNOSIS — Z12.31 ENCOUNTER FOR SCREENING MAMMOGRAM FOR MALIGNANT NEOPLASM OF BREAST: ICD-10-CM

## 2025-02-28 NOTE — PROGRESS NOTES
Subjective:   Glenn Howard is a 74 year old female who presents for a Medicare Subsequent Annual Wellness visit (Pt already had Initial Annual Wellness) and scheduled follow up of multiple significant but stable problems.     1. Encounter for Medicare annual wellness exam (Primary)- referred for mammogram, she is up to date on colonoscopy and dexa scan.  She may consider shingrix at her local pharmacy  2. Primary hypertension- controlled on losartan, furosemide and carvedilol. No CP/HA/DZ  3. Mixed hyperlipidemia- controlled on atorvastatin  4. Depressed mood- doing well with duloxetine  5. Stage 3a chronic kidney disease (HCC)- stable/improved renal panel  6. Back pain with radiculopathy- she has lumbar spinal stenosis- has done PT in the past with improvement in pain and mobility.   7. Bilateral primary osteoarthritis of knee- Synvisc injections prn help with pain     8. Status post resection of meningioma- stable, no new neuro complaints  9. Varicose veins of both lower extremities with pain- she has appointment with Dr. Crisostomo in April  10. Encounter for screening mammogram for malignant neoplasm of breast  -     [facility] CHRIS 2D+3D SCREENING BILAT (CPT=77067/35639); Future; Expected date: 02/28/2025  11. Chronic anemia- improved (hgb 11.4 from 11.1), she is up to date on colonoscopy  -     CBC With Differential With Platelet; Future; Expected date: 06/01/2025  12. Blood glucose elevated, pre dm-  hgba1c elevated at 6.1     History/Other:   Fall Risk Assessment:   She has been screened for Falls and is High Risk. Fall Prevention information provided to patient in After Visit Summary.    Do you feel unsteady when standing or walking?: Yes  Do you worry about falling?: Yes  Have you fallen in the past year?: No     Cognitive Assessment:   She had a completely normal cognitive assessment - see flowsheet entries     Functional Ability/Status:   Glenn Howard has some abnormal functions as listed  below:  She has Driving difficulties based on screening of functional status. She has Meal Preparation difficulties based on screening of functional status.She has Vision problems based on screening of functional status. She has Walking problems based on screening of functional status. She has problems with Daily Activities based on screening of functional status.       Depression Screening (PHQ):  PHQ-2 SCORE: 0  , done 2/28/2025            Advanced Directives:   She does have a Living Will but we do NOT have it on file in Epic.    She does have a POA but we do NOT have it on file in Epic.    Not discussed      Patient Active Problem List   Diagnosis    HTN (hypertension)    Hyperlipidemia    Status post resection of meningioma    Depressed mood    Spinal stenosis of lumbar region    Vaginal atrophy    Pelvic organ prolapse quantification stage 1 cystocele    Vaginal prolapse without uterine prolapse    Vertigo    Back pain with radiculopathy    Bilateral primary osteoarthritis of knee    Knee pain    Primary localized osteoarthritis of right knee    Primary localized osteoarthrosis, lower leg    Spondylolisthesis of lumbar region    Anemia, unspecified    abdominal bloating    Special screening for malignant neoplasm of colon    Benign neoplasm of ascending colon    Benign neoplasm of sigmoid colon    Benign neoplasm of transverse colon    Benign neoplasm of descending colon    Stage 3a chronic kidney disease (HCC)    Blood glucose elevated    Gastritis determined by endoscopy    H pylori     Allergies:  She is allergic to diphenhydramine, heparin, and penicillins.    Current Medications:  Outpatient Medications Marked as Taking for the 2/28/25 encounter (Office Visit) with Yanet Mcgowan MD   Medication Sig    furosemide 40 MG Oral Tab Take 1 tablet (40 mg total) by mouth daily.    losartan 50 MG Oral Tab Take 1 tablet (50 mg total) by mouth 2 (two) times daily.    DULoxetine 60 MG Oral Cap DR Particles Take 1  capsule (60 mg total) by mouth daily.    atorvastatin 10 MG Oral Tab Take 1 tablet (10 mg total) by mouth nightly.    carvedilol 6.25 MG Oral Tab TAKE 1 TABLET BY MOUTH EVERY MORNING AND 2 TABLETS EVERY EVENING.    aspirin 81 MG Oral Tab EC Take 1 tablet (81 mg total) by mouth daily.    RHOPRESSA 0.02 % Ophthalmic Solution 1 drop by Each eye route nightly.    LATANOPROST 0.005 % Ophthalmic Solution INSTILL 1 DROP IN BOTH EYES EVERY NIGHT    Dorzolamide HCl-Timolol Mal 22.3-6.8 MG/ML Ophthalmic Solution      Current Facility-Administered Medications for the 25 encounter (Office Visit) with Yanet Mcgowan MD   Medication    lidocaine HCl (XYLOCAINE) 1 % injection 5 mL    triamcinolone acetonide (KENALOG-40) 40 MG/ML injection 40 mg       Medical History:  She  has a past medical history of Anemia, Anxiety, Arthritis, Atherosclerosis of coronary artery, Back pain, Bloating, Cataract, Constipation, Eczema, Esophageal reflux, Essential hypertension, Eye disease, Fibromyalgia, Flatulence/gas pain/belching, Food intolerance, Frequent urination, Glaucoma, Headache disorder, Heartburn, Hemorrhoids, High cholesterol, Hyperlipidemia, Impaired vision, Irregular bowel habits, Leg swelling (4), Meningioma (HCC), Osteoarthritis, Pain in joints, Pure hypercholesterolemia, Seizure disorder (HCC), Sleep disturbance, and Wears glasses.  Surgical History:  She  has a past surgical history that includes biopsy of thyroid,percut (12/15/2011); cataract; angiogram; hysterectomy; ; excis infratent meningioma (2018); colonoscopy (10yrs); total abdom hysterectomy; and other surgical history (Brain surgery for tumer).   Family History:  Her family history includes Breast Cancer in her sister; Breast Cancer (age of onset: 52) in her mother; Diabetes in her father; Heart Attack in her father; Uterine Cancer in her mother.  Social History:  She  reports that she has never smoked. She has never been exposed to tobacco smoke. She  has never used smokeless tobacco. She reports that she does not drink alcohol and does not use drugs.    Tobacco:  She has never smoked tobacco.    CAGE Alcohol Screen:   CAGE screening score of 0 on 2/28/2025, showing low risk of alcohol abuse.      Patient Care Team:  Yanet Mcgowan MD as PCP - General  David Jordan, PT as Physical Therapist  Pratima Parra MD as Obstetrician (OBSTETRICS & GYNECOLOGY)  Ivory Oliveira PT as Physical Therapist (Physical Therapy)  Ginger Barron PT as Physical Therapist    Review of Systems     Negative for f/c/CP/palp/SOB    Objective:   Physical Exam   GENERAL: well developed, well nourished,in no apparent distress  HEENT: atraumatic, normocephalic  NECK: supple,no adenopathy  LUNGS: normal rate without respiratory distress, lungs clear to auscultation  CARDIO: RRR nl S1 S2  GI: normal bowel sounds, soft, NT/ND  Spine: lumbar TTP  EXTREMITIES: b/l knee with crepitations and bony enlargement  NEURO: Alert and oriented, slow gait, needs cane for balance    /74   Pulse 64   Temp 96.5 °F (35.8 °C) (Temporal)   Resp 16   Ht 5' 2\" (1.575 m)   Wt 173 lb (78.5 kg)   LMP  (LMP Unknown)   SpO2 98%   BMI 31.64 kg/m²  Estimated body mass index is 31.64 kg/m² as calculated from the following:    Height as of this encounter: 5' 2\" (1.575 m).    Weight as of this encounter: 173 lb (78.5 kg).    Medicare Hearing Assessment:   Finger rub wnl  {Tip  Hearing Screening incomplete. Refresh to ensure screening pulls in; if not, click link above to assess, then refresh:830    Visual Acuity:   Right Eye Visual Acuity: Corrected Right Eye Chart Acuity: 20/50   Left Eye Visual Acuity: Uncorrected Left Eye Chart Acuity: 20/70   Both Eyes Visual Acuity: Uncorrected Both Eyes Chart Acuity: 20/50   Able To Tolerate Visual Acuity: Yes        Assessment & Plan:   Glenn Howard is a 74 year old female who presents for a Medicare Assessment.     1. Encounter for Medicare annual  wellness exam (Primary)- referred for mammogram, she is up to date on colonoscopy and dexa scan.  She may consider shingrix at her local pharmacy  2. Primary hypertension- controlled on losartan, furosemide and carvedilol  3. Mixed hyperlipidemia- controlled on atorvastatin  4. Depressed mood- doing well with duloxetine  5. Stage 3a chronic kidney disease (HCC)- stable/improved, push water and limit nsaids  6. Back pain with radiculopathy- she has lumbar spinal stenosis- has done PT in the past with improvement in pain and mobility. Referred back to PT  7. Bilateral primary osteoarthritis of knee- continue Synvisc injections prn, referred to PT     8. Status post resection of meningioma- stable, no new neuro complaints  9. Varicose veins of both lower extremities with pain- she has appointment with Dr. Crisostomo in April, elevate legs when possible  10. Encounter for screening mammogram for malignant neoplasm of breast  -     [facility] [place] 2D+3D SCREENING BILAT (CPT=77067/13462); Future; Expected date: 02/28/2025  11. Chronic anemia- improved (hgb 11.4 from 11.1), she is up to date on colonoscopy  -     CBC With Differential With Platelet; Future; Expected date: 06/01/2025  12. Blood glucose elevated, pre dm- watch diet, monitor hgba1c  -     Hemoglobin A1C; Future; Expected date: 06/01/2025    The patient indicates understanding of these issues and agrees to the plan.  Continue with current treatment plan.  Reinforced healthy diet, lifestyle, and exercise.      Return in about 5 months (around 8/4/2025), or if symptoms worsen or fail to improve, for follow up chronic issues.     Yanet Mcgowan MD, 2/28/2025     Supplementary Documentation:   General Health:  In the past six months, have you lost more than 10 pounds without trying?: 2 - No  Has your appetite been poor?: No  Type of Diet: Balanced  How does the patient maintain a good energy level?: Stretching  How would you describe your daily physical activity?:  Light  How would you describe your current health state?: Fair  How do you maintain positive mental well-being?: Social Interaction  On a scale of 0 to 10, with 0 being no pain and 10 being severe pain, what is your pain level?: 3 - (Mild)  In the past six months, have you experienced urine leakage?: 0-No  At any time do you feel concerned for the safety/well-being of yourself and/or your children, in your home or elsewhere?: No  Have you had any immunizations at another office such as Influenza, Hepatitis B, Tetanus, or Pneumococcal?: Yes    Health Maintenance   Topic Date Due    Zoster Vaccines (2 of 3) 02/26/2013    Annual Depression Screening  01/01/2025    Annual Physical  02/22/2025    COVID-19 Vaccine (9 - 2024-25 season) 07/06/2025    Colorectal Cancer Screening  05/20/2027    Influenza Vaccine  Completed    DEXA Scan  Completed    Fall Risk Screening (Annual)  Completed    Pneumococcal Vaccine: 50+ Years  Completed    Meningococcal B Vaccine  Aged Out    Mammogram  Discontinued

## 2025-06-11 ENCOUNTER — TELEPHONE (OUTPATIENT)
Dept: UROLOGY | Facility: CLINIC | Age: 75
End: 2025-06-11

## 2025-06-11 NOTE — TELEPHONE ENCOUNTER
LVM for patient on both phone numbers to remind of yearly appointment scheduled on 6/18/25 with Katina.  Reminded patient of appointment date/time and clinic address. Left office phone number to call back if needed.

## 2025-06-16 DIAGNOSIS — E78.00 PURE HYPERCHOLESTEROLEMIA: ICD-10-CM

## 2025-06-17 RX ORDER — CARVEDILOL 6.25 MG/1
6.25 TABLET ORAL EVERY MORNING
Qty: 270 TABLET | Refills: 3 | Status: SHIPPED | OUTPATIENT
Start: 2025-06-17 | End: 2025-06-18

## 2025-06-17 RX ORDER — ATORVASTATIN CALCIUM 10 MG/1
10 TABLET, FILM COATED ORAL NIGHTLY
Qty: 90 TABLET | Refills: 3 | Status: SHIPPED | OUTPATIENT
Start: 2025-06-17

## 2025-06-17 NOTE — TELEPHONE ENCOUNTER
Refill passes per [facility] protocol.    No future appointments with primary care medicine

## 2025-06-18 ENCOUNTER — OFFICE VISIT (OUTPATIENT)
Dept: UROLOGY | Facility: CLINIC | Age: 75
End: 2025-06-18
Attending: OBSTETRICS & GYNECOLOGY
Payer: MEDICARE

## 2025-06-18 VITALS — HEIGHT: 62 IN | BODY MASS INDEX: 32 KG/M2 | TEMPERATURE: 97 F | RESPIRATION RATE: 16 BRPM

## 2025-06-18 DIAGNOSIS — N32.81 DETRUSOR INSTABILITY: ICD-10-CM

## 2025-06-18 DIAGNOSIS — N95.2 POSTMENOPAUSAL ATROPHIC VAGINITIS: ICD-10-CM

## 2025-06-18 DIAGNOSIS — N81.84 PELVIC MUSCLE WASTING: ICD-10-CM

## 2025-06-18 DIAGNOSIS — N39.41 URGE INCONTINENCE: Primary | ICD-10-CM

## 2025-06-18 PROCEDURE — 99212 OFFICE O/P EST SF 10 MIN: CPT

## 2025-06-18 RX ORDER — ESTRADIOL 0.1 MG/G
CREAM VAGINAL
Qty: 42.5 G | Refills: 3 | Status: SHIPPED | OUTPATIENT
Start: 2025-06-18

## 2025-06-18 RX ORDER — CARVEDILOL 6.25 MG/1
TABLET ORAL
Qty: 270 TABLET | Refills: 3 | Status: SHIPPED | OUTPATIENT
Start: 2025-06-18

## 2025-06-18 NOTE — TELEPHONE ENCOUNTER
Sandra pharmacist with [facility] pharmacy calling to clarify Rx received:    Outpatient Medication Detail   Disp Refills Start End   carvedilol 6.25 MG Oral Tab 270 tablet 3 6/17/2025 --   Sig - Route: Take 1 tablet (6.25 mg total) by mouth every morning. - Oral   Sent to pharmacy as: Carvedilol 6.25 MG Oral Tablet (Coreg)   E-Prescribing Status: Receipt confirmed by pharmacy (6/17/2025  2:35 PM CDT)     Pharmacy  [id number] [address] [facility], [phone], [phone]     Sandra asking if the Rx refill Sig: take 1 tablet daily #270 is correct.  Pt was previously taking 1 tablet every morning and 2 tablets every evening #270 (3 month supply). Please advise.

## 2025-06-18 NOTE — PROGRESS NOTES
Patient presents to follow up DO/UUI and UGA    UUI sx stable  Denies MANJULA  Denies bulge sx    Using vag estrogen 2x weekly  Doing some home PF exercises    Bowels constipated, eats prunes  Denies current UTI s/sx    Previously tried:  PFPT (90% improvement)    UDS 11/6/23:  181/45cc & 580/62cc  USP 385cc  DO @ 178cc  MANJULA  @ 100 ml with cough unreduced     Urogynecology Summary:  Urogynecology Summary  Prolapse: No  MANJULA: No  Urge Incontinence: No  Frequency: 2 - 3 hours  Incomplete emptying: No  Constipation: Yes  Wears pad day?: 1 (light)  Wears Pad Night?: 1 (light)  Activities are limited by UI/POP?: No  Currently Sexually Active: No  Avoids sexual activity due to: Pain    Vitals:  Temp 97 °F (36.1 °C)   Resp 16   Ht 62\"   LMP  (LMP Unknown)   BMI 31.64 kg/m²     GENERAL EXAM:  GENERAL: alert & oriented, NAD  HEENT: NC/AT, sclera without injection  SKIN: no rashes  LUNGS:  without increased respiratory effort  ABDOMEN: soft, non-tender, non-distended, no masses appreciated  EXT: no edema    PELVIC EXAM: KERRIE Barnett, present for exam as a chaperone  Ext. Gen: +atrophy, no lesions  Urethra: +atrophy, nontender  Bladder: no fullness, nontender  Vagina: +atrophy  Cervix & Uterus: absent  Adnexa:no masses, nontender  Perineum: nontender  Anus: wnl  Rectum: defer     PELVIS FLOOR NEUROMUSCULAR FUNCTION:  Strength:  2, +recruitment & valsalva  Perineal Sensation:  Normal        PELVIC SUPPORT:  [place]:  0  Ant:  1  Post:  2  CST:  positive  UVJ: somewhat hypermobile    Impression/Plan:    ICD-10-CM    1. Urge incontinence  N39.41       2. Detrusor instability  N32.81       3. Postmenopausal atrophic vaginitis  N95.2 estradiol (ESTRACE) 0.1 MG/GM Vaginal Cream      4. Pelvic muscle wasting  N81.84           Discussion Items:   Bowel management reviewed. Discussed using fiber daily w/ addition of miralax as needed.    Discussed mgmt of vulvovaginal atrophy with vaginal estrogen cream. Reviewed associated benefits, risks,  alternatives, and goals. Recommend low dose 2x/week treatment.    Treatment Plan:  Continue vag estrogen cream as prescribed  Bowel regimen  Bladder diet/drill  Pelvic floor exercises  Call with s/sx of UTI    All questions answered  She understands and agrees to plan    Return in about 1 year (around 6/18/2026) for UUI, UGA, yearly exam.    Katina Cameron PA-C    Note to patient: The 21st Century Cures Act makes medical notes like these available to patients in the interest of transparency.  However, be advised this is a medical document.  It is intended as peer to peer communication.  It is written in medical language and may contain abbreviations or verbiage that are unfamiliar.  It may appear blunt or direct.  Medical documents are intended to carry relevant information, facts as evident, and the clinical opinion of the practitioner.

## 2025-06-19 NOTE — TELEPHONE ENCOUNTER
Called and spoke with patient. Patient stated that her  is a pharmacist and helps her with her medications. Patient believes she takes Carvedilol 1 tab in the morning and 1 tab at night. Patient stated when her  gets home she will confirm and call us back.

## 2025-06-19 NOTE — TELEPHONE ENCOUNTER
Glenn Kalathiveetil requesting Medication Refill for:    Medication name and dose (copy and paste from medication list)   Medication Quantity Refills Start End   carvedilol 6.25 MG Oral Tab 270 tablet 3 2025 --   Si tab in am and 2 tabs in pm     Route:   (none)     Order #:   [id number]         Preferred Pharmacy: [facility] #[id number]    LOV: @HÉCTORSTOFFICE VISITWME@  Last Refill date:   Next Scheduled appointment:   Future Appointments   Date Time Provider Department Center

## 2025-06-19 NOTE — TELEPHONE ENCOUNTER
Patient and spouse returned call. They confirm patient takes Carvedilol 6.25 mg 1 tablet in AM and 2 tablets in PM. Advised them that was how refill was sent yesterday. They confirm understanding.

## 2025-06-19 NOTE — TELEPHONE ENCOUNTER
Pharmacist Jo-Ann called to verify pt's Carvedilol dose. RN reviewed LOV note, refill encounter, and chart communication- advised pharmacist directions for 1tab in AM and 2tab in PM communication noted in pt's chart, she verbalized understanding. Will dispense 90-day supply.

## 2025-06-19 NOTE — TELEPHONE ENCOUNTER
Routed pended order to [facility] refill for processing.

## 2025-06-23 ENCOUNTER — TELEPHONE (OUTPATIENT)
Dept: ORTHOPEDICS CLINIC | Facility: CLINIC | Age: 75
End: 2025-06-23

## 2025-06-23 RX ORDER — CARVEDILOL 6.25 MG/1
TABLET ORAL
Qty: 270 TABLET | Refills: 3 | OUTPATIENT
Start: 2025-06-23

## 2025-06-23 NOTE — TELEPHONE ENCOUNTER
The patient was initially scheduled with Dr. Parmar to address her bilateral knee pain. However, Dr. Parmar does not see patients over the age of 70 for knee-related concerns.    I have rescheduled her appointment with Dr. Lazaro at the same location and time.    A voicemail has been left explaining the change. If the patient calls back, please inform her that the appointment was moved due to Dr. Parmar’s age-related policy for knee patients.

## 2025-08-02 ENCOUNTER — TELEPHONE (OUTPATIENT)
Dept: INTERNAL MEDICINE CLINIC | Facility: CLINIC | Age: 75
End: 2025-08-02

## 2025-08-06 ENCOUNTER — OFFICE VISIT (OUTPATIENT)
Dept: PAIN CLINIC | Facility: CLINIC | Age: 75
End: 2025-08-06

## 2025-08-06 VITALS — OXYGEN SATURATION: 96 % | DIASTOLIC BLOOD PRESSURE: 62 MMHG | SYSTOLIC BLOOD PRESSURE: 130 MMHG | HEART RATE: 73 BPM

## 2025-08-06 DIAGNOSIS — M17.0 BILATERAL PRIMARY OSTEOARTHRITIS OF KNEE: Primary | ICD-10-CM

## 2025-08-06 PROCEDURE — 99214 OFFICE O/P EST MOD 30 MIN: CPT | Performed by: ANESTHESIOLOGY

## 2025-08-06 PROCEDURE — G2211 COMPLEX E/M VISIT ADD ON: HCPCS | Performed by: ANESTHESIOLOGY

## 2025-08-14 ENCOUNTER — HOSPITAL ENCOUNTER (OUTPATIENT)
Dept: MAMMOGRAPHY | Age: 75
Discharge: HOME | End: 2025-08-14
Attending: INTERNAL MEDICINE

## 2025-08-14 DIAGNOSIS — Z12.31 ENCOUNTER FOR SCREENING MAMMOGRAM FOR MALIGNANT NEOPLASM OF BREAST: ICD-10-CM

## 2025-08-14 PROCEDURE — 77067 SCR MAMMO BI INCL CAD: CPT | Performed by: INTERNAL MEDICINE

## 2025-08-14 PROCEDURE — 77063 BREAST TOMOSYNTHESIS BI: CPT | Performed by: INTERNAL MEDICINE

## 2025-08-22 ENCOUNTER — LAB ENCOUNTER (OUTPATIENT)
Dept: LAB | Age: 75
End: 2025-08-22
Attending: INTERNAL MEDICINE

## 2025-08-22 DIAGNOSIS — E78.2 MIXED HYPERLIPIDEMIA: ICD-10-CM

## 2025-08-22 DIAGNOSIS — N18.31 STAGE 3A CHRONIC KIDNEY DISEASE (HCC): ICD-10-CM

## 2025-08-22 DIAGNOSIS — R73.9 BLOOD GLUCOSE ELEVATED: ICD-10-CM

## 2025-08-22 DIAGNOSIS — D64.9 CHRONIC ANEMIA: ICD-10-CM

## 2025-08-22 LAB
ALBUMIN SERPL-MCNC: 4.1 G/DL (ref 3.2–4.8)
ALBUMIN/GLOB SERPL: 1.9 (ref 1–2)
ALP LIVER SERPL-CCNC: 99 U/L (ref 55–142)
ALT SERPL-CCNC: 19 U/L (ref 10–49)
ANION GAP SERPL CALC-SCNC: 14 MMOL/L (ref 0–18)
AST SERPL-CCNC: 24 U/L (ref ?–34)
BASOPHILS # BLD AUTO: 0.03 X10(3) UL (ref 0–0.2)
BASOPHILS NFR BLD AUTO: 0.7 %
BILIRUB SERPL-MCNC: 0.6 MG/DL (ref 0.2–1.1)
BUN BLD-MCNC: 18 MG/DL (ref 9–23)
CALCIUM BLD-MCNC: 9.9 MG/DL (ref 8.7–10.6)
CHLORIDE SERPL-SCNC: 106 MMOL/L (ref 98–112)
CO2 SERPL-SCNC: 22 MMOL/L (ref 21–32)
CREAT BLD-MCNC: 0.83 MG/DL (ref 0.55–1.02)
EGFRCR SERPLBLD CKD-EPI 2021: 73 ML/MIN/1.73M2 (ref 60–?)
EOSINOPHIL # BLD AUTO: 0.27 X10(3) UL (ref 0–0.7)
EOSINOPHIL NFR BLD AUTO: 6.3 %
ERYTHROCYTE [DISTWIDTH] IN BLOOD BY AUTOMATED COUNT: 13.5 %
EST. AVERAGE GLUCOSE BLD GHB EST-MCNC: 128 MG/DL (ref 68–126)
FASTING STATUS PATIENT QL REPORTED: YES
GLOBULIN PLAS-MCNC: 2.2 G/DL (ref 2–3.5)
GLUCOSE BLD-MCNC: 105 MG/DL (ref 70–99)
HBA1C MFR BLD: 6.1 % (ref ?–5.7)
HCT VFR BLD AUTO: 35.7 % (ref 35–48)
HGB BLD-MCNC: 11.3 G/DL (ref 12–16)
IMM GRANULOCYTES # BLD AUTO: 0 X10(3) UL (ref 0–1)
IMM GRANULOCYTES NFR BLD: 0 %
LYMPHOCYTES # BLD AUTO: 1.63 X10(3) UL (ref 1–4)
LYMPHOCYTES NFR BLD AUTO: 38 %
MCH RBC QN AUTO: 29.9 PG (ref 26–34)
MCHC RBC AUTO-ENTMCNC: 31.7 G/DL (ref 31–37)
MCV RBC AUTO: 94.4 FL (ref 80–100)
MONOCYTES # BLD AUTO: 0.51 X10(3) UL (ref 0.1–1)
MONOCYTES NFR BLD AUTO: 11.9 %
NEUTROPHILS # BLD AUTO: 1.85 X10 (3) UL (ref 1.5–7.7)
NEUTROPHILS # BLD AUTO: 1.85 X10(3) UL (ref 1.5–7.7)
NEUTROPHILS NFR BLD AUTO: 43.1 %
OSMOLALITY SERPL CALC.SUM OF ELEC: 296 MOSM/KG (ref 275–295)
PLATELET # BLD AUTO: 163 10(3)UL (ref 150–450)
POTASSIUM SERPL-SCNC: 4.4 MMOL/L (ref 3.5–5.1)
PROT SERPL-MCNC: 6.3 G/DL (ref 5.7–8.2)
RBC # BLD AUTO: 3.78 X10(6)UL (ref 3.8–5.3)
SODIUM SERPL-SCNC: 142 MMOL/L (ref 136–145)
WBC # BLD AUTO: 4.3 X10(3) UL (ref 4–11)

## 2025-08-22 PROCEDURE — 36415 COLL VENOUS BLD VENIPUNCTURE: CPT

## 2025-08-22 PROCEDURE — 83036 HEMOGLOBIN GLYCOSYLATED A1C: CPT

## 2025-08-22 PROCEDURE — 85025 COMPLETE CBC W/AUTO DIFF WBC: CPT

## 2025-08-22 PROCEDURE — 80053 COMPREHEN METABOLIC PANEL: CPT

## 2025-08-28 ENCOUNTER — OFFICE VISIT (OUTPATIENT)
Dept: INTERNAL MEDICINE CLINIC | Facility: CLINIC | Age: 75
End: 2025-08-28

## 2025-08-28 VITALS
TEMPERATURE: 97 F | WEIGHT: 166.63 LBS | HEIGHT: 60.24 IN | BODY MASS INDEX: 32.29 KG/M2 | HEART RATE: 68 BPM | SYSTOLIC BLOOD PRESSURE: 136 MMHG | OXYGEN SATURATION: 98 % | DIASTOLIC BLOOD PRESSURE: 80 MMHG | RESPIRATION RATE: 16 BRPM

## 2025-08-28 DIAGNOSIS — E78.00 HIGH CHOLESTEROL: ICD-10-CM

## 2025-08-28 DIAGNOSIS — M17.11 PRIMARY OSTEOARTHRITIS OF RIGHT KNEE: ICD-10-CM

## 2025-08-28 DIAGNOSIS — D64.9 CHRONIC ANEMIA: ICD-10-CM

## 2025-08-28 DIAGNOSIS — I10 ESSENTIAL HYPERTENSION: Primary | ICD-10-CM

## 2025-08-28 DIAGNOSIS — R73.9 BLOOD GLUCOSE ELEVATED: ICD-10-CM

## 2025-08-28 DIAGNOSIS — R05.9 COUGH, UNSPECIFIED TYPE: ICD-10-CM

## 2025-08-28 PROCEDURE — 99214 OFFICE O/P EST MOD 30 MIN: CPT | Performed by: INTERNAL MEDICINE

## 2025-08-28 PROCEDURE — G2211 COMPLEX E/M VISIT ADD ON: HCPCS | Performed by: INTERNAL MEDICINE

## 2025-08-28 RX ORDER — OMEPRAZOLE 40 MG/1
40 CAPSULE, DELAYED RELEASE ORAL DAILY
Qty: 30 CAPSULE | Refills: 0 | Status: SHIPPED | OUTPATIENT
Start: 2025-08-28 | End: 2026-08-23

## (undated) DIAGNOSIS — G89.4 CHRONIC PAIN SYNDROME: ICD-10-CM

## (undated) DIAGNOSIS — E78.00 PURE HYPERCHOLESTEROLEMIA: ICD-10-CM

## (undated) DIAGNOSIS — R42 DIZZINESS: Primary | ICD-10-CM

## (undated) DIAGNOSIS — R45.89 DEPRESSED MOOD: ICD-10-CM

## (undated) DEVICE — SYRINGE 10ML SLIP TIP LOSS OF RESIST PLAS

## (undated) DEVICE — GLOVE SURG SENSICARE SZ 7

## (undated) DEVICE — PAIN TRAY: Brand: MEDLINE INDUSTRIES, INC.

## (undated) DEVICE — SKIN MARKER DUAL TIP WITH RULER CAP AND LABELS: Brand: DEVON

## (undated) DEVICE — REMOVER PREP SOLUTION 4OZ

## (undated) DEVICE — GLOVE SURG SENSICARE SZ 7-1/2

## (undated) DEVICE — GLOVE,SURG,SENSICARE,ALOE,LF,PF,7: Brand: MEDLINE

## (undated) DEVICE — GLOVE SURG SENSICARE SZ 6-1/2

## (undated) DEVICE — REMOVER LOT 4OZ N IRRIG UNSCNT SFT MOIST LIQ

## (undated) DEVICE — SKIN REG/FINE DUAL MARKER, RULER, LABELS: Brand: MEDLINE

## (undated) DEVICE — NEEDLE SPNL 22GA L3.5IN BLK QNCKE STYL DISP

## (undated) DEVICE — AVANOS* TUOHY EPIDURAL NEEDLE: Brand: AVANOS

## (undated) DEVICE — GLOVE SUR 7.5 SENSICARE PIP WHT PWD F

## (undated) DEVICE — Device

## (undated) DEVICE — BANDAID CURAD 3IN X 1IN

## (undated) DEVICE — GLOVE SUR 6.5 SENSICARE PIP WHT PWD F

## (undated) DEVICE — BANDAGE ADH 1INX3IN NAT FAB N ADH PD CURAD

## (undated) DEVICE — NEEDLE SPINAL 22X3-1/2 BLK

## (undated) NOTE — MR AVS SNAPSHOT
[facility] [address] [phone]               Thank you for choosing us for your health care visit with Lord Armond MD.  We are glad to serve you and happy to provide you with this summar famoTIDine 20 MG Tabs   Take 1 tablet by mouth 2 (two) times daily. Commonly known as:  PEPCID           ferrous sulfate 325 (65 FE) MG Tbec   Take 325 mg by mouth 2 (two) times daily with meals.            furosemide 20 MG Tabs   Take 2 tablets ( Your physician has referred you to a specialist.  Your physician or the clinic staff will provide you with the phone number you should call to schedule your appointment.      If you are confident that your benefit plan will not require a referral or authori Eat plenty of protein, keep the fat content low Sugars:  sodas and sports drinks, candies and desserts   Eat plenty of low-fat dairy products High fat meats and dairy   Choose whole grain products Foods high in sodium   Water is best for hydration Fast carlos

## (undated) NOTE — LETTER
ANNALISA Notifier: Austyn/[employer]   MOOK. Patient Name: Ban Murdock.  Identification Number: [id number]      Advance Beneficiary Notice of Noncoverage (ABN)  NOTE:  If Medicare doesn’t pay for D.pap,breast and/or pelvic exam below, you may have t deductibles. ? OPTION 2. I want the D. pap,breast and/or pelvic exam  listed above, but do not bill Medicare. You may ask to be paid now as I am responsible for payment. I cannot appeal if Medicare is not billed. ? OPTION 3.  I don’t want the D.papdanya

## (undated) NOTE — MR AVS SNAPSHOT
[facility] [place] [id number] [phone]               Thank you for choosing us for your health care visit with Maddison Baumann MD.  We are glad to serve you and happy to provide you with this summar Take 1 tablet by mouth 2 (two) times daily. Commonly known as:  PEPCID           ferrous sulfate 325 (65 FE) MG Tbec   Take 325 mg by mouth 2 (two) times daily with meals. furosemide 20 MG Tabs   Take 2 tablets (40 mg total) by mouth daily.    C Today's Orders     XR CHEST PA + LAT CHEST (CPT=71020)    Complete by:  Jan 19, 2017 (Approximate)    Assoc Dx:  Persistent cough [R05], Wheezing [R06.2]                 Follow-up Instructions     Return if symptoms worsen or fail to improve.       Sched

## (undated) NOTE — LETTER
21    Patient: Mauro Sunshine  : 3/27/1950 Visit date: 2021    Dear  Sandy Schofield MD    Thank you for referring Mauro Sunshine to my practice. Please find my assessment and plan below.           Sincerely,       Miah Cole

## (undated) NOTE — Clinical Note
David Concepcion - I saw Abena Jews today with bulge and bladder sx. I've recommended bladder testing. I will work to manage her sx. I appreciate the opportunity to participate in her care.  Thanks, [employer]

## (undated) NOTE — LETTER
21    Patient: Christiano Sanders  : 3/27/1950 Visit date: 2021    Dear  Abbi Hartman MD    Thank you for referring Christiano Sanders to my practice. Please find my assessment and plan below.     History of Present Illness  71-year-ol the right, no reflux on the left. Moderate reflux is seen in the right leg in the deep system and mild reflux in the left leg deep system  No clots are noted. Multiple varicosities in the right knee and distal thigh pain to the greater saphenous vein.   L

## (undated) NOTE — LETTER
ANNALISA Notifier: Austyn/[employer] V[employer]   SHAHEED Patient Name: Tj Randall. Identification Number: [id number]      Advance Beneficiary Notice of Noncoverage (ABN)  NOTE:  If Medicare doesn’t pay for D.  Pap, Pelvic,& Breast Exam below, you may have to p or deductibles. ? OPTION 2. I want the D. Pap, Pelvic,& Breast Exam listed above, but do not bill Medicare. You may ask to be paid now as I am responsible for payment. I cannot appeal if Medicare is not billed. ? OPTION 3. I don’t want the D.  Pap, Pelv

## (undated) NOTE — LETTER
Patient Name: Avery Lee  YOB: 1950          MRN :  [id number]  Date:  2/10/2022  Referring Physician:  Dr Gina Haile  Discharge Summary    Pt has attended 8 visits in Physical Therapy. Dx: Vaginal prolapse without uterine prolapse (N81.10)    Subjective:Not having feeling of vaginal tissue coming out anymore. Having 50% less urgency. Straining some times with bowel movements-diet not filled with as much fiber recently. Usually not getting up at night. Still using strategies that she learned in therapy. Doing urge deferment. Not having feeling of urine coming out anymore. Feels like she is emptying her bladder fully now. Has feeling of prolapse some times, chela with standing. Pain: 0/10    Pt describes pain level: current 0/10, best 0/10, worst 7/10. -back/sciatic    Occupation/Activitipt es: retired, watch [employer], used to walk more but Covid affecting pt going to the gym  PFDI-20: 58.33/300;  Impairment= 19.4 %    Objective: pt amb into dept with [employer] [facility] hand, slow transfers           URINARY HABITS  Types of symptoms: incomplete emptying, nocturia and other: urgency-RESOLVED  Abdominal/Vaginal Pressure complaints: yes-LESSENED  Urinary Frequency:  Every 2 hours (was: 1-2 hours)  Pad use: pantyliner for pelvic floor support  Nocturia: typically 0x (was:1x)  Hovering: no (was: yes)  Empty bladder just in case: yes-ONLY WHEN LEAVING THE HOUSE    BOWEL HABITS  Types of symptoms: Constipation -occasionally-PT REPORTS THAT SHE HASN'T BEEN EATING VEGETABLES/FRUITS  Frequency of bowel movements: daily and some times every other day  Stool consistency: [place] Stool Scale: 1, 3, 4      Gait: pt ambulates on level ground with assistive device of SBQC, antalgia, decreased step length L and decreased stance phase L, + trendelenberg on L w R LE weightbearing    External Palpation: L lumbar/sacral paraspinal tightness    Range Of Motion  Lumbar AROM screen: wfl-slow, cautious movements  LE AROM screen: grossly WNL except: L LE weakness, 50% AROM L hip all directions (not re-tested)     Strength (MMT) 5/5 JOHN LE except L hip 3-/5 (not re-tested)  Transverse Abdominis: 2/5 (was:1/5)    External Observation:   Voluntary contraction: present   Voluntary relaxation: present  Involuntary contraction: absent  Involuntary relaxation: present      Internal Examination     Pelvic Floor Muscle strength: (PERF= Power/Endurance/Reps/Fast) MMT: 2/3/6/3 (WAS:1/3/3/unable to do)  Accessory Muscle Use: gluteals, adductors, abdominals-VERBAL CUEING FOR IMPROVING ISOLATED PELVIC FLOOR CONTRACTIONS    Tissue Laxity Test:  Anterior Wall: Mod  Posterior Wall: WNL  Apical: WNL-no uterus    Bearing down Valsalva maneuver (2-3x): + cystoclele    Internal Palpation: WNL except Bulbocavernosus L minimal restriction and tenderness, tenderness at introitus and surrounding region secondary to prolapse, no tenderness obturator internus     Assessment:Pt has made improvement in physical therapy with increased pelvic floor strength-minimal, increased core strength, and function with urge deferment  Almost all goals met. Pt motivated to continue HEP. Answered all questions regarding: pelvic floor muscles -pessary, options for prolapse. Reviewed prolapse do's and dont's , chela strategy with lifting grandchildren. Pt reminded of not increasing intra-abdominal pressure onto pelvic floor muscles with abdominal bracing and performing pelvic brace exercises. Goals:   Goals: (to be met in 10 visits)-  1. Independent in HEP and progression with improved understanding of bladder/bowel health, bladder retraining and long-term management. -MET  2. Patient will demonstrate improved bladder voiding habits to voiding every 2 hours without increased urgency-MET  3.  Patient will demonstrate improve PFM isolation, coordination and strength to 2/6/6/6 so she is able to reduce urinary urge and decrease sensation of prolapse during ADL's.-PROGRESSING  4. Pt to have increased Transverse abdominis strength to 2/10 to assist pelvic floor muscles with lifting-MET  5. PFM contraction before increase intra-abdominal pressure. -MET         Plan: Pt considered discharged from physical therapy. Pt instructed to call clinic if he/she has any further questions and to continue home exercise program.      Patient/Family/Caregiver was advised of these findings, precautions, and treatment options and has agreed to actively participate in planning and for this course of care. Thank you for your referral. If you have any questions, please contact me at Dept: [phone].     Sincerely,  Electronically signed by therapist: Allan Persaud, PT

## (undated) NOTE — LETTER
10/05/20        Glenn Camargo      Dear Erlinda Avila records indicate that you have outstanding lab work and or testing that was ordered for you and has not yet been completed:  Orders Placed This Encounter

## (undated) NOTE — LETTER
12/14/20        Glenn Christian      Dear [facility] records indicate that you have outstanding lab work and or testing that was ordered for you and has not yet been completed:  Orders Placed This Encounter

## (undated) NOTE — LETTER
09/04/19        Glenn Christian      Dear Elsa Kaur records indicate that you have outstanding lab work and or testing that was ordered for you and has not yet been completed:  Orders Placed This Encounter

## (undated) NOTE — LETTER
02/04/20        Glenn Howard  [address] [facility]      Dear Kaycee Tai records indicate that you have outstanding lab work and or testing that was ordered for you and has not yet been completed:  Orders Placed This Encounter

## (undated) NOTE — LETTER
[facility]     [x] NEW APPLICANT  [address] [place] [address]  [] RENEWAL            Persons with Disabilities Certification for Parking Placard  *This form is valid for three months from your physician's signature date for a Temporary Placard and six months for a Permanent Placard.    NOTE TO ALL DISABILITY LICENSE PLATE OWNERS:  If you have a disability license plate, you MUST complete the form and renew your placard.    DIRECTIONS: Both sides of this document must be signed and completed fully. All fields are required.   Applicants complete Part 1. If the applicant is a MINOR, then Parent/Guardian(s) MUST also complete Part 2. The applicant's medical professional MUSTcomplete Part 3. If the applicant is applying for meter-exempt parking, his/her medical professional MUST also complete Part 4.    PART 1:   Applicant Information (MUST have a valid [place] 's license and/or ID card)  I hereby certify  that I meet the definition of a person with a disability as provided in [address] [place] [address] 5/1-159.1, and I certify  that my physical condition entitles me to the issuance of a Persons with Disabilities Parking Placard. By affixing my signature below, I understand that the parking placard may not be used unless I am the  or passenger of the vehicle.     *If a  , please provide a copy of your  showing proof of service.     Disability Parking Placard # (if any)     Full Name of Person with Disability (If minor, complete Part 2 also)    Glenn Howard  Male/Female  female  Date of Birth   3/27/1950    Valid [place] ’s License or ID Card # of Applicant                                                                                                  [place] Address  [address] [facility] [address]    Mailing Address if Different from Above   Telephone Number  [phone] (home)  Email Address   [email or web address]  [place]?  Yes/No  No     Signature of Person with Disability Today’s Date  6/22/2024     PART 2:   For Parent or Legal Guardian (MUST have a valid 's license and/or ID card)  I hereby certify that the above applicant is a minor and I have primary responsibility for his/her transportation. By affixing my signature below, I understand that the disability placard is issued to the person with the disability and may not be used unless I am transporting the disabled person in the vehicle.     Name of Parent or Legal Guardian   Relationship to Person with Disability   Valid [place] ’s License or ID Card #          [place] Address Apt/Unit# City State Zip   Telephone Number Email Address   Signature of Parent or Legal Guardian Today’s Date  6/22/2024     Warning: Any misuse of the disability parking placard/plates or making a false application may result in the revocation of the placard, a 12-month suspension or revocation of your drivers license, and a fine of up to $1,000.    Temporary Disabled Parking Placard Applications -- May be taken to any [facility] facility or mailed in.  Permanent Disables Parking Placard Applications -- MUST be mailed to the following address:  , Persons with Disabilities Placard Unit, [address] [place] [address], [address], [place] [address].    *If you have a permanent disability placard and would like a Persons with Disabilities License Plate, please visit your local  facility to apply. You will need your permanent placard number and current plate number or [email or web address].     Please complete Page 2 to ensure timely processing.    Printed by authority of the [facility]. July 2021 -- 1 -- VSD 62.28          Part 3: Medical Eligibility Standards and Medical Professionals Certification  As the medical professional(s) executing this document and verifying the nature of the applicant's disability, I understand that making a false representation of a person's  disability for the purposes of obtaining any type of a disabled parking placard may result in suspension or revocation of my license and a fine of up to $1,000. As a licensed physician, advanced practice nurse, optometrist, chiropractor or physician's assistant, I certify the applicant has a condition that constitutes him/her as a person with disabilities.   Length of Disability: (Check one)   []   Temporary Disability; the duration of this disability is _____ ________________ (maximum 6 months)  [x]   Permanent Disability  []   Meter-Exempt Disability (Must complete and sign Part 4 also.)  Check all that apply (MUST check at least one):  []  Is restricted by a lung disease to such a degree that the person’s forced (respiratory) expiratory volume (FEV) for 1 second, when measured by spirometry, is less than 1 liter.  []   Uses a portable oxygen device.  []   Has a Class III or Class IV cardiac condition according to the standards set by the American Heart Association.  [x]   Cannot walk without the use of or assistance from a wheelchair, a walker, a crutch, a brace, a prosthetic device or another person.  [x]   Is severely limited in the ability to walk due to an arthritic, neurological, oncological or orthopedic condition.  []   Cannot walk 200 feet without stopping to rest because of one of the above five conditions.  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     Medical Professional’s Printed Name*    Yanet Mcgowan MD Specialty  IM   Office Address   [facility], [address] [place] [id number]  Dept: [phone]  Dept Fax: [phone]   Medical Professional’s Signature   State Professional License Number (NOT NPI#)  [id number] Today’s Date                  6/22/2024    Signature of Collaborating/Supervising Physician (if signed above by resident/assistant) Supervising State Professional License Number             PART 4: Medical Eligibility for Meter-Exempt Parking  The meter-exempt parking certification must be completed only when the applicant qualifies. To qualify, the applicant MUST have a VALID  [place] 's license, have an ambulatory disability described in Part 3, and also have one of the following conditions listed below.  Economic need is not a consideration for meter-exempt parking    The applicant is eligible for meter-exempt parking as provided by statue due to the following PERMANENT medical condition or disability:    Check all that apply:  [] Cannot manage, manipulate, or insert coins, or obtain tickets in parking meters/ticket machines due to lack of fine motor control of BOTH hands.  [] Cannot reach above his/her head to a height of 42 inches from the ground due to a lack of finger, hand or upper-extremity strength or mobility.  [] Cannot approach a parking meter due to his/her use of a wheelchair or other device for mobility.  [] Cannot walk more than 20 feet due to an orthopedic, neurological, cardiovascular, or lung condition in which the degree of debilitation is so severe that it almost completely impedes the ability to walk.  [] Missing a hand(s) or arm(s) or has permanently lost the use of a hand or arm.  [] Patient is under 18 years of age and incapable of driving.     Medical Professional’s Signature State Professional License Number (NOT NPI#)   Today’s Date                  6/22/2024    Signature of  Collaborating/Supervising Physician (if signed above by resident/assistant) Supervising State Professional License Number     FOR  OFFICE USE ONLY     Parking Placard Number:  Expiration Date:   Issued By: Issued Date: